# Patient Record
Sex: FEMALE | Race: WHITE | NOT HISPANIC OR LATINO | Employment: UNEMPLOYED | URBAN - METROPOLITAN AREA
[De-identification: names, ages, dates, MRNs, and addresses within clinical notes are randomized per-mention and may not be internally consistent; named-entity substitution may affect disease eponyms.]

---

## 2017-01-10 ENCOUNTER — TRANSCRIBE ORDERS (OUTPATIENT)
Dept: ADMINISTRATIVE | Age: 67
End: 2017-01-10

## 2017-01-10 ENCOUNTER — APPOINTMENT (OUTPATIENT)
Dept: LAB | Age: 67
End: 2017-01-10
Payer: MEDICARE

## 2017-01-10 ENCOUNTER — APPOINTMENT (OUTPATIENT)
Dept: PHYSICAL THERAPY | Age: 67
End: 2017-01-10
Payer: MEDICARE

## 2017-01-10 DIAGNOSIS — E78.2 MIXED HYPERLIPIDEMIA: ICD-10-CM

## 2017-01-10 DIAGNOSIS — Z12.11 ENCOUNTER FOR SCREENING FOR MALIGNANT NEOPLASM OF COLON: ICD-10-CM

## 2017-01-10 DIAGNOSIS — Z12.31 ENCOUNTER FOR SCREENING MAMMOGRAM FOR MALIGNANT NEOPLASM OF BREAST: ICD-10-CM

## 2017-01-10 DIAGNOSIS — K21.9 GASTRO-ESOPHAGEAL REFLUX DISEASE WITHOUT ESOPHAGITIS: ICD-10-CM

## 2017-01-10 LAB
ALBUMIN SERPL BCP-MCNC: 3.7 G/DL (ref 3.5–5)
ALP SERPL-CCNC: 63 U/L (ref 46–116)
ALT SERPL W P-5'-P-CCNC: 27 U/L (ref 12–78)
ANION GAP SERPL CALCULATED.3IONS-SCNC: 6 MMOL/L (ref 4–13)
AST SERPL W P-5'-P-CCNC: 12 U/L (ref 5–45)
BILIRUB SERPL-MCNC: 0.49 MG/DL (ref 0.2–1)
BUN SERPL-MCNC: 13 MG/DL (ref 5–25)
CALCIUM SERPL-MCNC: 8.9 MG/DL (ref 8.3–10.1)
CHLORIDE SERPL-SCNC: 106 MMOL/L (ref 100–108)
CHOLEST SERPL-MCNC: 225 MG/DL (ref 50–200)
CO2 SERPL-SCNC: 30 MMOL/L (ref 21–32)
CREAT SERPL-MCNC: 0.66 MG/DL (ref 0.6–1.3)
GFR SERPL CREATININE-BSD FRML MDRD: >60 ML/MIN/1.73SQ M
GLUCOSE SERPL-MCNC: 85 MG/DL (ref 65–140)
HDLC SERPL-MCNC: 83 MG/DL (ref 40–60)
LDLC SERPL CALC-MCNC: 126 MG/DL (ref 0–100)
POTASSIUM SERPL-SCNC: 3.9 MMOL/L (ref 3.5–5.3)
PROT SERPL-MCNC: 7.1 G/DL (ref 6.4–8.2)
SODIUM SERPL-SCNC: 142 MMOL/L (ref 136–145)
TRIGL SERPL-MCNC: 80 MG/DL

## 2017-01-10 PROCEDURE — G8992 OTHER PT/OT  D/C STATUS: HCPCS | Performed by: PHYSICAL THERAPIST

## 2017-01-10 PROCEDURE — 97110 THERAPEUTIC EXERCISES: CPT

## 2017-01-10 PROCEDURE — 36415 COLL VENOUS BLD VENIPUNCTURE: CPT

## 2017-01-10 PROCEDURE — G8991 OTHER PT/OT GOAL STATUS: HCPCS | Performed by: PHYSICAL THERAPIST

## 2017-01-10 PROCEDURE — 80061 LIPID PANEL: CPT

## 2017-01-10 PROCEDURE — 80053 COMPREHEN METABOLIC PANEL: CPT

## 2017-01-18 ENCOUNTER — ALLSCRIPTS OFFICE VISIT (OUTPATIENT)
Dept: OTHER | Facility: OTHER | Age: 67
End: 2017-01-18

## 2017-02-06 ENCOUNTER — GENERIC CONVERSION - ENCOUNTER (OUTPATIENT)
Dept: OTHER | Facility: OTHER | Age: 67
End: 2017-02-06

## 2017-03-31 DIAGNOSIS — Z12.31 ENCOUNTER FOR SCREENING MAMMOGRAM FOR MALIGNANT NEOPLASM OF BREAST: ICD-10-CM

## 2017-05-11 ENCOUNTER — HOSPITAL ENCOUNTER (OUTPATIENT)
Dept: RADIOLOGY | Age: 67
Discharge: HOME | End: 2017-05-11
Payer: MEDICARE

## 2017-05-11 DIAGNOSIS — Z12.31 ENCOUNTER FOR SCREENING MAMMOGRAM FOR MALIGNANT NEOPLASM OF BREAST: ICD-10-CM

## 2017-06-20 ENCOUNTER — HOSPITAL ENCOUNTER (OUTPATIENT)
Dept: RADIOLOGY | Age: 67
Discharge: HOME | End: 2017-06-20
Payer: MEDICARE

## 2017-06-20 DIAGNOSIS — Z12.31 ENCOUNTER FOR SCREENING MAMMOGRAM FOR MALIGNANT NEOPLASM OF BREAST: ICD-10-CM

## 2017-06-20 PROCEDURE — G0202 SCR MAMMO BI INCL CAD: HCPCS

## 2017-08-14 ENCOUNTER — ALLSCRIPTS OFFICE VISIT (OUTPATIENT)
Dept: OTHER | Facility: OTHER | Age: 67
End: 2017-08-14

## 2017-08-14 DIAGNOSIS — R53.81 OTHER MALAISE: ICD-10-CM

## 2017-08-14 DIAGNOSIS — E55.9 VITAMIN D DEFICIENCY: ICD-10-CM

## 2017-08-14 DIAGNOSIS — R53.83 OTHER FATIGUE: ICD-10-CM

## 2017-08-14 DIAGNOSIS — E53.8 DEFICIENCY OF OTHER SPECIFIED B GROUP VITAMINS: ICD-10-CM

## 2017-08-14 DIAGNOSIS — S99.929A INJURY OF FOOT: ICD-10-CM

## 2017-08-15 ENCOUNTER — TRANSCRIBE ORDERS (OUTPATIENT)
Dept: ADMINISTRATIVE | Age: 67
End: 2017-08-15

## 2017-08-15 ENCOUNTER — APPOINTMENT (OUTPATIENT)
Dept: LAB | Age: 67
End: 2017-08-15
Payer: MEDICARE

## 2017-08-15 DIAGNOSIS — R53.83 OTHER FATIGUE: ICD-10-CM

## 2017-08-15 DIAGNOSIS — R53.81 OTHER MALAISE: ICD-10-CM

## 2017-08-15 DIAGNOSIS — E55.9 VITAMIN D DEFICIENCY: ICD-10-CM

## 2017-08-15 DIAGNOSIS — E53.8 DEFICIENCY OF OTHER SPECIFIED B GROUP VITAMINS: ICD-10-CM

## 2017-08-15 LAB
25(OH)D3 SERPL-MCNC: 42.1 NG/ML (ref 30–100)
TSH SERPL DL<=0.05 MIU/L-ACNC: 2.64 UIU/ML (ref 0.36–3.74)
VIT B12 SERPL-MCNC: 886 PG/ML (ref 100–900)

## 2017-08-15 PROCEDURE — 82607 VITAMIN B-12: CPT

## 2017-08-15 PROCEDURE — 82306 VITAMIN D 25 HYDROXY: CPT

## 2017-08-15 PROCEDURE — 84443 ASSAY THYROID STIM HORMONE: CPT

## 2017-08-15 PROCEDURE — 36415 COLL VENOUS BLD VENIPUNCTURE: CPT

## 2017-08-18 ENCOUNTER — APPOINTMENT (OUTPATIENT)
Dept: RADIOLOGY | Age: 67
End: 2017-08-18
Attending: FAMILY MEDICINE
Payer: MEDICARE

## 2017-08-18 ENCOUNTER — OFFICE VISIT (OUTPATIENT)
Dept: URGENT CARE | Age: 67
End: 2017-08-18
Payer: MEDICARE

## 2017-08-18 DIAGNOSIS — S99.929A INJURY OF FOOT: ICD-10-CM

## 2017-08-18 PROCEDURE — 73630 X-RAY EXAM OF FOOT: CPT

## 2017-08-18 PROCEDURE — 99203 OFFICE O/P NEW LOW 30 MIN: CPT | Performed by: FAMILY MEDICINE

## 2017-08-18 PROCEDURE — G0463 HOSPITAL OUTPT CLINIC VISIT: HCPCS | Performed by: FAMILY MEDICINE

## 2017-09-28 ENCOUNTER — ALLSCRIPTS OFFICE VISIT (OUTPATIENT)
Dept: OTHER | Facility: OTHER | Age: 67
End: 2017-09-28

## 2017-10-17 RX ORDER — LEVALBUTEROL TARTRATE 45 UG/1
1 AEROSOL, METERED ORAL EVERY 4 HOURS PRN
COMMUNITY
End: 2018-08-22 | Stop reason: SDUPTHER

## 2017-10-17 RX ORDER — MONTELUKAST SODIUM 10 MG/1
10 TABLET ORAL DAILY
COMMUNITY
End: 2018-04-15 | Stop reason: SDUPTHER

## 2017-10-17 RX ORDER — FOLIC ACID 1 MG/1
1 TABLET ORAL
Status: ON HOLD | COMMUNITY
End: 2017-10-24 | Stop reason: ALTCHOICE

## 2017-10-17 RX ORDER — FLUTICASONE PROPIONATE 50 MCG
2 SPRAY, SUSPENSION (ML) NASAL AS NEEDED
COMMUNITY
End: 2019-09-05 | Stop reason: SDUPTHER

## 2017-10-24 ENCOUNTER — GENERIC CONVERSION - ENCOUNTER (OUTPATIENT)
Dept: GASTROENTEROLOGY | Facility: MEDICAL CENTER | Age: 67
End: 2017-10-24

## 2017-10-24 ENCOUNTER — HOSPITAL ENCOUNTER (OUTPATIENT)
Facility: MEDICAL CENTER | Age: 67
Setting detail: OUTPATIENT SURGERY
Discharge: HOME | End: 2017-10-24
Attending: INTERNAL MEDICINE | Admitting: INTERNAL MEDICINE
Payer: MEDICARE

## 2017-10-24 ENCOUNTER — ANESTHESIA EVENT (OUTPATIENT)
Dept: GASTROENTEROLOGY | Facility: MEDICAL CENTER | Age: 67
End: 2017-10-24
Payer: MEDICARE

## 2017-10-24 ENCOUNTER — ANESTHESIA (OUTPATIENT)
Dept: GASTROENTEROLOGY | Facility: MEDICAL CENTER | Age: 67
End: 2017-10-24
Payer: MEDICARE

## 2017-10-24 VITALS
OXYGEN SATURATION: 97 % | HEART RATE: 65 BPM | WEIGHT: 154 LBS | RESPIRATION RATE: 18 BRPM | TEMPERATURE: 97.7 F | DIASTOLIC BLOOD PRESSURE: 73 MMHG | HEIGHT: 64 IN | BODY MASS INDEX: 26.29 KG/M2 | SYSTOLIC BLOOD PRESSURE: 113 MMHG

## 2017-10-24 DIAGNOSIS — R13.14 DYSPHAGIA, PHARYNGOESOPHAGEAL PHASE: ICD-10-CM

## 2017-10-24 PROCEDURE — 88313 SPECIAL STAINS GROUP 2: CPT | Performed by: INTERNAL MEDICINE

## 2017-10-24 PROCEDURE — 88342 IMHCHEM/IMCYTCHM 1ST ANTB: CPT | Performed by: INTERNAL MEDICINE

## 2017-10-24 PROCEDURE — 88305 TISSUE EXAM BY PATHOLOGIST: CPT | Performed by: INTERNAL MEDICINE

## 2017-10-24 RX ORDER — SODIUM CHLORIDE 9 MG/ML
125 INJECTION, SOLUTION INTRAVENOUS CONTINUOUS
Status: DISCONTINUED | OUTPATIENT
Start: 2017-10-24 | End: 2017-10-24 | Stop reason: HOSPADM

## 2017-10-24 RX ORDER — ANTIARTHRITIC COMBINATION NO.2 900 MG
TABLET ORAL
COMMUNITY
End: 2021-02-15

## 2017-10-24 RX ORDER — LANOLIN ALCOHOL/MO/W.PET/CERES
CREAM (GRAM) TOPICAL DAILY
COMMUNITY

## 2017-10-24 RX ORDER — NIACIN 500 MG
500 TABLET ORAL
COMMUNITY
End: 2021-02-15

## 2017-10-24 RX ORDER — PROPOFOL 10 MG/ML
INJECTION, EMULSION INTRAVENOUS AS NEEDED
Status: DISCONTINUED | OUTPATIENT
Start: 2017-10-24 | End: 2017-10-24 | Stop reason: SURG

## 2017-10-24 RX ADMIN — PROPOFOL 20 MG: 10 INJECTION, EMULSION INTRAVENOUS at 10:39

## 2017-10-24 RX ADMIN — PROPOFOL 20 MG: 10 INJECTION, EMULSION INTRAVENOUS at 10:35

## 2017-10-24 RX ADMIN — PROPOFOL 80 MG: 10 INJECTION, EMULSION INTRAVENOUS at 10:34

## 2017-10-24 RX ADMIN — SODIUM CHLORIDE 125 ML/HR: 0.9 INJECTION, SOLUTION INTRAVENOUS at 09:55

## 2017-10-24 NOTE — ANESTHESIA PREPROCEDURE EVALUATION
Review of Systems/Medical History  Patient summary reviewed  Chart reviewed  No history of anesthetic complications     Cardiovascular  Negative cardio ROS    Pulmonary  Asthma: , ,        GI/Hepatic    GERD well controlled,   Comment: Dysphagia      Negative  ROS        Endo/Other  Negative endo/other ROS      GYN  Negative gynecology ROS          Hematology  Negative hematology ROS      Musculoskeletal  Negative musculoskeletal ROS        Neurology   Psychology   Negative psychology ROS            Physical Exam    Airway    Mallampati score: II  TM Distance: >3 FB  Neck ROM: full     Dental   No notable dental hx     Cardiovascular  Comment: Negative ROS, Rhythm: regular, Rate: normal, Cardiovascular exam normal    Pulmonary  Pulmonary exam normal Breath sounds clear to auscultation,     Other Findings        Anesthesia Plan  ASA Score- 2       Anesthesia Type- IV sedation with anesthesia with ASA Monitors  Additional Monitors:   Airway Plan:           Induction- intravenous  Informed Consent- Anesthetic plan and risks discussed with patient

## 2017-10-24 NOTE — OP NOTE
**** GI/ENDOSCOPY REPORT ****     PATIENT NAME: Latasha Verdin - VISIT ID:  Patient ID: [id number]   YOB: 1950     INTRODUCTION: Esophagogastroduodenoscopy - A 69 female patient presents   for an outpatient Esophagogastroduodenoscopy at [address] [place] [address]  INDICATIONS: Dysphagia  CONSENT: The benefits, risks, and alternatives to the procedure were   discussed and informed consent was obtained from the patient  PREPARATION:  EKG, pulse, pulse oximetry and blood pressure were monitored   throughout the procedure  MEDICATIONS: MAC anesthesia  PROCEDURE:  The endoscope was passed without difficulty through the mouth   under direct visualization and advanced to the 2nd portion of the   duodenum  The scope was withdrawn and the mucosa was carefully examined  FINDINGS:   Esophagus: The Z line was visualized at 38 cm from the entry   site  The esophagus appeared to be normal  No signs of skinner's   esophagus  A cold forceps biopsy was taken  Stomach: There was a few   areas of erosion in the antrum  A cold forceps biopsy was taken  Duodenum: The duodenum appeared to be normal      COMPLICATIONS: There were no complications  IMPRESSIONS: Z line visualized  Normal esophagus  Biopsy taken  Areas of   erosion found in the antrum  Biopsy taken  Normal duodenum  RECOMMENDATIONS: Follow-up on the results of the biopsy specimens  ESTIMATED BLOOD LOSS: Insignificant  PATHOLOGY SPECIMENS: Cold forceps random biopsy taken  Cold forceps biopsy   taken  Associated finding: Erosion  PROCEDURE CODES:     ICD-9 Codes: 787 2 DYSPHAGIA 535 4 Other specified gastritides     ICD-10 Codes: R13 10 Dysphagia, unspecified R19 8 Other specified symptoms   and signs involving the digestive system and abdomen     PERFORMED BY: JAYLENE Pan  on 10/24/2017  Version 1, electronically signed by JAYLENE Gee  on 10/24/2017 at   10:45

## 2017-10-24 NOTE — DISCHARGE INSTRUCTIONS
Upper Endoscopy   WHAT YOU NEED TO KNOW:   An upper endoscopy is also called an upper gastrointestinal (GI) endoscopy, or an esophagogastroduodenoscopy (EGD)  You may feel bloated, gassy, or have some abdominal discomfort after your procedure  Your throat may be sore for 24 to 36 hours  You may burp or pass gas from air that is still inside your body  DISCHARGE INSTRUCTIONS:   Call 911 for any of the following:   · You have sudden chest pain or trouble breathing  Seek care immediately if:   · You feel dizzy or faint  · You have trouble swallowing  · Your bowel movements are very dark or black  · Your abdomen is hard and firm and you have severe pain  · You vomit blood  Contact your healthcare provider if:   · You feel full or bloated and cannot burp or pass gas  · You have not had a bowel movement for 3 days after your procedure  · You have neck pain  · You have a fever or chills  · You have nausea or are vomiting  · You have a rash or hives  · You have questions or concerns about your endoscopy  Relieve a sore throat:  Suck on throat lozenges or crushed ice  Gargle with a small amount of warm salt water  Mix 1 teaspoon of salt and 1 cup of warm water to make salt water  Relieve gas and discomfort from bloating:  Lie on your right side with a heating pad on your abdomen  Take short walks to help pass gas  Eat small meals until bloating is relieved  Rest after your procedure: You have been given medicine to relax you  Do not  drive or make important decisions until the day after your procedure  Return to your normal activity as directed  You can usually return to work the day after your procedure  Follow up with your healthcare provider as directed:  Write down your questions so you remember to ask them during your visits     © 2017 [address] is for End User's use only and may not be sold, redistributed or otherwise used for commercial purposes  All illustrations and images included in CareNotes® are the copyrighted property of [employer]  or Massimo Krause  The above information is an  only  It is not intended as medical advice for individual conditions or treatments  Talk to your doctor, nurse or pharmacist before following any medical regimen to see if it is safe and effective for you  Gastritis   WHAT YOU NEED TO KNOW:   Gastritis is inflammation or irritation of the lining of your stomach  DISCHARGE INSTRUCTIONS:   Call 911 for any of the following:   · You develop chest pain or shortness of breath  Seek care immediately if:   · You vomit blood  · You have black or bloody bowel movements  · You have severe stomach or back pain  Contact your healthcare provider if:   · You have a fever  · You have new or worsening symptoms, even after treatment  · You have questions or concerns about your condition or care  Medicines:   · Medicines  may be given to help treat a bacterial infection or decrease stomach acid  · Take your medicine as directed  Contact your healthcare provider if you think your medicine is not helping or if you have side effects  Tell him or her if you are allergic to any medicine  Keep a list of the medicines, vitamins, and herbs you take  Include the amounts, and when and why you take them  Bring the list or the pill bottles to follow-up visits  Carry your medicine list with you in case of an emergency  Manage or prevent gastritis:   · Do not smoke  Nicotine and other chemicals in cigarettes and cigars can make your symptoms worse and cause lung damage  Ask your healthcare provider for information if you currently smoke and need help to quit  E-cigarettes or smokeless tobacco still contain nicotine  Talk to your healthcare provider before you use these products  · Do not drink alcohol  Alcohol can prevent healing and make your gastritis worse   Talk to your healthcare provider if you need help to stop drinking  · Do not take NSAIDs or aspirin unless directed  These and similar medicines can cause irritation  If your healthcare provider says it is okay to take NSAIDs, take them with food  · Do not eat foods that cause irritation  Foods such as oranges and salsa can cause burning or pain  Eat a variety of healthy foods  Examples include fruits (not citrus), vegetables, low-fat dairy products, beans, whole-grain breads, and lean meats and fish  Try to eat small meals, and drink water with your meals  Do not eat for at least 3 hours before you go to bed  · Find ways to relax and decrease stress  Stress can increase stomach acid and make gastritis worse  Activities such as yoga, meditation, or listening to music can help you relax  Spend time with friends, or do things you enjoy  Follow up with your healthcare provider as directed: You may need ongoing tests or treatment, or referral to a gastroenterologist  Write down your questions so you remember to ask them during your visits  © 2017 [address] [facility] Information is for End User's use only and may not be sold, redistributed or otherwise used for commercial purposes  All illustrations and images included in CareNotes® are the copyrighted property of [employer]  or Massimo Krause  The above information is an  only  It is not intended as medical advice for individual conditions or treatments  Talk to your doctor, nurse or pharmacist before following any medical regimen to see if it is safe and effective for you

## 2017-10-25 ENCOUNTER — TRANSCRIBE ORDERS (OUTPATIENT)
Dept: ADMINISTRATIVE | Facility: HOSPITAL | Age: 67
End: 2017-10-25

## 2017-10-25 DIAGNOSIS — R13.19 ESOPHAGEAL DYSPHAGIA: Primary | ICD-10-CM

## 2017-11-01 ENCOUNTER — GENERIC CONVERSION - ENCOUNTER (OUTPATIENT)
Dept: OTHER | Facility: OTHER | Age: 67
End: 2017-11-01

## 2017-12-19 ENCOUNTER — HOSPITAL ENCOUNTER (OUTPATIENT)
Dept: GASTROENTEROLOGY | Facility: HOSPITAL | Age: 67
Discharge: HOME | End: 2017-12-22
Attending: INTERNAL MEDICINE
Payer: MEDICARE

## 2017-12-19 VITALS
DIASTOLIC BLOOD PRESSURE: 77 MMHG | RESPIRATION RATE: 20 BRPM | HEART RATE: 76 BPM | SYSTOLIC BLOOD PRESSURE: 149 MMHG | WEIGHT: 156 LBS | TEMPERATURE: 98.1 F | HEIGHT: 64 IN | BODY MASS INDEX: 26.63 KG/M2 | OXYGEN SATURATION: 97 %

## 2017-12-19 PROCEDURE — 91020 GASTRIC MOTILITY STUDIES: CPT

## 2017-12-19 NOTE — PERIOPERATIVE NURSING NOTE
Manometry catheter passed through right side  Pt tolerated 20 liquid swallows  Catheter removed withour difficulty  Pt left in NAD

## 2018-01-11 NOTE — PROGRESS NOTES
Assessment    1  Benign essential hypertension (401 1) (I10)   · wean off BP med   2  Allergic rhinitis (477 9) (J30 9)   3  Gastroesophageal reflux disease without esophagitis (530 81) (K21 9)    Plan  Allergic rhinitis, Asthma    · Montelukast Sodium 10 MG Oral Tablet; TAKE 1 TABLET BY MOUTH DAILY  Allergic rhinitis, Benign essential hypertension, Gastroesophageal reflux disease  without esophagitis    · Continue with our present treatment plan ; Status:Complete;   Done: 77INM2461  Atypical chest pain, Gastroesophageal reflux disease without esophagitis    · Ranitidine HCl - 150 MG Oral Tablet; TAKE 1 TABLET TWICE DAILY    Chief Complaint  Pt is here for a recheck of her BP  No problems or complaints  She retired from her job 4 days ago  History of Present Illness  Just retired! The patient presents for follow-up of essential hypertension  The patient states she has been doing well with her blood pressure control since the last visit  Symptoms:   Home monitoring: The patient checks her blood pressure sporadically  Medications: The patient is not currently on any medications for her hypertension--lifestyle modification only  The patient is being seen for a routine clinic follow-up of rhinitis  Symptoms include:  nasal itching    The patient presents with complaints of nasal drainage  Symptoms are improving  Current treatment includes leukotriene modifiers  The patient is being seen for a routine clinic follow-up of gastroesophageal reflux disease  The patient presents with complaints of gradual onset of heartburn  She is currently experiencing heartburn  The patient is currently experiencing symptoms  Review of Systems    Constitutional: No fever, no chills, feels well, no tiredness, no recent weight gain or weight loss  Eyes: No complaints of eye pain, no red eyes, no eyesight problems, no discharge, no dry eyes, no itching of eyes     ENT: no complaints of earache, no loss of hearing, no nose bleeds, no nasal discharge, no sore throat, no hoarseness  Cardiovascular: No complaints of slow heart rate, no fast heart rate, no chest pain, no palpitations, no leg claudication, no lower extremity edema  Respiratory: No complaints of shortness of breath, no wheezing, no cough, no SOB on exertion, no orthopnea, no PND  Gastrointestinal: No complaints of abdominal pain, no constipation, no nausea or vomiting, no diarrhea, no bloody stools  Genitourinary: No complaints of dysuria, no incontinence, no pelvic pain, no dysmenorrhea, no vaginal discharge or bleeding  Musculoskeletal: No complaints of arthralgias, no myalgias, no joint swelling or stiffness, no limb pain or swelling  Integumentary: No complaints of skin rash or lesions, no itching, no skin wounds, no breast pain or lump  Neurological: No complaints of headache, no confusion, no convulsions, no numbness, no dizziness or fainting, no tingling, no limb weakness, no difficulty walking  Psychiatric: Not suicidal, no sleep disturbance, no anxiety or depression, no change in personality, no emotional problems  Endocrine: No complaints of proptosis, no hot flashes, no muscle weakness, no deepening of the voice, no feelings of weakness  Hematologic/Lymphatic: No complaints of swollen glands, no swollen glands in the neck, does not bleed easily, does not bruise easily  ROS reviewed  Active Problems    1  Abnormal mammogram (793 80) (R92 8)   2  Allergic rhinitis (477 9) (J30 9)   3  Arthritis (716 90) (M19 90)   4  Asthma (493 90) (J45 909)   5  Atypical chest pain (786 59) (R07 89)   6  Benign essential hypertension (401 1) (I10)   7  Encounter for screening colonoscopy (V76 51) (Z12 11)   8  Gastroesophageal reflux disease without esophagitis (530 81) (K21 9)   9  Low back pain (724 2) (M54 5)   10  Menopause (627 2)   11  Mixed hyperlipidemia (272 2) (E78 2)   12  Osteoarthritis (715 90) (M19 90)   13   Osteopenia (733 90) (M85 80)   14  Perimenopausal vasomotor symptoms (627 2) (N95 1)   15  Vitamin D deficiency (268 9) (E55 9)    Past Medical History    1  History of Abnormal alkaline phosphatase test (790 5) (R74 8)   2  History of Abnormal breast finding (796 4) (R68 89)   3  History of Abnormal electrocardiogram (794 31) (R94 31)   4  History of DEXA Body Composition Study   5  History of Encounter for routine gynecological examination (V72 31) (Z01 419)   6  History of Encounter for screening mammogram for malignant neoplasm of breast   (V76 12) (Z12 31)   7  History of allergic reaction (V15 09) (Z88 9)   8  History of shortness of breath (V13 89) (Z87 898)   9  History of Palpitations (785 1) (R00 2)   10  History of Pelvic pressure in female (625 8) (N94 89)   11  History of Screening for thyroid disorder (V77 0) (Z13 29)   12  History of Screening, lipid (V77 91) (Z13 220)    The active problems and past medical history were reviewed and updated today  Surgical History    1  History of Cataract Surgery   2  History of Colonoscopy (Fiberoptic) Screening   3  History of Endometrial Biopsy By Suction   4  History of Hysterectomy   5  History of Shoulder Surgery   6  History of Tonsillectomy   7  History of Total Abdominal Hysterectomy    The surgical history was reviewed and updated today  Family History    1  Family history of diabetes mellitus (V18 0) (Z83 3)   2  Family history of hypertension (V17 49) (Z82 49)   3  Family history of skin cancer (V16 8) (Z80 8)    4  Family history of cardiac disorder (V17 49) (Z82 49)   5  Family history of cerebrovascular accident (V17 1) (Z82 3)   6  Family history of diabetes mellitus (V18 0) (Z83 3)   7  Family history of hypertension (V17 49) (Z82 49)   8  Family history of malignant neoplasm (V16 9) (Z80 9)    The family history was reviewed and updated today         Social History    · Being A Social Drinker   · Engaged to be    · Never A Smoker   · Never Drank Alcohol   · Never Used Drugs  The social history was reviewed and updated today  Current Meds   1  Fluticasone Propionate 50 MCG/ACT Nasal Suspension; USE 1 TO 2 SPRAYS IN EACH   NOSTRIL ONCE DAILY; Therapy: 74MEN9438 to (Last [facility])  Requested for: 54Yhd7127 Ordered   2  Folic Acid 1 MG Oral Tablet; Therapy: 77CUZ3291 to Recorded   3  Montelukast Sodium 10 MG Oral Tablet; TAKE 1 TABLET BY MOUTH DAILY; Therapy: 87NSO5910 to (Tyrel Tenorio)  Requested for: 78UQI4549; Last   Rx:34Ygj8427 Ordered   4  Niaspan TBCR; Therapy: (Recorded:68Ryz9845) to Recorded   5  Premarin 0 45 MG Oral Tablet; take one tablet by mouth every day; Therapy: 33ZKQ1841 to (Last Rx:53Yef6941)  Requested for: 79Ggv3636 Ordered   6  Ranitidine HCl - 150 MG Oral Tablet; TAKE 1 TABLET TWICE DAILY; Therapy: 06ZZE5260 to (Evaluate:67Tsw0956)  Requested for: 42VBK8483; Last   Rx:73Uaz2547 Ordered   7  Super B Complex Maxi Oral Tablet; Therapy: 51OLS1561 to Recorded   8  Vitamin C Oral Tablet Chewable; Therapy: (Recorded:66Iow7678) to Recorded   9  Vitamin D-3 5000 UNIT TABS; Therapy: 21KGE0817 to Recorded   10  Xopenex HFA 45 MCG/ACT Inhalation Aerosol; INHALE 1 PUFF EVERY 4 HOURS AS    NEEDED; Therapy: 05TIN5230 to (Evaluate:64Onw7837)  Requested for: 28QFF8675; Last    Rx:44Not4418 Ordered    Allergies    1  Elavil TABS    Vitals  Vital Signs [Data Includes: Current Encounter]    Recorded: [id number] 08:42AM Recorded: 03MDL8950 89:93LQ   Systolic 026    Diastolic 80    BP Comments not audiblle on left    Height  5 ft 3 in   Weight  162 lb    BMI Calculated  28 7   BSA Calculated  1 77     Physical Exam    Constitutional   General appearance: No acute distress, well appearing and well nourished  Ears, Nose, Mouth, and Throat   Oropharynx: Normal with no erythema, edema, exudate or lesions  Pulmonary   Auscultation of lungs: Clear to auscultation      Cardiovascular   Auscultation of heart: Normal rate and rhythm, normal S1 and S2, without murmurs  Musculoskeletal   Gait and station: Normal     Psychiatric   Orientation to person, place, and time: Normal     Mood and affect: Normal          Health Management  Health Maintenance   BREAST EXAM; every 1 year; Last 49LHZ0827; Next Due: 35LGA0043; Overdue  COLONOSCOPY; every 10 years; Last 15ZHZ2702; Next Due: 47EJH4084; Active  Dexa Scan Peripheral (extremity); every 2 years; Next Due: 05Emw0287; Overdue  Medicare Annual Wellness Visit; every 1 year; Next Due: 62Mxg4013; Overdue  PELVIC EXAM; every 1 year; Next Due: 53Tbd2505; Overdue    Future Appointments    Date/Time Provider Specialty Site   03/09/2016 08:00 AM JAYLENE Paredes   [facility]     Signatures   Electronically signed by : Kathie Vasques DO; Feb 2 2747  9:06AM EST                       (Author)

## 2018-01-14 VITALS
DIASTOLIC BLOOD PRESSURE: 76 MMHG | HEART RATE: 79 BPM | TEMPERATURE: 98.1 F | BODY MASS INDEX: 28.04 KG/M2 | SYSTOLIC BLOOD PRESSURE: 118 MMHG | HEIGHT: 63 IN | OXYGEN SATURATION: 97 % | WEIGHT: 158.25 LBS

## 2018-01-14 VITALS
HEIGHT: 63 IN | WEIGHT: 156.38 LBS | DIASTOLIC BLOOD PRESSURE: 78 MMHG | BODY MASS INDEX: 27.71 KG/M2 | SYSTOLIC BLOOD PRESSURE: 134 MMHG

## 2018-01-15 VITALS
HEIGHT: 63 IN | SYSTOLIC BLOOD PRESSURE: 128 MMHG | DIASTOLIC BLOOD PRESSURE: 78 MMHG | BODY MASS INDEX: 28.02 KG/M2 | WEIGHT: 158.13 LBS

## 2018-02-13 NOTE — RESULT NOTES
Discussion/Summary   stomach biopsy showed gastritis  Verified Results  (1) TISSUE EXAM [id number] 10:36AM Mirela Garcia     Test Name Result Flag Reference   LAB AP CASE REPORT (Report)     Surgical Pathology Report             Case: [id number]                   Authorizing Provider: Harpreet Gifford MD       Collected:      10/24/2017 1036        Ordering Location:   [address] [place] [address] End    Received:      10/25/2017 [facility]                            Pathologist:      Fritz Garcia MD                                 Specimens:  A) - Stomach, bx of gastric erosions                                  B) - Esophagus, nl appearing mucosa  r/o eoe   LAB AP FINAL DIAGNOSIS (Report)     A  Stomach (biopsy):  - Chronic inactive oxyntic gastritis  - No H pylori identified on immunohistochemistry stain  - No intestinal metaplasia (Alcian blue/PAS)    B  Esophagus (biopsy):  - Extremely scant benign superficial squamous mucosa  - No intestinal metaplasia       Electronically signed by Fritz Garcia MD on 10/28/2017 at 1:05 PM   LAB AP SURGICAL ADDITIONAL INFORMATION (Report)     All controls performed with the immunohistochemical stains reported above   reacted appropriately  These tests were developed and their performance   characteristics determined by [facility] or   [facility]  They may not be cleared or approved by the [place]  Food and Drug Administration  The FDA has determined that such clearance   or approval is not necessary  These tests are used for clinical purposes  They should not be regarded as investigational or for research  This   laboratory has been approved by CLIA 88, designated as a high-complexity   laboratory and is qualified to perform these tests  LAB AP GROSS DESCRIPTION (Report)     A  The specimen is received in formalin, labeled with the patient's name   and medical record number, and is designated Stomach biopsy   The   specimen consists of 2 tan-pink soft tissue fragments each measuring 0 3   cm in greatest dimension  Entirely submitted  One cassette  B  The specimen is received in formalin, labeled with the patient's name   and medical record number, and is designated Esophagus biopsy  The   specimen consists of 2 white tan-pink soft tissue fragments each measuring   0 3 cm in greatest dimension  Entirely submitted  One cassette  Note: The estimated total formalin fixation time based upon information   provided by the submitting clinician and the standard processing schedule   is under 72 hours  MAS   LAB AP CLINICAL INFORMATION (Report)     EGD  FINDINGS:  Esophagus: The Z line was visualized at 38 cm from the entry   site  The esophagus appeared to be normal  No signs of skinner's   esophagus  A cold forceps biopsy was taken  Stomach: There was a few   areas of erosion in the antrum  A cold forceps biopsy was taken     Duodenum: The duodenum appeared to be normal

## 2018-02-16 RX ORDER — FOLIC ACID 1 MG/1
TABLET ORAL
COMMUNITY
Start: 2014-09-22 | End: 2021-02-15

## 2018-02-21 ENCOUNTER — OFFICE VISIT (OUTPATIENT)
Dept: FAMILY MEDICINE CLINIC | Facility: CLINIC | Age: 68
End: 2018-02-21
Payer: MEDICARE

## 2018-02-21 VITALS
SYSTOLIC BLOOD PRESSURE: 112 MMHG | HEIGHT: 63 IN | BODY MASS INDEX: 28.67 KG/M2 | WEIGHT: 161.8 LBS | DIASTOLIC BLOOD PRESSURE: 64 MMHG

## 2018-02-21 DIAGNOSIS — E78.2 MIXED HYPERLIPIDEMIA: Primary | ICD-10-CM

## 2018-02-21 DIAGNOSIS — Z23 NEED FOR PNEUMOCOCCAL VACCINATION: ICD-10-CM

## 2018-02-21 PROBLEM — E53.8 VITAMIN B12 DEFICIENCY: Status: ACTIVE | Noted: 2017-08-14

## 2018-02-21 PROCEDURE — 90670 PCV13 VACCINE IM: CPT

## 2018-02-21 PROCEDURE — 99214 OFFICE O/P EST MOD 30 MIN: CPT | Performed by: FAMILY MEDICINE

## 2018-02-21 PROCEDURE — G0009 ADMIN PNEUMOCOCCAL VACCINE: HCPCS

## 2018-02-21 NOTE — PROGRESS NOTES
Assessment/Plan:  Patient Instructions   Hyperlipidemia   AMBULATORY CARE:   Hyperlipidemia  is a high level of lipids (fats) in your blood  These lipids include cholesterol or triglycerides  Lipids are made by your body  They also come from the foods you eat  Your body needs lipids to work properly, but high levels increase your risk for heart disease, heart attack, and stroke  Call 911 for any of the following:   · You have any of the following signs of a heart attack:      ¨ Squeezing, pressure, or pain in your chest that lasts longer than 5 minutes or returns    ¨ Discomfort or pain in your back, neck, jaw, stomach, or arm     ¨ Trouble breathing    ¨ Nausea or vomiting    ¨ Lightheadedness or a sudden cold sweat, especially with chest pain or trouble breathing    · You have any of the following signs of a stroke:      ¨ Numbness or drooping on one side of your face     ¨ Weakness in an arm or leg    ¨ Confusion or difficulty speaking    ¨ Dizziness, a severe headache, or vision loss  Contact your healthcare provider if:   · You have questions or concerns about your condition or care  Treatment of hyperlipidemia  may first include lifestyle changes to help decrease your lipid levels  You may also need to take medicine to lower your lipid levels  Some of the lifestyle changes you may need to make include the following:  · Maintain a healthy weight  Ask your healthcare provider how much you should weigh  Ask him or her to help you create a weight loss plan if you are overweight  Weight loss can decrease your cholesterol and triglyceride levels  · Exercise as directed  Exercise lowers your cholesterol levels and helps you maintain a healthy weight  Get 40 minutes or more of moderate exercise 3 to 4 days each week  You can split your exercise into four 10-minute workouts instead of 40 minutes at one time  Examples of moderate exercises include walking briskly, swimming, or riding a bike   Work with your healthcare provider to plan the best exercise program for you  · Do not smoke  Nicotine and other chemicals in cigarettes and cigars can increase your risk for a heart attack and stroke  Ask your healthcare provider for information if you currently smoke and need help to quit  E-cigarettes or smokeless tobacco still contain nicotine  Talk to your healthcare provider before you use these products  · Eat heart-healthy foods  Talk to your dietitian about a heart-healthy diet  The following will help you manage hyperlipidemia:     ¨ Decrease the total amount of fat you eat  Choose lean meats, fat-free or 1% fat milk, and low-fat dairy products, such as yogurt and cheese  Limit or do not eat red meat  Red meats are high in fat and cholesterol  ¨ Replace unhealthy fats with healthy fats  Unhealthy fats include saturated fat, trans fat, and cholesterol  Choose soft margarines that are low in saturated fat and have little or no trans fat  Monounsaturated fats are healthy fats  These are found in olive oil, canola oil, avocado, and nuts  Polyunsaturated fats are also healthy  These are found in fish, flaxseed, walnuts, and soybeans  ¨ Eat fruits and vegetables every day  They are low in calories and fat and a good source of essential vitamins  Include dark green, red, and orange vegetables  Examples include spinach, kale, broccoli, and carrots  ¨ Eat foods high in fiber  Choose whole grain, high-fiber foods  Good choices include whole-wheat breads or cereals, beans, peas, fruits, and vegetables  · Ask your healthcare provider if it is safe for you to drink alcohol  Alcohol can increase your cholesterol and triglyceride levels  A drink of alcohol is 12 ounces of beer, 5 ounces of wine, or 1½ ounces of liquor  Follow up with your healthcare provider as directed: You may need to return for more tests  Your healthcare provider may refer you to a dietitian   Write down your questions so you remember to ask them during your visits  © 2017 [employer] Information is for End User's use only and may not be sold, redistributed or otherwise used for commercial purposes  All illustrations and images included in CareNotes® are the copyrighted property of [employer]  or Massimo Krause  The above information is an  only  It is not intended as medical advice for individual conditions or treatments  Talk to your doctor, nurse or pharmacist before following any medical regimen to see if it is safe and effective for you  Discussed at length enhancing TLC and exercise  Will check on lipid profile on decide course of action pending numbers  The chest pain she is describing is definitely atypical   I do not feel that it is cardiac in nature  Did discuss that a stress echocardiogram certainly could be done to assess heart health  She declines at this point and feels that it is not cardiac in nature  Prevnar given today      Mixed hyperlipidemia  Update labs due       Diagnoses and all orders for this visit:    Mixed hyperlipidemia  -     Lipid Panel with Direct LDL reflex; Future  -     Comprehensive metabolic panel; Future    Need for pneumococcal vaccination  -     PNEUMOCOCCAL CONJUGATE VACCINE 13-VALENT GREATER THAN 6 MONTHS    Other orders  -     folic acid (FOLVITE) 1 mg tablet; Take by mouth  -     B Complex-Folic Acid (SUPER B COMPLEX MAXI) TABS; Take by mouth          Subjective:   Chief Complaint   Patient presents with    Follow-up    Hyperlipidemia    Pain     in the center of the chest and R shoulder  Feels it is from physical labor of shoveling the snow  Patient ID: Jay Moss is a 69 y o  female  Subjective:    Jessica Ricci is here for follow up of dyslipidemia  Compliance with treatment has been fair  The patient exercises intermittently  Patient denies muscle pain associated with her medications      The following portions of the patient's history were reviewed and updated as appropriate: allergies, current medications, past family history, past medical history, past social history, past surgical history and problem list     Review of Systems  Constitutional: negative  Ears, nose, mouth, throat, and face: negative  Respiratory negative  Cardiovascular: positive for Atypical non exertional chest discomfort  Genitourinary:negative  Musculoskeletal:negative     Objective:    /64   Ht 5' 2 5" (1 588 m)   Wt 73 4 kg (161 lb 12 8 oz)   BMI 29 12 kg/m²     General Appearance:    Alert, cooperative, no distress, appears stated age  Head:    Normocephalic, without obvious abnormality, atraumatic  Eyes:    PERRL, conjunctiva/corneas clear, EOM's intact, fundi     benign, both eyes  Ears:    Normal TM's and external ear canals, both ears  Nose:   Nares normal, septum midline, mucosa normal, no drainage    or sinus tenderness  Throat:   Lips, mucosa, and tongue normal; teeth and gums normal  Neck:   Supple, symmetrical, trachea midline, no adenopathy;     thyroid:  no enlargement/tenderness/nodules; no carotid    bruit or JVD  Back:     Symmetric, no curvature, ROM normal, no CVA tenderness  Lungs:     Clear to auscultation bilaterally, respirations unlabored  Chest Wall:    No tenderness or deformity   Heart:    Regular rate and rhythm, S1 and S2 normal, no murmur, rub   or gallop    Abdomen:     Soft, non-tender, bowel sounds active all four quadrants,     no masses, no organomegaly    Extremities:   Extremities normal, atraumatic, no cyanosis or edema  Pulses:   2+ and symmetric all extremities  Skin:   Skin color, texture, turgor normal, no rashes or lesions  Lymph nodes:   Cervical, supraclavicular, and axillary nodes normal  Neurologic:   CNII-XII intact, normal strength, sensation and reflexes     throughout    Lab Review  Lab Results       Component                Value               Date                       CHOL 225 (H)             01/10/2017                 CHOL                     222 (H)             01/19/2016                 CHOL                     221                 08/11/2015                 CHOL                     221                 01/29/2015                 CHOL                     218                 08/12/2014                 CHOL                     216                 08/12/2014                 TRIG                     80                  01/10/2017                 TRIG                     101                 01/19/2016                 TRIG                     151                 08/11/2015                 TRIG                     79                  01/29/2015                 TRIG                     90                  08/12/2014                 TRIG                     90                  08/12/2014                 HDL                      83 (H)              01/10/2017                 HDL                      70 (H)              01/19/2016                 HDL                      76                  08/11/2015                 HDL                      88                  01/29/2015                 HDL                      80                  08/12/2014                 HDL                      78                  08/12/2014              Patient is due for labs  Assessment:    Dyslipidemia under fair control  Plan:    1  Continue dietary measures  2  Continue regular exercise  3  Lipid-lowering medications: None at present  Will consider if LDL >130 on recheck           Chest Pain    This is a recurrent problem  The problem occurs intermittently  The problem has been waxing and waning  The pain is present in the lateral region  The pain is at a severity of 5/10  The pain is mild  The quality of the pain is described as dull  The pain does not radiate   Pertinent negatives include no abdominal pain, back pain, claudication, cough, diaphoresis, dizziness, exertional chest pressure, shortness of breath or syncope  Associated symptoms comments:   Absolutely does not happen with exercise like going upstairs walking    The pain is aggravated by nothing (  She thinks it may be her shoulder she has a history of rotator cuff )  Review of Systems   Constitutional: Negative for diaphoresis  Respiratory: Negative for cough and shortness of breath  Cardiovascular: Positive for chest pain  Negative for claudication and syncope  Gastrointestinal: Negative for abdominal pain  Musculoskeletal: Negative for back pain  Neurological: Negative for dizziness  Objective:  /64   Ht 5' 2 5" (1 588 m)   Wt 73 4 kg (161 lb 12 8 oz)   BMI 29 12 kg/m²        Physical Exam    See above

## 2018-02-22 NOTE — PATIENT INSTRUCTIONS

## 2018-03-01 ENCOUNTER — APPOINTMENT (OUTPATIENT)
Dept: LAB | Age: 68
End: 2018-03-01
Payer: MEDICARE

## 2018-03-01 DIAGNOSIS — E78.2 MIXED HYPERLIPIDEMIA: ICD-10-CM

## 2018-03-01 LAB
ALBUMIN SERPL BCP-MCNC: 3.7 G/DL (ref 3.5–5)
ALP SERPL-CCNC: 71 U/L (ref 46–116)
ALT SERPL W P-5'-P-CCNC: 23 U/L (ref 12–78)
ANION GAP SERPL CALCULATED.3IONS-SCNC: 5 MMOL/L (ref 4–13)
AST SERPL W P-5'-P-CCNC: 15 U/L (ref 5–45)
BILIRUB SERPL-MCNC: 0.68 MG/DL (ref 0.2–1)
BUN SERPL-MCNC: 14 MG/DL (ref 5–25)
CALCIUM SERPL-MCNC: 9 MG/DL (ref 8.3–10.1)
CHLORIDE SERPL-SCNC: 104 MMOL/L (ref 100–108)
CHOLEST SERPL-MCNC: 242 MG/DL (ref 50–200)
CO2 SERPL-SCNC: 31 MMOL/L (ref 21–32)
CREAT SERPL-MCNC: 0.7 MG/DL (ref 0.6–1.3)
GFR SERPL CREATININE-BSD FRML MDRD: 89 ML/MIN/1.73SQ M
GLUCOSE P FAST SERPL-MCNC: 91 MG/DL (ref 65–99)
HDLC SERPL-MCNC: 76 MG/DL (ref 40–60)
LDLC SERPL CALC-MCNC: 141 MG/DL (ref 0–100)
POTASSIUM SERPL-SCNC: 4 MMOL/L (ref 3.5–5.3)
PROT SERPL-MCNC: 7.3 G/DL (ref 6.4–8.2)
SODIUM SERPL-SCNC: 140 MMOL/L (ref 136–145)
TRIGL SERPL-MCNC: 124 MG/DL

## 2018-03-01 PROCEDURE — 80053 COMPREHEN METABOLIC PANEL: CPT

## 2018-03-01 PROCEDURE — 36415 COLL VENOUS BLD VENIPUNCTURE: CPT

## 2018-03-01 PROCEDURE — 80061 LIPID PANEL: CPT

## 2018-03-12 ENCOUNTER — OFFICE VISIT (OUTPATIENT)
Dept: GASTROENTEROLOGY | Facility: MEDICAL CENTER | Age: 68
End: 2018-03-12
Payer: MEDICARE

## 2018-03-12 VITALS
HEART RATE: 76 BPM | SYSTOLIC BLOOD PRESSURE: 122 MMHG | DIASTOLIC BLOOD PRESSURE: 82 MMHG | BODY MASS INDEX: 28.88 KG/M2 | WEIGHT: 163 LBS | HEIGHT: 63 IN | TEMPERATURE: 97.4 F

## 2018-03-12 DIAGNOSIS — K21.9 GASTROESOPHAGEAL REFLUX DISEASE WITHOUT ESOPHAGITIS: Primary | ICD-10-CM

## 2018-03-12 DIAGNOSIS — R13.10 DYSPHAGIA, UNSPECIFIED TYPE: ICD-10-CM

## 2018-03-12 PROCEDURE — 99214 OFFICE O/P EST MOD 30 MIN: CPT | Performed by: PHYSICIAN ASSISTANT

## 2018-03-12 NOTE — PATIENT INSTRUCTIONS
Gastroesophageal Reflux Disease   AMBULATORY CARE:   Gastroesophageal reflux  reflux occurs when acid and food in the stomach back up into the esophagus  Gastroesophageal reflux disease (GERD) is reflux that occurs more than twice a week for a few weeks  It usually causes heartburn and other symptoms  GERD can cause other health problems over time if it is not treated  Common symptoms include:  Heartburn is the most common symptom of GERD  You may feel burning pain in your chest or below the breast bone  This usually occurs after meals and spreads to your neck, jaw, or shoulder  The pain gets better when you change positions  You may also have any of the following:  · Bitter or acid taste in your mouth    · Dry cough    · Trouble swallowing or pain with swallowing    · Hoarseness or sore throat    · Frequent burping or hiccups    · Feeling of fullness soon after you start eating  Seek care immediately if:  · You feel full and cannot burp or vomit  · You have severe chest pain and sudden trouble breathing  · Your bowel movements are black, bloody, or tarry-looking  · Your vomit looks like coffee grounds or has blood in it  Contact your healthcare provider if:   · You vomit large amounts, or you vomit often  · You have trouble breathing after you vomit  · You have trouble swallowing, or pain with swallowing  · You are losing weight without trying  · Your symptoms get worse or do not improve with treatment  · You have questions or concerns about your condition or care  Treatment for GERD:  Your healthcare provider may prescribe medicine to decrease stomach acid  He may also prescribe medicine that help your esophagus and stomach move food and liquid to your intestines  Surgery may be done if other treatments do not work  You may need surgery to wrap the upper part of the stomach around the esophageal sphincter  This will strengthen the sphincter and prevent reflux     Manage GERD: · Do not have foods or drinks that may increase heartburn  These include chocolate, peppermint, fried or fatty foods, drinks that contain caffeine, or carbonated drinks (soda)  Other foods include spicy foods, onions, tomatoes, and tomato-based foods  Do not have foods or drinks that can irritate your esophagus, such as citrus fruits, juices, and alcohol  · Do not eat large meals  When you eat a lot of food at one time, your stomach needs more acid to digest it  Eat 6 small meals each day instead of 3 large ones, and eat slowly  Do not eat meals 2 to 3 hours before bedtime  · Elevate the head of your bed  Place 6-inch blocks under the head of your bed frame  You may also use more than one pillow under your head and shoulders while you sleep  · Maintain a healthy weight  If you are overweight, weight loss may help relieve symptoms of GERD  · Do not smoke  Smoking weakens the lower esophageal sphincter and increases the risk of GERD  Ask your healthcare provider for information if you currently smoke and need help to quit  E-cigarettes or smokeless tobacco still contain nicotine  Talk to your healthcare provider before you use these products  · Do not wear clothing that is tight around your waist   Tight clothing can put pressure on your stomach and cause or worsen GERD symptoms  Follow up with your healthcare provider as directed:  Write down your questions so you remember to ask them during your visits  © 2017 [employer] Information is for End User's use only and may not be sold, redistributed or otherwise used for commercial purposes  All illustrations and images included in CareNotes® are the copyrighted property of [employer]  or [address]  The above information is an  only  It is not intended as medical advice for individual conditions or treatments   Talk to your doctor, nurse or pharmacist before following any medical regimen to see if it is safe and effective for you

## 2018-03-12 NOTE — LETTER
March 12, 5817     Ashley Lara   [address] [facility]  [address]     Patient: Juma Kiser   YOB: 1950   Date of Visit: 3/12/2018       Dear Dr Heriberto Joyce: Thank you for referring Juma Kiser to me for evaluation  Below are my notes for this consultation  If you have questions, please do not hesitate to call me  I look forward to following your patient along with you  Sincerely,        Brennan Moise PA-C        CC: No Recipients  Robinson Arango  3/12/2018 10:07 AM  Sign at close encounter  Assessment/Plan:     Diagnoses and all orders for this visit:    Gastroesophageal reflux disease without esophagitis  She continues with intermittent reflux symptoms  We did discuss the GERD handout and dietary modifications-she does not wish to take a PPI at this time  He also recommended continuing with Zantac on an as-needed basis  Hopefully with dietary modification her symptoms will continue to improve  Dysphagia, unspecified type  I feel her dysphagia is probably likely related to her reflux as her EGD and manometry were within normal limits minus some gastric erosions  Again with dietary modifications hopefully her symptoms will improve  We will see her back on an as needed basis and she will call if she has any further problems  Subjective:      Patient ID: Juma Kiser is a 69 y o  female  HPI     This is a follow-up for GERD, dysphagia and to discuss her upper endoscopy and manometry studies  She states she continues with intermittent symptoms  She describes a sensation of food coming up her esophagus and occasionally feels like it gets stuck there  She states he may have a few times a week depending on what she is eating  She is not taking a PPI as she does not wish to take medicines on a regular basis  She does take Zantac as needed which does help her symptoms  She denies any abdominal pain or bowel problems   Her EGD was performed in October 2017 which showed a normal esophagus, erosions in the antrum, normal duodenum  Biopsies showed gastritis but was negative for H pylori  She then had a manometry study in Dec which was also within normal limits  Her last colonoscopy was with Dr Misa Hoffman approximately 2 years ago  Patient Active Problem List   Diagnosis    Allergic rhinitis    Arthritis    Asthma    Gastroesophageal reflux disease without esophagitis    Mixed hyperlipidemia    Osteoarthritis    Osteopenia    Rotator cuff syndrome of right shoulder    Vitamin B12 deficiency    Vitamin D deficiency     Allergies   Allergen Reactions    Amitriptyline      Current Outpatient Prescriptions on File Prior to Visit   Medication Sig    B Complex-Folic Acid (SUPER B COMPLEX MAXI) TABS Take by mouth    Biotin 5000 MCG TABS Take by mouth    Cholecalciferol (VITAMIN D3) 5000 units TABS Take by mouth    cyanocobalamin (VITAMIN B-12) 1,000 mcg tablet Take by mouth daily    fluticasone (FLONASE) 50 mcg/act nasal spray 2 sprays into each nostril as needed      folic acid (FOLVITE) 1 mg tablet Take by mouth    levalbuterol (XOPENEX HFA) 45 mcg/act inhaler Inhale 1 puff every 4 (four) hours as needed for wheezing    montelukast (SINGULAIR) 10 mg tablet Take 10 mg by mouth daily    niacin 500 mg tablet Take 500 mg by mouth daily with breakfast    Probiotic Product (PROBIOTIC-10 PO) Take by mouth     No current facility-administered medications on file prior to visit        Family History   Problem Relation Age of Onset    Diabetes Mother     Hypertension Mother     Skin cancer Mother     Heart disease Family     Other Family      CVA    Cancer Family      Past Medical History:   Diagnosis Date    Abnormal electrocardiogram     Prolonged QT    Asthma     GERD (gastroesophageal reflux disease)     Shortness of breath     Last assessed 03/12/14    Swallowing difficulty      Social History     Social History    Marital status:      Spouse name: N/A    Number of children: N/A    Years of education: N/A     Social History Main Topics    Smoking status: Never Smoker    Smokeless tobacco: Never Used    Alcohol use Yes      Comment: occasionally    Drug use: No    Sexual activity: Not Asked     Other Topics Concern    None     Social History Narrative    None     Past Surgical History:   Procedure Laterality Date    CATARACT EXTRACTION Bilateral     COLONOSCOPY      CYST REMOVAL      ovarian    ENDOMETRIAL BIOPSY      HEMORROIDECTOMY      TX ESOPHAGOGASTRODUODENOSCOPY TRANSORAL DIAGNOSTIC N/A 10/24/2017    Procedure: ESOPHAGOGASTRODUODENOSCOPY (EGD); Surgeon: Martina Dinero MD;  Location: [facility]; Service: Gastroenterology    SHOULDER SURGERY Bilateral     rotator cuff    TONSILLECTOMY      TOTAL ABDOMINAL HYSTERECTOMY W/ BILATERAL SALPINGOOPHORECTOMY           Review of Systems   All other systems reviewed and are negative  Objective:      /82 (BP Location: Left arm, Patient Position: Sitting, Cuff Size: Adult)   Pulse 76   Temp (!) 97 4 °F (36 3 °C) (Tympanic)   Ht 5' 2 5" (1 588 m)   Wt 73 9 kg (163 lb)   BMI 29 34 kg/m²           Physical Exam   Constitutional: She is oriented to person, place, and time  She appears well-developed and well-nourished  HENT:   Head: Normocephalic and atraumatic  Eyes: Conjunctivae and EOM are normal    Neck: Normal range of motion  Cardiovascular: Normal rate and regular rhythm  Pulmonary/Chest: Effort normal and breath sounds normal    Abdominal: Soft  Bowel sounds are normal  She exhibits no distension  There is no tenderness  Musculoskeletal: Normal range of motion  Neurological: She is alert and oriented to person, place, and time  Skin: Skin is warm and dry  Psychiatric: She has a normal mood and affect   Her behavior is normal

## 2018-03-13 ENCOUNTER — TELEPHONE (OUTPATIENT)
Dept: GASTROENTEROLOGY | Facility: CLINIC | Age: 68
End: 2018-03-13

## 2018-03-13 DIAGNOSIS — K21.9 GASTROESOPHAGEAL REFLUX DISEASE WITHOUT ESOPHAGITIS: Primary | ICD-10-CM

## 2018-03-13 NOTE — TELEPHONE ENCOUNTER
Dr Marychuy Chung pt    Pt was in the office yesterday for an appt with Mary Vee, when she left she was under the impression that she was cleared - change her diet, continue medication, call us if she has any symptoms in the future  She stated she received a call today advising her that per Mary Vee and Dr Marychuy Chung she should be having an abdominal US - pt doesn't feel this is necessary  She would like a call back from either Rebeka or Dr Marychuy Chung with an explanation on why they feel it is necessary to have this ultrasound done  Please call her back either today or Thursday, tomorrow she is working so she will not be available   [phone]

## 2018-03-13 NOTE — TELEPHONE ENCOUNTER
Spoke with pt  She would like to try diet & zantac as needed & if things don't improve then consider US    Charmayne Duster

## 2018-04-13 ENCOUNTER — TELEPHONE (OUTPATIENT)
Dept: FAMILY MEDICINE CLINIC | Facility: CLINIC | Age: 68
End: 2018-04-13

## 2018-04-13 NOTE — TELEPHONE ENCOUNTER
The comprehensive metabolic profile is normal but  unfortunately the lipid profile numbers have all worsened

## 2018-04-15 DIAGNOSIS — J30.89 ALLERGIC RHINITIS DUE TO OTHER ALLERGIC TRIGGER, UNSPECIFIED SEASONALITY: Primary | ICD-10-CM

## 2018-04-16 RX ORDER — MONTELUKAST SODIUM 10 MG/1
TABLET ORAL
Qty: 90 TABLET | Refills: 2 | Status: SHIPPED | OUTPATIENT
Start: 2018-04-16 | End: 2019-01-17 | Stop reason: SDUPTHER

## 2018-08-22 ENCOUNTER — OFFICE VISIT (OUTPATIENT)
Dept: FAMILY MEDICINE CLINIC | Facility: CLINIC | Age: 68
End: 2018-08-22
Payer: MEDICARE

## 2018-08-22 VITALS
BODY MASS INDEX: 28.95 KG/M2 | DIASTOLIC BLOOD PRESSURE: 68 MMHG | SYSTOLIC BLOOD PRESSURE: 122 MMHG | WEIGHT: 163.4 LBS | HEIGHT: 63 IN

## 2018-08-22 DIAGNOSIS — J45.20 MILD INTERMITTENT ASTHMA, UNSPECIFIED WHETHER COMPLICATED: ICD-10-CM

## 2018-08-22 DIAGNOSIS — M70.61 TROCHANTERIC BURSITIS OF RIGHT HIP: Primary | ICD-10-CM

## 2018-08-22 PROCEDURE — 20605 DRAIN/INJ JOINT/BURSA W/O US: CPT | Performed by: FAMILY MEDICINE

## 2018-08-22 PROCEDURE — 99214 OFFICE O/P EST MOD 30 MIN: CPT | Performed by: FAMILY MEDICINE

## 2018-08-22 RX ORDER — LEVALBUTEROL TARTRATE 45 UG/1
1 AEROSOL, METERED ORAL EVERY 4 HOURS PRN
Qty: 3 INHALER | Refills: 0 | Status: SHIPPED | OUTPATIENT
Start: 2018-08-22 | End: 2019-12-02 | Stop reason: SDUPTHER

## 2018-08-22 RX ORDER — MONTELUKAST SODIUM 10 MG/1
TABLET ORAL
COMMUNITY
End: 2019-01-07

## 2018-08-22 NOTE — PROGRESS NOTES
Assessment/Plan:     Diagnoses and all orders for this visit:    Trochanteric bursitis of right hip    Mild intermittent asthma, unspecified whether complicated  -     levalbuterol (XOPENEX HFA) 45 mcg/act inhaler; Inhale 1 puff every 4 (four) hours as needed for wheezing BRAND XOPONEX    Other orders  -     montelukast (SINGULAIR) 10 mg tablet; montelukast 10 mg tablet  -     Medium joint arthrocentesis        Patient is a pleasant 38-year-old female with hyperlipidemia history  She is continue to try to make efforts to increase fitness as well as dietary compliance  Diagnosis of trochanteric bursitis today was is a dressed with bursal steroid injection  Patient is given post injection instructions and will follow up with regards to response  Patient also given literature on trochanteric bursitis stretches  Asthma is controlled and controller is refilled  Patient will have a formal follow-up in 6 months with repeat of yearly labs at that time  Time spent greater than 25 minutes with greater than 50% counseling performed  Subjective:   Chief Complaint   Patient presents with    Follow-up     Here for follow up and review of meds    Hip Pain     Complaining of right hip pain which began after shoveling snow in February, has been doing home exercise program     Thumb Pain     Complaining of right thumb pain  Patient ID: Mirta Sagastume is a 69 y o  female  This is a pleasant 38-year-old female who is here for general follow-up  She has a history of hyperlipidemia and is on niacin but not a statin  She is not treated for hypertension  She has no known history of diabetes  Labs are updated March of this year  Patient is compliant with her mammogram   Bone density scan in 2015 was excellent  She has tried to remain compliant with exercise and dietary issues but it has been tough this winter  Also her  had some medical issues    Back in February during the winter and shoveling she had some issues with her back and sciatic type symptoms  That has gradually improved and resolved except there is residual right-sided hip discomfort that still persists  The following portions of the patient's history were reviewed and updated as appropriate: allergies, current medications, past family history, past medical history, past social history, past surgical history and problem list       Review of Systems   Constitutional: Negative for fatigue  HENT: Negative  Respiratory: Negative  Cardiovascular: Negative  Genitourinary: Negative  Musculoskeletal:        Discomfort with ambulation over the right trochanteric bursa present since winter   Psychiatric/Behavioral: Negative  Objective:  /68   Ht 5' 2 5" (1 588 m)   Wt 74 1 kg (163 lb 6 4 oz)   BMI 29 41 kg/m²        Physical Exam   Constitutional: She appears well-developed and well-nourished  Pleasant 56-year-old female who appears younger than her stated age with a BMI of 29% in no acute distress   HENT:   Head: Normocephalic and atraumatic  Eyes: EOM are normal  Pupils are equal, round, and reactive to light  Neck: Normal range of motion  No thyromegaly present  Cardiovascular: Normal rate, regular rhythm and intact distal pulses  No carotid abdominal femoral bruit   Pulmonary/Chest: Breath sounds normal    Abdominal: Soft  Bowel sounds are normal    Musculoskeletal:   Patient is full range of motion of lumbar spine there is no straight leg raising  Reflexes are intact  There is however point tenderness over the right great trochanteric bursa  Patient has fairly good internal external rotation of the hip  Neurological: She is alert  She has normal reflexes  Psychiatric: She has a normal mood and affect   Her behavior is normal  Judgment and thought content normal        Medium joint arthrocentesis  Date/Time: 8/22/2018 10:37 AM  Consent given by: patient  Supporting Documentation  Indications: pain Procedure Details  Location: trochanter greater   Preparation: Patient was prepped and draped in the usual sterile fashion  Ultrasound guidance: no  Approach: anterolateral  Medication group details: depomedrol 80mg  Patient tolerance: patient tolerated the procedure well with no immediate complications         Patient is instructed to ice the bursa area down 10 min on and 10 min off for the rest of the day

## 2018-08-23 NOTE — PATIENT INSTRUCTIONS
Hip Bursitis   WHAT YOU NEED TO KNOW:   Hip bursitis is inflammation of the bursa in your hip  The bursa is a fluid-filled sac that acts as a cushion between a bone and a tendon  A tendon is a cord of strong tissue that connects muscles to bones  DISCHARGE INSTRUCTIONS:   Medicines:   · NSAIDs:  These medicines decrease swelling, pain, and fever  NSAIDs are available without a doctor's order  Ask your healthcare provider which medicine is right for you  Ask how much to take and when to take it  Take as directed  NSAIDs can cause stomach bleeding and kidney problems if not taken correctly  · Antibiotics: These help fight an infection caused by bacteria  You may need antibiotics if your bursitis is caused by infection  · Take your medicine as directed  Contact your healthcare provider if you think your medicine is not helping or if you have side effects  Tell him of her if you are allergic to any medicine  Keep a list of the medicines, vitamins, and herbs you take  Include the amounts, and when and why you take them  Bring the list or the pill bottles to follow-up visits  Carry your medicine list with you in case of an emergency  Manage your symptoms:   · Rest:  Rest your hip as much as possible to decrease pain and swelling  Slowly start to do more each day  Return to your daily activities as directed  · Ice:  Ice helps decrease swelling and pain  Ice may also help prevent tissue damage  Use an ice pack, or put crushed ice in a plastic bag  Cover it with a towel and place it on your hip for 15 to 20 minutes, 3 to 4 times each day, as directed  · Sleep position:  Do not lie on your injured hip  You may be more comfortable if you sleep on your stomach or back  · Physical therapy:  A physical therapist teaches you exercises to help improve movement and strength, and to decrease pain    Prevent another hip injury:   · Stretch, warm up, and cool down:  Always stretch and do warmup and cool-down exercises before and after you exercise  This will help loosen your muscles and decrease stress on your hip  Rest between workouts  · Wear proper shoes:  Wear shoes that fit properly and support your feet  You may need to wear shoe inserts called orthotics  Orthotics help position your foot correctly as you walk or exercise  · Maintain a healthy weight:  Ask your healthcare provider how much you should weigh  Ask him to help you create a weight loss plan if you are overweight  · Keep pressure off your hips:  Do not stand or sit for long periods of time  Sit on padded surfaces, such as a cushion or pad, whenever possible  Bend your knees when you  objects from the ground  Follow up with your healthcare provider as directed:  Write down your questions so you remember to ask them during your visits  Contact your healthcare provider if:   · Your pain and swelling increase  · Your symptoms do not improve with treatment  · You have a fever  · You have questions or concerns about your condition or care  © 2017 [facility] Information is for End User's use only and may not be sold, redistributed or otherwise used for commercial purposes  All illustrations and images included in CareNotes® are the copyrighted property of [employer] , [employer]  or Massimo Krause  The above information is an  only  It is not intended as medical advice for individual conditions or treatments  Talk to your doctor, nurse or pharmacist before following any medical regimen to see if it is safe and effective for you

## 2018-09-06 ENCOUNTER — ANNUAL EXAM (OUTPATIENT)
Dept: OBGYN CLINIC | Facility: MEDICAL CENTER | Age: 68
End: 2018-09-06
Payer: MEDICARE

## 2018-09-06 VITALS — DIASTOLIC BLOOD PRESSURE: 82 MMHG | BODY MASS INDEX: 28.98 KG/M2 | WEIGHT: 161 LBS | SYSTOLIC BLOOD PRESSURE: 138 MMHG

## 2018-09-06 DIAGNOSIS — Z01.419 ENCOUNTER FOR WELL WOMAN EXAM WITH ROUTINE GYNECOLOGICAL EXAM: Primary | ICD-10-CM

## 2018-09-06 DIAGNOSIS — M85.80 OSTEOPENIA, UNSPECIFIED LOCATION: ICD-10-CM

## 2018-09-06 DIAGNOSIS — Z12.39 SCREENING FOR MALIGNANT NEOPLASM OF BREAST: ICD-10-CM

## 2018-09-06 PROCEDURE — G0101 CA SCREEN;PELVIC/BREAST EXAM: HCPCS | Performed by: OBSTETRICS & GYNECOLOGY

## 2018-09-06 NOTE — PROGRESS NOTES
ASSESSMENT & PLAN: Efraín Hernadez is a 69 y o  No obstetric history on file  with normal gynecologic exam     1   Routine well woman exam done today  2  Pap and HPV:  The patient's last pap and hpv was uncertain dating, prior hysterectomy/bso  It was normal     Pap and cotesting was not done today  Current ASCCP Guidelines reviewed  3   Mammogram ordered  4  Colonoscopy done 2015  5  DEXA 2015  6  The following were reviewed in today's visit: breast self exam, mammography screening ordered and DEXA ordered  CC:  Annual Gynecologic Examination    HPI: Efraín Hernadez is a 69 y o  No obstetric history on file  who presents for annual gynecologic examination  She has the following concerns:  Reports occasional hot flashes, tolerating well; no complaints today    Health Maintenance:    She wears her seatbelt routinely  She does perform regular monthly self breast exams  She feels safe at home  Last PAP & HPV: unsure  Last mammogram: 2017   Last colonoscopy: 2015    Past Medical History:   Diagnosis Date    Abnormal electrocardiogram     Prolonged QT    Asthma     GERD (gastroesophageal reflux disease)     Shortness of breath     Last assessed 03/12/14    Swallowing difficulty        Past Surgical History:   Procedure Laterality Date    CATARACT EXTRACTION Bilateral     COLONOSCOPY      CYST REMOVAL      ovarian    ENDOMETRIAL BIOPSY      HEMORROIDECTOMY      NH ESOPHAGOGASTRODUODENOSCOPY TRANSORAL DIAGNOSTIC N/A 10/24/2017    Procedure: ESOPHAGOGASTRODUODENOSCOPY (EGD); Surgeon: Betty Harrison MD;  Location: [facility]; Service: Gastroenterology    SHOULDER SURGERY Bilateral     rotator cuff    TONSILLECTOMY      TOTAL ABDOMINAL HYSTERECTOMY W/ BILATERAL SALPINGOOPHORECTOMY         Past OB/Gyn History:  OB History     No data available        History of abnormal pap smears: No      Patient is currently sexually active    heterosexual     Family History   Problem Relation Age of Onset    Diabetes Mother     Hypertension Mother     Skin cancer Mother     Heart disease Family     Other Family         CVA    Cancer Family        Social History:  Social History     Social History    Marital status:      Spouse name: N/A    Number of children: N/A    Years of education: N/A     Occupational History    Not on file  Social History Main Topics    Smoking status: Never Smoker    Smokeless tobacco: Never Used    Alcohol use Yes      Comment: occasionally    Drug use: No    Sexual activity: Not on file     Other Topics Concern    Not on file     Social History Narrative    No narrative on file     Presently lives with Meenakshi Muñoz    Patient is currently semi- retired     Allergies   Allergen Reactions    Amitriptyline     Seasonal Ic [Cholestatin]          Current Outpatient Prescriptions:     B Complex-Folic Acid (SUPER B COMPLEX MAXI) TABS, Take by mouth, Disp: , Rfl:     Biotin 5000 MCG TABS, Take by mouth, Disp: , Rfl:     Cholecalciferol (VITAMIN D3) 5000 units TABS, Take by mouth, Disp: , Rfl:     cyanocobalamin (VITAMIN B-12) 1,000 mcg tablet, Take by mouth daily, Disp: , Rfl:     fluticasone (FLONASE) 50 mcg/act nasal spray, 2 sprays into each nostril as needed  , Disp: , Rfl:     folic acid (FOLVITE) 1 mg tablet, Take by mouth, Disp: , Rfl:     levalbuterol (XOPENEX HFA) 45 mcg/act inhaler, Inhale 1 puff every 4 (four) hours as needed for wheezing BRAND XOPONEX, Disp: 3 Inhaler, Rfl: 0    montelukast (SINGULAIR) 10 mg tablet, TAKE 1 TABLET EVERY DAY, Disp: 90 tablet, Rfl: 2    montelukast (SINGULAIR) 10 mg tablet, montelukast 10 mg tablet, Disp: , Rfl:     niacin 500 mg tablet, Take 500 mg by mouth daily with breakfast, Disp: , Rfl:     Probiotic Product (PROBIOTIC-10 PO), Take by mouth, Disp: , Rfl:     Review of Systems  Constitutional :no fever, feels well, no tiredness, no recent weight gain or loss  ENT: no ear ache, no loss of hearing, no nosebleeds or nasal discharge, no sore throat or hoarseness  Cardiovascular: no complaints of slow or fast heart beat, no chest pain, no palpitations, no leg claudication or lower extremity edema  Respiratory: no complaints of shortness of shortness of breath, no GAGNON  Breasts:no complaints of breast pain, breast lump, or nipple discharge  Gastrointestinal: no complaints of abdominal pain, constipation, nausea, vomiting, or diarrhea or bloody stools  Genitourinary : no complaints of dysuria, incontinence, pelvic pain, no dysmenorrhea, vaginal discharge or abnormal vaginal bleeding and as noted in HPI  Musculoskeletal: no complaints of arthralgia, no myalgia, no joint swelling or stiffness, no limb pain or swelling  Integumentary: no complaints of skin rash or lesion, itching or dry skin  Neurological: no complaints of headache, no confusion, no numbness or tingling, no dizziness or fainting    Objective      /82   Wt 73 kg (161 lb)   BMI 28 98 kg/m²   General:   appears stated age, cooperative, alert normal mood and affect   Neck: normal, supple,trachea midline, no masses   Heart: regular rate and rhythm, S1, S2 normal, no murmur, click, rub or gallop   Lungs: clear to auscultation bilaterally   Breasts: normal appearance, no masses or tenderness, Inspection negative, No nipple retraction or dimpling, No nipple discharge or bleeding, No axillary or supraclavicular adenopathy, Normal to palpation without dominant masses   Abdomen: soft, non-tender, without masses or organomegaly   Vulva: normal female genitalia, Bartholin's, Urethra, Brodhead normal   Vagina: normal vagina, no discharge, exudate, lesion, or erythema   Urethra: normal   Cervix: Absent  Uterus: uterus absent   Adnexa: surgically absent   Lymphatic palpation of lymph nodes in neck, axilla, groin and/or other locations: no lymphadenopathy or masses noted   Skin normal skin turgor and no rashes     Psychiatric orientation to person, place, and time: normal  mood and affect: normal

## 2018-10-24 ENCOUNTER — IMMUNIZATION (OUTPATIENT)
Dept: FAMILY MEDICINE CLINIC | Facility: CLINIC | Age: 68
End: 2018-10-24
Payer: MEDICARE

## 2018-10-24 DIAGNOSIS — Z23 ENCOUNTER FOR IMMUNIZATION: ICD-10-CM

## 2018-10-24 PROCEDURE — 90662 IIV NO PRSV INCREASED AG IM: CPT | Performed by: FAMILY MEDICINE

## 2018-10-24 PROCEDURE — G0008 ADMIN INFLUENZA VIRUS VAC: HCPCS | Performed by: FAMILY MEDICINE

## 2018-11-02 ENCOUNTER — HOSPITAL ENCOUNTER (OUTPATIENT)
Dept: RADIOLOGY | Age: 68
Discharge: HOME | End: 2018-11-02
Payer: MEDICARE

## 2018-11-02 DIAGNOSIS — M85.80 OSTEOPENIA, UNSPECIFIED LOCATION: ICD-10-CM

## 2018-11-02 PROCEDURE — 77080 DXA BONE DENSITY AXIAL: CPT

## 2018-11-06 ENCOUNTER — HOSPITAL ENCOUNTER (OUTPATIENT)
Dept: RADIOLOGY | Age: 68
Discharge: HOME | End: 2018-11-06
Payer: MEDICARE

## 2018-11-06 DIAGNOSIS — Z12.39 SCREENING FOR MALIGNANT NEOPLASM OF BREAST: ICD-10-CM

## 2018-11-06 PROCEDURE — 77063 BREAST TOMOSYNTHESIS BI: CPT

## 2018-11-06 PROCEDURE — 77067 SCR MAMMO BI INCL CAD: CPT

## 2018-12-17 ENCOUNTER — TELEPHONE (OUTPATIENT)
Dept: FAMILY MEDICINE CLINIC | Facility: CLINIC | Age: 68
End: 2018-12-17

## 2018-12-18 NOTE — TELEPHONE ENCOUNTER
<link [email or web address]" rel="stylesheet">   [address]  [place], [address] [facility]    [phone]

## 2019-01-07 ENCOUNTER — OFFICE VISIT (OUTPATIENT)
Dept: FAMILY MEDICINE CLINIC | Facility: CLINIC | Age: 69
End: 2019-01-07
Payer: MEDICARE

## 2019-01-07 VITALS — WEIGHT: 166.8 LBS | HEIGHT: 63 IN | TEMPERATURE: 98.5 F | BODY MASS INDEX: 29.55 KG/M2

## 2019-01-07 DIAGNOSIS — B02.9 HERPES ZOSTER WITHOUT COMPLICATION: Primary | ICD-10-CM

## 2019-01-07 DIAGNOSIS — E78.2 MIXED HYPERLIPIDEMIA: ICD-10-CM

## 2019-01-07 PROCEDURE — 99213 OFFICE O/P EST LOW 20 MIN: CPT | Performed by: FAMILY MEDICINE

## 2019-01-07 RX ORDER — VALACYCLOVIR HYDROCHLORIDE 1 G/1
1000 TABLET, FILM COATED ORAL 3 TIMES DAILY
Qty: 21 TABLET | Refills: 0 | Status: SHIPPED | OUTPATIENT
Start: 2019-01-07 | End: 2019-02-28

## 2019-01-07 RX ORDER — PREDNISONE 10 MG/1
TABLET ORAL
Qty: 21 TABLET | Refills: 0 | Status: SHIPPED | OUTPATIENT
Start: 2019-01-07 | End: 2019-02-28 | Stop reason: ALTCHOICE

## 2019-01-07 NOTE — PROGRESS NOTES
Assessment/Plan:     Diagnoses and all orders for this visit:    Herpes zoster without complication  -     valACYclovir (VALTREX) 1,000 mg tablet; Take 1 tablet (1,000 mg total) by mouth 3 (three) times a day for 7 days  -     predniSONE 10 mg tablet; 6-5-4-3-2-1    Mixed hyperlipidemia  -     Lipid Panel with Direct LDL reflex; Future  -     Comprehensive metabolic panel; Future  -     TSH, 3rd generation; Future    Other orders  -     Flaxseed, Linseed, (FLAX SEEDS PO); Take by mouth  -     calcium carbonate 1250 MG capsule; Take 1,250 mg by mouth 2 (two) times a day with meals        Patient with typical rash of herpes zoster  Rx well cycle of air as well as prednisone  Patient is aware of  isolation from individuals who are immunocompromised  Also discussed upcoming appointment and lab  Time spent greater than 20 min with greater than 50% counseling performed  Subjective:   Chief Complaint   Patient presents with    Rash     on left breast, pain radiating into her back  Had shingles vaccine years ago  Denies fever  Patient ID: Mile Daniels is a 77 y o  female  Patient is a 68-year-old female who is here for evaluation of likely shingles diagnosis  She started on Saturday with some pain into the left breast and back  The rash occurred within 24 hr   Patient did have the original shingles shot several years ago  The pain is actually improved today  I also reviewed upcoming appointment end of February  Patient will have repeat labs done prior  She has been following exercise and dietary management for lipidemia        The following portions of the patient's history were reviewed and updated as appropriate: allergies, current medications, past family history, past medical history, past social history, past surgical history and problem list     Review of Systems   Constitutional: Negative for appetite change, fatigue and fever  HENT: Negative  Respiratory: Negative      Cardiovascular: Negative  Genitourinary: Negative  Musculoskeletal: Positive for arthralgias and back pain  Along the dermatome of T5-T6   Skin:        Macular papular rash consistent with zoster along T5-T6 anterior chest wall         Objective:      Temp 98 5 °F (36 9 °C) (Tympanic)   Ht 5' 2 5" (1 588 m)   Wt 75 7 kg (166 lb 12 8 oz)   BMI 30 02 kg/m²          Physical Exam   Constitutional: She is oriented to person, place, and time  Musculoskeletal: Normal range of motion  Neurological: She is alert and oriented to person, place, and time  Skin:   Patch of macular papular rash with small blisters noted left anterior chest wall breast T5-T6  There may be a very slight patch noted posteriorly along the scapula   Psychiatric: She has a normal mood and affect   Her behavior is normal  Judgment and thought content normal

## 2019-01-08 NOTE — PATIENT INSTRUCTIONS
Herpes Zoster   WHAT YOU SHOULD KNOW:   Herpes zoster (HZ) is also called shingles  It is an infection caused by the same virus that causes chickenpox (varicella-zoster virus)  After you get chickenpox, the virus stays in your body for several years without causing any symptoms  HZ occurs when the virus becomes active again  Once active, the virus will travel along a nerve to your skin and cause a rash  CARE AGREEMENT:   You have the right to help plan your care  Learn about your health condition and how it may be treated  Discuss treatment options with your caregivers to decide what care you want to receive  You always have the right to refuse treatment  RISKS:   If left untreated, HZ may cause eye problems, such as a drooping eyelid or blindness  It may lead to a brain infection or stroke  HZ can also cause nerve damage and lead to twitching, dizziness, or loss of taste and hearing  The blisters may leave scars or changes in skin color  HZ may cause pain even after the rash is gone  It may also lead to trouble moving parts of your body  WHILE YOU ARE HERE:   Informed consent  is a legal document that explains the tests, treatments, or procedures that you may need  Informed consent means you understand what will be done and can make decisions about what you want  You give your permission when you sign the consent form  You can have someone sign this form for you if you are not able to sign it  You have the right to understand your medical care in words you know  Before you sign the consent form, understand the risks and benefits of what will be done  Make sure all your questions are answered  Medicines:   · Antiviral medicine: These help decrease symptoms and healing time  They may also decrease your risk of developing nerve pain  You will need to start taking them within 3 days of the start of symptoms to prevent nerve pain  · Pain medicine:   You may need NSAIDs, acetaminophen, or opioid medicine depending on how much pain you are in  Do not wait until the pain is severe before you ask for more pain medicine  · Topical anesthetics: These are used to numb the skin and decrease pain  They can be a cream, gel, spray, or patch  · Anticonvulsants: These decrease nerve pain and may help you sleep at night  · Antidepressants: These may also be used to decrease nerve pain  · Epidural medicine: This is put into your spine to block pain  This medicine treats severe pain that does not get better with other pain medicines  Epidural medicine includes numbing medicine and steroids  Tests:  Your caregiver may send skin scrapings or fluid from your blisters for tests to see if you have HZ  © 2014 [address] [employer] is for End User's use only and may not be sold, redistributed or otherwise used for commercial purposes  All illustrations and images included in CareNotes® are the copyrighted property of A [employer] A Eve , [employer]  or Massimo Krause  The above information is an  only  It is not intended as medical advice for individual conditions or treatments  Talk to your doctor, nurse or pharmacist before following any medical regimen to see if it is safe and effective for you

## 2019-01-17 DIAGNOSIS — J30.89 ALLERGIC RHINITIS DUE TO OTHER ALLERGIC TRIGGER, UNSPECIFIED SEASONALITY: ICD-10-CM

## 2019-01-18 RX ORDER — MONTELUKAST SODIUM 10 MG/1
10 TABLET ORAL DAILY
Qty: 90 TABLET | Refills: 0 | Status: SHIPPED | OUTPATIENT
Start: 2019-01-18 | End: 2019-07-15

## 2019-02-22 ENCOUNTER — TRANSCRIBE ORDERS (OUTPATIENT)
Dept: ADMINISTRATIVE | Age: 69
End: 2019-02-22

## 2019-02-22 ENCOUNTER — APPOINTMENT (OUTPATIENT)
Dept: LAB | Age: 69
End: 2019-02-22
Payer: MEDICARE

## 2019-02-22 DIAGNOSIS — E78.2 MIXED HYPERLIPIDEMIA: ICD-10-CM

## 2019-02-22 LAB
ALBUMIN SERPL BCP-MCNC: 3.6 G/DL (ref 3.5–5)
ALP SERPL-CCNC: 69 U/L (ref 46–116)
ALT SERPL W P-5'-P-CCNC: 20 U/L (ref 12–78)
ANION GAP SERPL CALCULATED.3IONS-SCNC: 7 MMOL/L (ref 4–13)
AST SERPL W P-5'-P-CCNC: 14 U/L (ref 5–45)
BILIRUB SERPL-MCNC: 0.62 MG/DL (ref 0.2–1)
BUN SERPL-MCNC: 15 MG/DL (ref 5–25)
CALCIUM SERPL-MCNC: 8.9 MG/DL (ref 8.3–10.1)
CHLORIDE SERPL-SCNC: 103 MMOL/L (ref 100–108)
CHOLEST SERPL-MCNC: 233 MG/DL (ref 50–200)
CO2 SERPL-SCNC: 29 MMOL/L (ref 21–32)
CREAT SERPL-MCNC: 0.7 MG/DL (ref 0.6–1.3)
GFR SERPL CREATININE-BSD FRML MDRD: 88 ML/MIN/1.73SQ M
GLUCOSE P FAST SERPL-MCNC: 62 MG/DL (ref 65–99)
HDLC SERPL-MCNC: 63 MG/DL (ref 40–60)
LDLC SERPL CALC-MCNC: 149 MG/DL (ref 0–100)
POTASSIUM SERPL-SCNC: 3.8 MMOL/L (ref 3.5–5.3)
PROT SERPL-MCNC: 7.1 G/DL (ref 6.4–8.2)
SODIUM SERPL-SCNC: 139 MMOL/L (ref 136–145)
TRIGL SERPL-MCNC: 107 MG/DL
TSH SERPL DL<=0.05 MIU/L-ACNC: 2.33 UIU/ML (ref 0.36–3.74)

## 2019-02-22 PROCEDURE — 36415 COLL VENOUS BLD VENIPUNCTURE: CPT

## 2019-02-22 PROCEDURE — 84443 ASSAY THYROID STIM HORMONE: CPT

## 2019-02-22 PROCEDURE — 80061 LIPID PANEL: CPT

## 2019-02-22 PROCEDURE — 80053 COMPREHEN METABOLIC PANEL: CPT

## 2019-02-28 ENCOUNTER — OFFICE VISIT (OUTPATIENT)
Dept: FAMILY MEDICINE CLINIC | Facility: CLINIC | Age: 69
End: 2019-02-28
Payer: MEDICARE

## 2019-02-28 VITALS
SYSTOLIC BLOOD PRESSURE: 138 MMHG | WEIGHT: 165 LBS | HEIGHT: 63 IN | DIASTOLIC BLOOD PRESSURE: 80 MMHG | BODY MASS INDEX: 29.23 KG/M2

## 2019-02-28 DIAGNOSIS — M47.812 SPONDYLOSIS OF CERVICAL REGION WITHOUT MYELOPATHY OR RADICULOPATHY: Primary | ICD-10-CM

## 2019-02-28 DIAGNOSIS — Z23 ENCOUNTER FOR IMMUNIZATION: ICD-10-CM

## 2019-02-28 DIAGNOSIS — E78.2 MIXED HYPERLIPIDEMIA: ICD-10-CM

## 2019-02-28 DIAGNOSIS — Z00.00 MEDICARE ANNUAL WELLNESS VISIT, SUBSEQUENT: ICD-10-CM

## 2019-02-28 PROCEDURE — 99213 OFFICE O/P EST LOW 20 MIN: CPT | Performed by: FAMILY MEDICINE

## 2019-02-28 PROCEDURE — G0439 PPPS, SUBSEQ VISIT: HCPCS | Performed by: FAMILY MEDICINE

## 2019-02-28 PROCEDURE — 90715 TDAP VACCINE 7 YRS/> IM: CPT | Performed by: FAMILY MEDICINE

## 2019-02-28 PROCEDURE — 90471 IMMUNIZATION ADMIN: CPT | Performed by: FAMILY MEDICINE

## 2019-02-28 NOTE — PROGRESS NOTES
Assessment/Plan:     Diagnoses and all orders for this visit:    Spondylosis of cervical region without myelopathy or radiculopathy    Encounter for immunization  -     TDAP VACCINE GREATER THAN OR EQUAL TO 6YO IM Medicare annual wellness visit, subsequent    Mixed hyperlipidemia        Plan is to continue massage heat and topical pain patch for neck symptoms  Patient will do this for several weeks and let me know if there has been any improvement and if not we may refer to formal physical therapy  Subjective:   Chief Complaint   Patient presents with    Medicare Wellness Visit    Neck Pain     pt states its constent, bilateral neck pain, R side is worse, pt states that it started the end of last year   Immunizations     would like T-dap today      Patient ID: Vijaya Lewis is a 77 y o  female  HPI    The following portions of the patient's history were reviewed and updated as appropriate: allergies, current medications, past family history, past medical history, past social history, past surgical history and problem list     Review of Systems   Constitutional: Negative for fatigue and unexpected weight change  HENT: Negative  Respiratory: Negative  Cardiovascular: Negative  Gastrointestinal: Negative  Musculoskeletal: Positive for arthralgias (neck)  Neurological: Negative  Psychiatric/Behavioral: Negative  Objective:      /80   Ht 5' 2 5" (1 588 m)   Wt 74 8 kg (165 lb)   BMI 29 70 kg/m²          Physical Exam   Constitutional: She is oriented to person, place, and time  She appears well-developed and well-nourished  HENT:   Head: Normocephalic and atraumatic  Eyes: Pupils are equal, round, and reactive to light  Cardiovascular: Normal rate and regular rhythm  Pulmonary/Chest: Effort normal and breath sounds normal    Abdominal: Soft   Bowel sounds are normal    Musculoskeletal:   Decreased range of motion especially in right rotation of the cervical spine   Neurological: She is alert and oriented to person, place, and time  Psychiatric: She has a normal mood and affect  Her behavior is normal  Judgment and thought content normal    Vitals reviewed

## 2019-02-28 NOTE — PROGRESS NOTES
Assessment and Plan:    Problem List Items Addressed This Visit     None      Visit Diagnoses     Medicare annual wellness visit, subsequent    -  Primary        Health Maintenance Due   Topic Date Due    Medicare Annual Wellness Visit (AWV)  1950    SLP PLAN OF CARE  1950    BMI: Followup Plan  09/18/1966    DTaP,Tdap,and Td Vaccines (1 - Tdap) 09/18/1969         HPI:  Alejandra Hsu is a 77 y o  female here for her Subsequent Wellness Visit  Patient Active Problem List   Diagnosis    Allergic rhinitis    Arthritis    Asthma    Gastroesophageal reflux disease without esophagitis    Mixed hyperlipidemia    Osteoarthritis    Osteopenia    Rotator cuff syndrome of right shoulder    Vitamin B12 deficiency    Vitamin D deficiency     Past Medical History:   Diagnosis Date    Abnormal electrocardiogram     Prolonged QT    Asthma     GERD (gastroesophageal reflux disease)     Shortness of breath     Last assessed 03/12/14    Swallowing difficulty      Past Surgical History:   Procedure Laterality Date    CATARACT EXTRACTION Bilateral     COLONOSCOPY      CYST REMOVAL      ovarian    ENDOMETRIAL BIOPSY      HEMORROIDECTOMY      [place] ESOPHAGOGASTRODUODENOSCOPY TRANSORAL DIAGNOSTIC N/A 10/24/2017    Procedure: ESOPHAGOGASTRODUODENOSCOPY (EGD); Surgeon: Na Herron MD;  Location: [facility];   Service: Gastroenterology    SHOULDER SURGERY Bilateral     rotator cuff    TONSILLECTOMY      TOTAL ABDOMINAL HYSTERECTOMY W/ BILATERAL SALPINGOOPHORECTOMY       Family History   Problem Relation Age of Onset    Diabetes Mother     Hypertension Mother     Skin cancer Mother     Heart disease Family     Other Family         CVA    Cancer Family      Social History     Tobacco Use   Smoking Status Never Smoker   Smokeless Tobacco Never Used     Social History     Substance and Sexual Activity   Alcohol Use Yes    Comment: occasionally      Social History     Substance and Sexual Activity Drug Use No       Current Outpatient Medications   Medication Sig Dispense Refill    B Complex-Folic Acid (SUPER B COMPLEX MAXI) TABS Take by mouth      Biotin 5000 MCG TABS Take by mouth      calcium carbonate 1250 MG capsule Take 1,250 mg by mouth 2 (two) times a day with meals      Cholecalciferol (VITAMIN D3) 5000 units TABS Take by mouth      cyanocobalamin (VITAMIN B-12) 1,000 mcg tablet Take by mouth daily      Flaxseed, Linseed, (FLAX SEEDS PO) Take by mouth      fluticasone (FLONASE) 50 mcg/act nasal spray 2 sprays into each nostril as needed        levalbuterol (XOPENEX HFA) 45 mcg/act inhaler Inhale 1 puff every 4 (four) hours as needed for wheezing BRAND XOPONEX 3 Inhaler 0    montelukast (SINGULAIR) 10 mg tablet Take 1 tablet (10 mg total) by mouth daily 90 tablet 0    niacin 500 mg tablet Take 500 mg by mouth daily with breakfast      Probiotic Product (PROBIOTIC-10 PO) Take by mouth      folic acid (FOLVITE) 1 mg tablet Take by mouth       No current facility-administered medications for this visit  Allergies   Allergen Reactions    Amitriptyline     Seasonal Ic [Cholestatin]      Immunization History   Administered Date(s) Administered    H1N1, All Formulations 11/04/2009    Influenza Split High Dose Preservative Free IM 10/31/2016, 09/29/2017    Influenza TIV (IM) 10/01/2015    Influenza, high dose seasonal 0 5 mL 10/24/2018    Pneumococcal Conjugate 13-Valent 02/21/2018    Pneumococcal Polysaccharide PPV23 01/18/2017    Zoster 08/30/2016       Patient Care Team:  Sue Rosen DO as PCP - General  Kamilah Spicer MD    Medicare Screening Tests and Risk Assessments:  Ginger is here for her Subsequent Wellness visit  Health Risk Assessment:  Patient rates overall health as excellent  Patient feels that their physical health rating is Same  Eyesight was rated as Same  Hearing was rated as Same  Patient feels that their emotional and mental health rating is Same   Pain experienced by patient in the last 7 days has been A lot  Patient's pain rating has been 4/10  Patient states that she has experienced no weight loss or gain in last 6 months  Emotional/Mental Health:  Patient has been feeling nervous/anxious  PHQ-9 Depression Screening:    Frequency of the following problems over the past two weeks:      1  Little interest or pleasure in doing things: 0 - not at all      2  Feeling down, depressed, or hopeless: 0 - not at all  PHQ-2 Score: 0          Broken Bones/Falls: Fall Risk Assessment:    In the past year, patient has experienced: No history of falling in past year          Bladder/Bowel:  Patient has not leaked urine accidently in the last six months  Patient reports no loss of bowel control  Immunizations:  Patient has had a flu vaccination within the last year  Patient has received a pneumonia shot  Patient has received a shingles shot  Patient has not received tetanus/diphtheria shot  Home Safety:  Patient does not have trouble with stairs inside or outside of their home  Patient currently reports that there are no safety hazards present in home, working smoke alarms, working carbon monoxide detectors  Preventative Screenings:   Breast cancer screening performed, colon cancer screen completed, cholesterol screen completed, glaucoma eye exam completed,     Nutrition:  Current diet: Regular with servings of the following:    Medications:  Patient is currently taking over-the-counter supplements  List of OTC medications includes: vitamin D3, b-12, calcium carbonate,   Patient is able to manage medications  Lifestyle Choices:  Patient reports no tobacco use  Patient has not smoked or used tobacco in the past   Patient reports alcohol use  Alcohol use per week: 1 glass   Patient drives a vehicle  Patient wears seat belt  Current level of exercise of physical activity described by patient as: 15 mintutes daily           Activities of Daily Living:  Can get out of bed by his or her self, able to dress self, able to make own meals, able to do own shopping, able to bathe self, can do own laundry/housekeeping, can manage own money, pay bills and track expenses    Previous Hospitalizations:  No hospitalization or ED visit in past 12 months        Advanced Directives:  Patient has decided on a power of   Patient has spoken to designated power of   Patient has not completed advanced directive  Preventative Screening/Counseling:      Cardiovascular:      General: Screening Current          Diabetes:      General: Screening Current          Colorectal Cancer:      General: Screening Current          Breast Cancer:      General: Screening Current          Cervical Cancer:      General: Screening Not Indicated          Osteoporosis:      General: Screening Current          Glaucoma:      General: Screening Not Indicated          HIV:      General: Screening Not Indicated          Hepatitis C:      General: Screening Not Indicated        Advanced Directives:   Patient has living will for healthcare, has durable POA for healthcare, patient has an advanced directive  End of life assessment reviewed with patient  Provider agrees with end of life decisions

## 2019-07-15 ENCOUNTER — OFFICE VISIT (OUTPATIENT)
Dept: FAMILY MEDICINE CLINIC | Facility: CLINIC | Age: 69
End: 2019-07-15
Payer: MEDICARE

## 2019-07-15 VITALS
BODY MASS INDEX: 28 KG/M2 | HEART RATE: 68 BPM | WEIGHT: 164 LBS | TEMPERATURE: 98.1 F | HEIGHT: 64 IN | DIASTOLIC BLOOD PRESSURE: 80 MMHG | SYSTOLIC BLOOD PRESSURE: 130 MMHG | OXYGEN SATURATION: 98 %

## 2019-07-15 DIAGNOSIS — J30.89 ALLERGIC RHINITIS DUE TO OTHER ALLERGIC TRIGGER, UNSPECIFIED SEASONALITY: Primary | ICD-10-CM

## 2019-07-15 DIAGNOSIS — M47.812 SPONDYLOSIS OF CERVICAL REGION WITHOUT MYELOPATHY OR RADICULOPATHY: ICD-10-CM

## 2019-07-15 DIAGNOSIS — M54.2 NECK PAIN: ICD-10-CM

## 2019-07-15 DIAGNOSIS — M50.30 DDD (DEGENERATIVE DISC DISEASE), CERVICAL: ICD-10-CM

## 2019-07-15 PROCEDURE — 99213 OFFICE O/P EST LOW 20 MIN: CPT | Performed by: FAMILY MEDICINE

## 2019-07-15 RX ORDER — PREDNISONE 10 MG/1
TABLET ORAL
Qty: 21 TABLET | Refills: 0 | Status: SHIPPED | OUTPATIENT
Start: 2019-07-15 | End: 2019-09-05 | Stop reason: ALTCHOICE

## 2019-07-15 NOTE — PROGRESS NOTES
Assessment/Plan:    Treat both allergy and neck related issues with the 6 day course of prednisone  Continue Zyrtec  Singular did not help and in fact made worse  Continue Flonase  Referred to [facility]'s physical therapy for cervical degenerative disc disease  Diagnoses and all orders for this visit:    Allergic rhinitis due to other allergic trigger, unspecified seasonality  -     predniSONE 10 mg tablet; 6 tabs Day 1, 5 tabs Day 2, 4 tabs Day 3, 3 tabs Day 4, 2 tabs Day 5, 1 tab Day 6  DDD (degenerative disc disease), cervical    Spondylosis of cervical region without myelopathy or radiculopathy  -     predniSONE 10 mg tablet; 6 tabs Day 1, 5 tabs Day 2, 4 tabs Day 3, 3 tabs Day 4, 2 tabs Day 5, 1 tab Day 6   -     Ambulatory referral to Physical Therapy; Future    Neck pain  -     Ambulatory referral to Physical Therapy; Future        1  Allergic rhinitis due to other allergic trigger, unspecified seasonality  predniSONE 10 mg tablet   2  DDD (degenerative disc disease), cervical     3  Spondylosis of cervical region without myelopathy or radiculopathy  predniSONE 10 mg tablet    Ambulatory referral to Physical Therapy   4  Neck pain  Ambulatory referral to Physical Therapy       Subjective:        Patient ID: Omid Sy is a 77 y o  female  Chief Complaint   Patient presents with    Headache     headaches started last Sat  come and off  pt states that this morning she did not wake up with a headache but she can feel one coming  Pt thinks this may be a combinanation of neck pain and allergies        Patient here with mostly neck complaints as well as possible flare-up of allergies  States singular made her more congested therefore she stopped it  Is taking Zyrtec along with her Flonase    States she has difficulties looking down especially when she is crocheting      The following portions of the patient's history were reviewed and updated as appropriate: past medical history, past surgical history and problem list       Review of Systems   Constitutional: Negative for fatigue and fever  HENT: Positive for postnasal drip, rhinorrhea and sneezing  Negative for congestion, sinus pressure, sinus pain and sore throat  Respiratory: Negative for cough and shortness of breath  Cardiovascular: Negative for chest pain  Musculoskeletal: Positive for neck pain and neck stiffness  Objective:  /80 (BP Location: Left arm, Patient Position: Sitting, Cuff Size: Standard)   Pulse 68   Temp 98 1 °F (36 7 °C)   Ht 5' 3 5" (1 613 m)   Wt 74 4 kg (164 lb)   SpO2 98%   BMI 28 60 kg/m²        Physical Exam   Constitutional: She is oriented to person, place, and time  She appears well-nourished  HENT:   Head: Normocephalic and atraumatic  Right Ear: Tympanic membrane and external ear normal    Left Ear: Tympanic membrane and external ear normal    Nose: Nose normal    Mouth/Throat: Oropharynx is clear and moist    Cardiovascular: Normal heart sounds  Pulmonary/Chest: Breath sounds normal    Musculoskeletal:   Decreased cervical range of motion worse to the right than the left  Restricted with flexion and extension   Lymphadenopathy:     She has no cervical adenopathy  Neurological: She is alert and oriented to person, place, and time  She displays normal reflexes  Psychiatric: She has a normal mood and affect  Nursing note and vitals reviewed

## 2019-07-23 ENCOUNTER — EVALUATION (OUTPATIENT)
Dept: PHYSICAL THERAPY | Age: 69
End: 2019-07-23
Payer: MEDICARE

## 2019-07-23 DIAGNOSIS — M47.812 SPONDYLOSIS OF CERVICAL REGION WITHOUT MYELOPATHY OR RADICULOPATHY: Primary | ICD-10-CM

## 2019-07-23 DIAGNOSIS — M54.2 NECK PAIN: ICD-10-CM

## 2019-07-23 PROCEDURE — 97162 PT EVAL MOD COMPLEX 30 MIN: CPT | Performed by: PHYSICAL THERAPIST

## 2019-07-23 NOTE — PROGRESS NOTES
PT Evaluation     Today's date: 2019  Patient name: Francisco Garcia  : 1950  MRN: [id number]  Referring provider: Anibal Flowers DO  Dx:   Encounter Diagnosis     ICD-10-CM    1  Spondylosis of cervical region without myelopathy or radiculopathy M47 812 Ambulatory referral to Physical Therapy   2  Neck pain M54 2 Ambulatory referral to Physical Therapy                  Assessment  Assessment details: Patient seen for PT evaluation for cervical spondylosis  Patient presents with increased neck pain with poor posture and/or head down posture  Patient has already started to modify her recreational activities, having a hard time modifying her daily routine (ie walking her dog, using the computer at work) to manage her symptoms  Administered exercises for strengthening postural musculature  Impairments: abnormal gait, abnormal or restricted ROM, activity intolerance, impaired physical strength, lacks appropriate home exercise program, pain with function, poor posture  and poor body mechanics  Functional limitations: Patient reports moderate pain/limitations with household chores, with IADLs, with recreational activities and with work duties  Understanding of Dx/Px/POC: good   Prognosis: good    Goals  Impairment Goals to be met within 4 weeks  - Decrease pain to 0/10 at rest, 3/10 with activity  - Improve ROM to 5-10 degrees   - Increase strength to 5/5 throughout  - Patient to be able to maintain head neutral posture throughout the day  Functional Goals to be met within 4-6 weeks  - Return to Prior Level of Function  - Increase Functional Status Measure to: expected  - Patient will be independent with HEP  - Patient to be able to tolerate walking her dog without neck pain         Plan  Patient would benefit from: skilled physical therapy  Planned modality interventions: cryotherapy and thermotherapy: hydrocollator packs  Planned therapy interventions: abdominal trunk stabilization, joint mobilization, manual therapy, neuromuscular re-education, patient education, postural training, strengthening, stretching, therapeutic activities, therapeutic exercise, home exercise program, gait training and balance  Frequency: 2x week  Duration in weeks: 6  Treatment plan discussed with: patient        Subjective Evaluation    History of Present Illness  Mechanism of injury: Patient reports long history of neck pain; off/on for years  Patient has been making adjustments over the years to manage her neck pain  Patient reports that she finally found a pillow, uses a neck rest/pillow when lying/sitting in her recliner  Patient reports that she's better since she finished a course of prednisone  Patient is working more hours, seated desk work  Patient reports neck pain with with household chores, with computer use and with crocheting  Patient feels that her neck pain has progressively worsened over the years; is exercising daily- her neck to keep her motion and strength  Pain  Current pain ratin  At best pain ratin  At worst pain ratin  Location: neck   Quality: dull ache, sharp, tight and discomfort  Aggravating factors: standing, overhead activity and lifting  Progression: improved    Patient Goals  Patient goals for therapy: decreased pain, increased motion, increased strength and return to sport/leisure activities          Objective     Concurrent Complaints  Positive for headaches  Negative for night pain, disturbed sleep, shortness of breath, respiratory pain and visual change    Postural Observations  Seated posture: fair  Standing posture: fair  Correction of posture: makes symptoms better    Additional Postural Observation Details  Patient is aware of upright posture; however, lacks muscle strength to be able to sustain the upright posture       Neurological Testing     Sensation   Cervical/Thoracic   Left   Intact: light touch    Right   Intact: light touch    Active Range of Motion Cervical/Thoracic Spine       Cervical    Flexion: 45 degrees   Extension: 34 degrees      Left lateral flexion: 20 degrees      Right lateral flexion: 20 degrees      Left rotation: 63 degrees with pain  Right rotation: 68 degrees    with pain  Left Shoulder   Normal active range of motion    Right Shoulder   Normal active range of motion    Left Elbow   Normal active range of motion    Right Elbow   Normal active range of motion    Strength/Myotome Testing     Left Shoulder     Planes of Motion   Flexion: 3+   Abduction: 3+   External rotation at 0°: 4   Internal rotation at 0°: 4     Right Shoulder     Planes of Motion   Flexion: 3+   Abduction: 3+   External rotation at 0°: 4   Internal rotation at 0°: 4     Left Elbow   Flexion: 4-  Extension: 4-    Right Elbow   Flexion: 4-  Extension: 4-  Neuro Exam:     Headaches   Patient reports headaches: Yes                Precautions none    Specialty Daily Treatment Diary       Manual        Cervical distraction                        Exercise Diary                 UBE BW                Corner pec stretch        TB rows and extension        TB B ER                Wall push ups                Standing posture at the wall                Prone B Is and Ts                                                                        Modalities                                Progress as able

## 2019-07-29 ENCOUNTER — OFFICE VISIT (OUTPATIENT)
Dept: PHYSICAL THERAPY | Age: 69
End: 2019-07-29
Payer: MEDICARE

## 2019-07-29 DIAGNOSIS — M47.812 SPONDYLOSIS OF CERVICAL REGION WITHOUT MYELOPATHY OR RADICULOPATHY: Primary | ICD-10-CM

## 2019-07-29 DIAGNOSIS — M54.2 NECK PAIN: ICD-10-CM

## 2019-07-29 PROCEDURE — 97110 THERAPEUTIC EXERCISES: CPT | Performed by: PHYSICAL THERAPIST

## 2019-07-29 PROCEDURE — 97140 MANUAL THERAPY 1/> REGIONS: CPT | Performed by: PHYSICAL THERAPIST

## 2019-07-29 NOTE — PROGRESS NOTES
Daily Note     Today's date: 2019  Patient name: Jasiel Aguila  : 1950  MRN: [id number]  Referring provider: Raven Michelle DO  Dx:   Encounter Diagnosis     ICD-10-CM    1  Spondylosis of cervical region without myelopathy or radiculopathy M47 812    2  Neck pain M54 2                   Subjective: Main c/o is pain more at R side of neck vs L  Feels pain is aggravated by cervical flexion and poor posture      Objective: See treatment diary below  Manual             Cervical distraction  5min            man ut str  [id number]                         Exercise Diary                            UBE BW  6min            cervical retraction in supine &sit  20           Corner pec stretch  [id number]           TB rows and extension  red 2x10           TB B ER  red 2x10                         Wall push ups                           Standing posture at the wall                           Prone B Is and Ts  2x10                                                                                                                           Modalities                                                            Assessment: Tolerated treatment well  Liked manual tx  Focused on postural ex to emphasize better neck and shld posture   Noted weakness and tightness with tband with ER on R as well today      Plan: Cont PT per LPT plan     Precautions none    Specialty Daily Treatment Diary       Manual        Cervical distraction                        Exercise Diary                 UBE BW                Corner pec stretch        TB rows and extension        TB B ER                Wall push ups                Standing posture at the wall                Prone B Is and Ts                                                                        Modalities                                Progress as able

## 2019-08-01 ENCOUNTER — OFFICE VISIT (OUTPATIENT)
Dept: PHYSICAL THERAPY | Age: 69
End: 2019-08-01
Payer: MEDICARE

## 2019-08-01 DIAGNOSIS — M54.2 NECK PAIN: ICD-10-CM

## 2019-08-01 DIAGNOSIS — M47.812 SPONDYLOSIS OF CERVICAL REGION WITHOUT MYELOPATHY OR RADICULOPATHY: Primary | ICD-10-CM

## 2019-08-01 PROCEDURE — 97140 MANUAL THERAPY 1/> REGIONS: CPT | Performed by: PHYSICAL THERAPIST

## 2019-08-01 PROCEDURE — 97110 THERAPEUTIC EXERCISES: CPT | Performed by: PHYSICAL THERAPIST

## 2019-08-01 NOTE — PROGRESS NOTES
Daily Note     Today's date: 2019  Patient name: Lindsey Choi  : 1950  MRN: [id number]  Referring provider: Byron Rosen DO  Dx:   Encounter Diagnosis     ICD-10-CM    1  Spondylosis of cervical region without myelopathy or radiculopathy M47 812    2  Neck pain M54 2                   Subjective: Patient reports that she's noticing how often (in a day) she puts her head down- ie to wash the dishes, to clean the kitchen, walking her dog, etc  She's finding that she has to lift her head and is more aware of her posture  Recommended patient to change activities (ie wash less dishes at a time, or to stand with upright posture for a few minutes, to perform cervical retractions to manage/reverse head down posture  Assessment: Continued with manual  Felt manual stretching really makes a difference, reporting improved cervical rotation since IE  Good postural awareness        Plan: Cont PT per LPT plan     Precautions none    Specialty Daily Treatment Diary       Manual       Cervical distraction 5min 5'       Man UT str 5x:20 5x:20              Exercise Diary                 UBE BW 6' 6'              Corner pec stretch 5x:20 5x:20      TB rows and extension RTB 2x10 RTB 2x10      TB B ER R TB 2x10 RTB 2x10              Wall push ups                Standing posture at the wall                Prone B Is and Ts 2x10 2x10                                                                      Modalities                                Progress as able

## 2019-08-05 ENCOUNTER — OFFICE VISIT (OUTPATIENT)
Dept: PHYSICAL THERAPY | Age: 69
End: 2019-08-05
Payer: MEDICARE

## 2019-08-05 DIAGNOSIS — M47.812 SPONDYLOSIS OF CERVICAL REGION WITHOUT MYELOPATHY OR RADICULOPATHY: Primary | ICD-10-CM

## 2019-08-05 PROCEDURE — 97110 THERAPEUTIC EXERCISES: CPT

## 2019-08-05 PROCEDURE — 97140 MANUAL THERAPY 1/> REGIONS: CPT

## 2019-08-05 NOTE — PROGRESS NOTES
Daily Note     Today's date: 2019  Patient name: Kobi Tai  : 1950  MRN: [id number]  Referring provider: Lindsay Friend DO  Dx:   Encounter Diagnosis     ICD-10-CM    1  Spondylosis of cervical region without myelopathy or radiculopathy M47 812                   Subjective: Reports pain remains when she moves her neck in any direction but does feel pain is less since starting therapy    Assessment: Continued with manual tx and pt continues to feel increased mobility and decreased pain as a result  Good postural awareness        Plan: Cont PT per LPT plan     Precautions none    Specialty Daily Treatment Diary       Manual      Cervical distraction 5min 5'  5 min     Man UT str 5x:20 5x:20 5x 20s             Exercise Diary                 UBE BW 6' 6' 8m             Corner pec stretch 5x:20 5x:20 5 x 20s     TB rows and extension RTB 2x10 RTB 2x10 RTB 2x10     TB B ER R TB 2x10 RTB 2x10 RTB 2x10             Wall push ups                Standing posture at the wall                Prone B Is and Ts 2x10 2x10 2x10                                                                     Modalities                                Progress as able

## 2019-08-16 ENCOUNTER — OFFICE VISIT (OUTPATIENT)
Dept: PHYSICAL THERAPY | Age: 69
End: 2019-08-16
Payer: MEDICARE

## 2019-08-16 DIAGNOSIS — M54.2 NECK PAIN: ICD-10-CM

## 2019-08-16 DIAGNOSIS — M47.812 SPONDYLOSIS OF CERVICAL REGION WITHOUT MYELOPATHY OR RADICULOPATHY: Primary | ICD-10-CM

## 2019-08-16 PROCEDURE — 97140 MANUAL THERAPY 1/> REGIONS: CPT | Performed by: PHYSICAL THERAPIST

## 2019-08-16 PROCEDURE — 97110 THERAPEUTIC EXERCISES: CPT | Performed by: PHYSICAL THERAPIST

## 2019-08-16 NOTE — PROGRESS NOTES
Daily Note     Today's date: 2019  Patient name: Roberto Smith  : 1950  MRN: [id number]  Referring provider: Riki Julian DO  Dx:   Encounter Diagnosis     ICD-10-CM    1  Spondylosis of cervical region without myelopathy or radiculopathy M47 812    2  Neck pain M54 2                   Subjective: Pt states that her neck is feeling good, is able to move her neck in a greater range of motion without pain  Assessment: Pt demonstrated increased ROM post tx  Able to progress in repetitions of most exercises and demonstrated moderate fatigue post tx  Exercise added to encourage proper posture and pt educated on proper sitting posture this session  Pt would benefit from continued PT services to reduce pain and increase level of function       Plan: Cont PT per LPT plan     Precautions none    Specialty Daily Treatment Diary       Manual     Cervical distraction 5min 5'  5 min 5'    Man UT str 5x:20 5x:20 5x 25s 5x20"            Exercise Diary                 UBE BW 6' 6' 8m 8'            Corner pec stretch 5x:20 5x:20 5 x 20s HEP    TB rows and extension RTB 2x10 RTB 2x10 RTB 2x10 RTB, 3x15    TB B ER R TB 2x10 RTB 2x10 RTB 2x10 RTB 3x10x3"    No money    RTB 3x15x3"    Wall push ups                Standing posture at the wall                Prone B Is and Ts 2x10 2x10 2x10 2x15 each    90/90 horiz abd at wall    3x8x3"                                                            Modalities                                Progress as able

## 2019-08-19 ENCOUNTER — EVALUATION (OUTPATIENT)
Dept: PHYSICAL THERAPY | Age: 69
End: 2019-08-19
Payer: MEDICARE

## 2019-08-19 DIAGNOSIS — M54.2 NECK PAIN: ICD-10-CM

## 2019-08-19 DIAGNOSIS — M47.812 SPONDYLOSIS OF CERVICAL REGION WITHOUT MYELOPATHY OR RADICULOPATHY: Primary | ICD-10-CM

## 2019-08-19 PROCEDURE — 97110 THERAPEUTIC EXERCISES: CPT | Performed by: PHYSICAL THERAPIST

## 2019-08-19 PROCEDURE — 97140 MANUAL THERAPY 1/> REGIONS: CPT | Performed by: PHYSICAL THERAPIST

## 2019-08-19 NOTE — PROGRESS NOTES
PT Discharge    Today's date: 2019  Patient name: Omid Sy  : 1950  MRN: [id number]  Referring provider: David Perdomo DO  Dx:   Encounter Diagnosis     ICD-10-CM    1  Spondylosis of cervical region without myelopathy or radiculopathy M47 812    2  Neck pain M54 2                   Assessment  Assessment details: Patient seen for PT re-assessment  Discussed patient's decrease in pain and improved UE strength, presents with decreased cervical ROM (prior to stretching) since IE  Patient feels that today is a bad day, she's more stiff/tight  Discussed patient using home traction device or having her  do manual distraction to help manage her pain, stiffness and tightness  Patient is compliant with HEP and reports less headaches and less neck pain since IE  PT recommending discharge to HEP  Impairments: abnormal gait, abnormal or restricted ROM, activity intolerance, impaired physical strength, lacks appropriate home exercise program, pain with function, poor posture  and poor body mechanics  Functional limitations: Patient reports minimal pain/limitations with household chores, with IADLs, with recreational activities and with work duties  Notices that she needs to modify her activities and understands it may take some time before becoming natural Understanding of Dx/Px/POC: good   Prognosis: good    Goals  Impairment Goals to be met within 4 weeks  - Decrease pain to 0/10 at rest, 3/10 with activity  - Improve ROM to 5-10 degrees   - Increase strength to 5/5 throughout  - Patient to be able to maintain head neutral posture throughout the day  Functional Goals to be met within 4-6 weeks  - Return to Prior Level of Function  - Increase Functional Status Measure to: expected  - Patient will be independent with HEP  - Patient to be able to tolerate walking her dog without neck pain         Plan  Patient would benefit from: skilled physical therapy  Planned modality interventions: cryotherapy and thermotherapy: hydrocollator packs  Planned therapy interventions: abdominal trunk stabilization, joint mobilization, manual therapy, neuromuscular re-education, patient education, postural training, strengthening, stretching, therapeutic activities, therapeutic exercise, home exercise program, gait training and balance  Treatment plan discussed with: patient        Subjective Evaluation    History of Present Illness  Mechanism of injury: Patient feels therapy helps with her pain, continues to feel aches and pains at times  Discussed cervical traction device for home to use as needed to manage neck pain and muscle tightness  Pain  Current pain ratin  At best pain ratin  At worst pain ratin  Location: neck   Quality: dull ache, sharp, tight and discomfort  Aggravating factors: standing, overhead activity and lifting  Progression: improved    Patient Goals  Patient goals for therapy: decreased pain, increased motion, increased strength and return to sport/leisure activities          Objective     Concurrent Complaints  Positive for headaches  Negative for night pain, disturbed sleep, shortness of breath, respiratory pain and visual change    Postural Observations  Seated posture: good  Standing posture: good  Correction of posture: makes symptoms better    Additional Postural Observation Details  Patient is aware of upright posture; however, lacks muscle strength to be able to sustain the upright posture       Neurological Testing     Sensation   Cervical/Thoracic   Left   Intact: light touch    Right   Intact: light touch    Active Range of Motion   Cervical/Thoracic Spine       Cervical    Flexion: 40 degrees   Extension: 25 degrees      Left lateral flexion: 25 degrees      Right lateral flexion: 25 degrees      Left rotation: 50 degrees with pain  Right rotation: 63 degrees    with pain  Left Shoulder   Normal active range of motion    Right Shoulder   Normal active range of motion    Left Elbow   Normal active range of motion    Right Elbow   Normal active range of motion    Strength/Myotome Testing     Left Shoulder     Planes of Motion   Flexion: 4+   Abduction: 4+   External rotation at 0°: 4+   Internal rotation at 0°: 5     Right Shoulder     Planes of Motion   Flexion: 4+   Abduction: 4+   External rotation at 0°: 4   Internal rotation at 0°: 5     Left Elbow   Flexion: 4  Extension: 4    Right Elbow   Flexion: 4  Extension: 4  Neuro Exam:     Headaches   Patient reports headaches: Yes                Precautions none    Specialty Daily Treatment Diary       Manual 7/29 8/1 8/5 8/16 8/19   Cervical distraction 5min 5'  5 min 5' 5'   Man UT str 5x:20 5x:20 5x 20s 5x20" 5x:20           Exercise Diary                 UBE BW 6' 6' 8m 8' -           Corner pec stretch 5x:20 5x:20 5 x 20s HEP -   TB rows and extension RTB 2x10 RTB 2x10 RTB 2x10 RTB, 3x15 RTB 3x15   TB B ER R TB 2x10 RTB 2x10 RTB 2x10 RTB 3x10x3" RTB single ER 2x10   No money    RTB 3x15x3" YTB 2x10   Wall push ups                Standing posture at the wall                Prone B Is and Ts 2x10 2x10 2x10 2x15 each 2x15   90/90 horiz abd at wall    3x8x3" -                                                           Modalities                                Progress as able

## 2019-08-30 ENCOUNTER — OFFICE VISIT (OUTPATIENT)
Dept: URGENT CARE | Age: 69
End: 2019-08-30
Payer: MEDICARE

## 2019-08-30 VITALS
RESPIRATION RATE: 18 BRPM | SYSTOLIC BLOOD PRESSURE: 147 MMHG | OXYGEN SATURATION: 97 % | HEART RATE: 77 BPM | DIASTOLIC BLOOD PRESSURE: 75 MMHG | TEMPERATURE: 97 F

## 2019-08-30 DIAGNOSIS — H11.31 SUBCONJUNCTIVAL HEMORRHAGE OF RIGHT EYE: ICD-10-CM

## 2019-08-30 DIAGNOSIS — H02.89 IRRITATION OF EYELID: Primary | ICD-10-CM

## 2019-08-30 PROCEDURE — G0463 HOSPITAL OUTPT CLINIC VISIT: HCPCS | Performed by: FAMILY MEDICINE

## 2019-08-30 PROCEDURE — 99212 OFFICE O/P EST SF 10 MIN: CPT | Performed by: FAMILY MEDICINE

## 2019-08-30 NOTE — PATIENT INSTRUCTIONS
Please do not rub your eyes  Rest, limit heavy activity  Continue warm soaks to area 1 to 2 times a day for 15-20 minutes  Cortisporin eye ointment to right lower eyelid once or twice a day  Recheck/follow-up with ophthalmologist as discussed  Please go to the hospital emergency department if needed

## 2019-08-30 NOTE — PROGRESS NOTES
[id number]        NAME: Lindsey Choi is a 77 y o  female  : 1950    MRN: [id number]  DATE: 2019  TIME: 8:03 AM    Assessment and Plan   Irritation of eyelid [H02 89]  1  Irritation of eyelid  crmb-zhpksfi-jmq-hydrocort (CORTISPORIN) 1 % ointment   2  Subconjunctival hemorrhage of right eye           Patient Instructions     Patient Instructions   Please do not rub your eyes  Rest, limit heavy activity  Continue warm soaks to area 1 to 2 times a day for 15-20 minutes  Cortisporin eye ointment to right lower eyelid once or twice a day  Recheck/follow-up with ophthalmologist as discussed  Please go to the hospital emergency department if needed  Follow up with Ophthalmologist/PCP in 3-5 days  Proceed to  ER if symptoms worsen  Chief Complaint     Chief Complaint   Patient presents with    Eye Problem     x 2 weeks, insect bite to right lower eye lid         History of Present Illness       Redness, swelling, and itching of the right lower eyelid for the past 2 weeks (patient states she got stung by an insect); patient states she rubbed her right eye and now there is redness of the right eye; no swelling of lips, tongue, or throat; no tightness in chest or trouble breathing      Review of Systems   Review of Systems   Eyes:        Redness, swelling, and itching of the right lower eyelid; redness of the right eye   All other systems reviewed and are negative          Current Medications       Current Outpatient Medications:     B Complex-Folic Acid (SUPER B COMPLEX MAXI) TABS, Take by mouth, Disp: , Rfl:     calcium carbonate 1250 MG capsule, Take 1,250 mg by mouth 2 (two) times a day with meals, Disp: , Rfl:     Cholecalciferol (VITAMIN D3) 5000 units TABS, Take by mouth, Disp: , Rfl:     fluticasone (FLONASE) 50 mcg/act nasal spray, 2 sprays into each nostril as needed  , Disp: , Rfl:     levalbuterol (XOPENEX HFA) 45 mcg/act inhaler, Inhale 1 puff every 4 (four) hours as needed for wheezing BRAND XOPONEX, Disp: 3 Inhaler, Rfl: 0    yrup-lfqgago-jhd-hydrocort (CORTISPORIN) 1 % ointment, Administer to the right eye 2 (two) times a day for 10 days, Disp: 3 5 g, Rfl: 1    Biotin 5000 MCG TABS, Take by mouth, Disp: , Rfl:     cyanocobalamin (VITAMIN B-12) 1,000 mcg tablet, Take by mouth daily, Disp: , Rfl:     Flaxseed, Linseed, (FLAX SEEDS PO), Take by mouth, Disp: , Rfl:     folic acid (FOLVITE) 1 mg tablet, Take by mouth, Disp: , Rfl:     niacin 500 mg tablet, Take 500 mg by mouth daily with breakfast, Disp: , Rfl:     predniSONE 10 mg tablet, 6 tabs Day 1, 5 tabs Day 2, 4 tabs Day 3, 3 tabs Day 4, 2 tabs Day 5, 1 tab Day 6  (Patient not taking: Reported on 8/30/2019), Disp: 21 tablet, Rfl: 0    Probiotic Product (PROBIOTIC-10 PO), Take by mouth, Disp: , Rfl:     Current Allergies     Allergies as of 08/30/2019 - Reviewed 08/30/2019   Allergen Reaction Noted    Amitriptyline  10/09/2013    Seasonal ic [cholestatin]  08/22/2018    Montelukast  07/15/2019            The following portions of the patient's history were reviewed and updated as appropriate: allergies, current medications, past family history, past medical history, past social history, past surgical history and problem list      Past Medical History:   Diagnosis Date    Abnormal electrocardiogram     Prolonged QT    Asthma     GERD (gastroesophageal reflux disease)     Shortness of breath     Last assessed 03/12/14    Swallowing difficulty        Past Surgical History:   Procedure Laterality Date    CATARACT EXTRACTION Bilateral     COLONOSCOPY      CYST REMOVAL      ovarian    ENDOMETRIAL BIOPSY      HEMORROIDECTOMY      [place] ESOPHAGOGASTRODUODENOSCOPY TRANSORAL DIAGNOSTIC N/A 10/24/2017    Procedure: ESOPHAGOGASTRODUODENOSCOPY (EGD); Surgeon: Norma Ortega MD;  [facility];   Service: Gastroenterology    SHOULDER SURGERY Bilateral     rotator cuff    TONSILLECTOMY      TOTAL ABDOMINAL HYSTERECTOMY W/ BILATERAL SALPINGOOPHORECTOMY         Family History   Problem Relation Age of Onset    Diabetes Mother     Hypertension Mother     Skin cancer Mother     Heart disease Family     Other Family         CVA    Cancer Family          Medications have been verified  Objective   /75   Pulse 77   Temp (!) 97 °F (36 1 °C)   Resp 18   SpO2 97%        Physical Exam     Physical Exam   Constitutional: She is oriented to person, place, and time  She appears well-developed and well-nourished  Eyes: Pupils are equal, round, and reactive to light  EOM are normal  Right eye exhibits no discharge  Left eye exhibits no discharge  Erythema and swelling of the right lower eyelid; superficial subconjunctival hemorrhage of the lateral aspect of the right eye   Neurological: She is alert and oriented to person, place, and time  Psychiatric: She has a normal mood and affect  Her behavior is normal    Nursing note and vitals reviewed

## 2019-09-05 ENCOUNTER — OFFICE VISIT (OUTPATIENT)
Dept: FAMILY MEDICINE CLINIC | Facility: CLINIC | Age: 69
End: 2019-09-05
Payer: MEDICARE

## 2019-09-05 VITALS
BODY MASS INDEX: 27.66 KG/M2 | TEMPERATURE: 98.3 F | DIASTOLIC BLOOD PRESSURE: 84 MMHG | OXYGEN SATURATION: 99 % | HEART RATE: 74 BPM | HEIGHT: 64 IN | SYSTOLIC BLOOD PRESSURE: 134 MMHG | WEIGHT: 162 LBS

## 2019-09-05 DIAGNOSIS — J30.89 ALLERGIC RHINITIS DUE TO OTHER ALLERGIC TRIGGER, UNSPECIFIED SEASONALITY: ICD-10-CM

## 2019-09-05 DIAGNOSIS — Z12.39 SCREENING FOR BREAST CANCER: ICD-10-CM

## 2019-09-05 DIAGNOSIS — J45.20 MILD INTERMITTENT ASTHMA, UNSPECIFIED WHETHER COMPLICATED: ICD-10-CM

## 2019-09-05 DIAGNOSIS — H00.012 HORDEOLUM EXTERNUM OF RIGHT LOWER EYELID: ICD-10-CM

## 2019-09-05 DIAGNOSIS — E78.2 MIXED HYPERLIPIDEMIA: Primary | ICD-10-CM

## 2019-09-05 DIAGNOSIS — D17.21 LIPOMA OF RIGHT UPPER EXTREMITY: ICD-10-CM

## 2019-09-05 PROCEDURE — 99214 OFFICE O/P EST MOD 30 MIN: CPT | Performed by: FAMILY MEDICINE

## 2019-09-05 RX ORDER — FLUTICASONE PROPIONATE 50 MCG
2 SPRAY, SUSPENSION (ML) NASAL AS NEEDED
Qty: 3 BOTTLE | Refills: 0 | Status: SHIPPED | OUTPATIENT
Start: 2019-09-05 | End: 2022-05-02 | Stop reason: SDUPTHER

## 2019-09-05 NOTE — PROGRESS NOTES
BMI Counseling: Body mass index is 28 25 kg/m²  Discussed the patient's BMI with her  The BMI is above average  BMI counseling and education was provided to the patient  Nutrition recommendations include reducing portion sizes, decreasing overall calorie intake, 3-5 servings of fruits/vegetables daily, reducing fast food intake, moderation in carbohydrate intake, increasing intake of lean protein and reducing intake of cholesterol  Exercise recommendations include exercising 3-5 times per week

## 2019-09-05 NOTE — PROGRESS NOTES
Assessment/Plan:     Diagnoses and all orders for this visit:    Mixed hyperlipidemia  -     Lipid Panel with Direct LDL reflex; Future  -     TSH, 3rd generation; Future  -     Comprehensive metabolic panel; Future    Lipoma of right upper extremity    Hordeolum externum of right lower eyelid    Screening for breast cancer  -     Mammo screening bilateral w 3d & cad; Future    Mild intermittent asthma, unspecified whether complicated    Allergic rhinitis due to other allergic trigger, unspecified seasonality  -     fluticasone (FLONASE) 50 mcg/act nasal spray; 2 sprays into each nostril as needed for rhinitis or allergies        Patient will continue efforts at fitness and dietary modification to improve BMI  Flu shot in the fall  Mammogram ordered for fall  Sees gyn every 2 years  She is status post hysterectomy BSO  BMI screening discussed colonoscopy up-to-date  Return 6 months  Subjective:   Chief Complaint   Patient presents with    Follow-up     regarding HL; review labs; also has c/o palpabke lump on R antecubital area      Patient ID: Francisco Garcia is a 77 y o  female  Pleasant 68-year-old female here for recheck and review of labs from February  Lipids are not ideal but patient has been intolerant to statins  She is trying lifestyle modification  She recently submitted her make goods and marv to the [facility] and won several prizes  She is very thrilled with that  She had some irritation in her eye and saw the eye doctor  She is on prednisone type topical   She also had some reactions to the Singulair and stop that  Had body aches and increase shortness of breath  She read the [facility]  Intermittently using her rescue inhaler  She needs refill on her Flonase  Also complains of feeling a little lump in the right antecubital area  It is not tender  It is mobile        The following portions of the patient's history were reviewed and updated as appropriate: allergies, current medications, past family history, past medical history, past social history, past surgical history and problem list     Review of Systems   Constitutional: Negative for fatigue and unexpected weight change  HENT: Dental problem: Up-to-date with dental     Eyes: Positive for redness  Respiratory: Negative for shortness of breath  Cardiovascular: Negative for chest pain and leg swelling  Gastrointestinal: Negative  Genitourinary: Negative  Musculoskeletal: Myalgias:  with Singulair  Neurological: Negative  Psychiatric/Behavioral: Negative  Objective:  Component      Latest Ref Rng & Units 2/22/2019   Sodium      136 - 145 mmol/L 139   Potassium      3 5 - 5 3 mmol/L 3 8   Chloride      100 - 108 mmol/L 103   CO2      21 - 32 mmol/L 29   Anion Gap      4 - 13 mmol/L 7   BUN      5 - 25 mg/dL 15   Creatinine      0 60 - 1 30 mg/dL 0 70   GLUCOSE FASTING      65 - 99 mg/dL 62 (L)   Calcium      8 3 - 10 1 mg/dL 8 9   AST      5 - 45 U/L 14   ALT      12 - 78 U/L 20   Alkaline Phosphatase      46 - 116 U/L 69   Total Protein      6 4 - 8 2 g/dL 7 1   Albumin      3 5 - 5 0 g/dL 3 6   TOTAL BILIRUBIN      0 20 - 1 00 mg/dL 0 62   eGFR      ml/min/1 73sq m 88   Cholesterol      50 - 200 mg/dL 233 (H)   Triglycerides      <=150 mg/dL 107   HDL      40 - 60 mg/dL 63 (H)   LDL Direct      0 - 100 mg/dL 149 (H)   TSH 3RD GENERATON      0 358 - 3 740 uIU/mL 2 330       /84   Pulse 74   Temp 98 3 °F (36 8 °C) (Temporal)   Ht 5' 3 5" (1 613 m)   Wt 73 5 kg (162 lb)   SpO2 99%   BMI 28 25 kg/m²          Physical Exam   Constitutional: She is oriented to person, place, and time  She appears well-developed and well-nourished  HENT:   Mouth/Throat: Oropharynx is clear and moist    Eyes: Pupils are equal, round, and reactive to light  Right eye exhibits hordeolum  Cardiovascular: Normal rate, regular rhythm, normal heart sounds and intact distal pulses     No carotid, abdominal and femoral Pulmonary/Chest: Effort normal and breath sounds normal    Abdominal: Soft  Bowel sounds are normal    Neurological: She is alert and oriented to person, place, and time  Skin:   Dime sized mobile lipoma right antecubital, non tender   Psychiatric: She has a normal mood and affect  Her behavior is normal  Judgment and thought content normal    Vitals reviewed

## 2019-09-10 ENCOUNTER — TELEPHONE (OUTPATIENT)
Dept: FAMILY MEDICINE CLINIC | Facility: CLINIC | Age: 69
End: 2019-09-10

## 2019-09-10 NOTE — TELEPHONE ENCOUNTER
----- Message from Sirisha King DO sent at 1/6/7720  9:58 AM EDT -----  Regarding: Labs before next visit  I forgot hand the patient a lab slip that she is to have done before her March visit  Could you please put them in the mail for her

## 2019-12-02 DIAGNOSIS — J45.20 MILD INTERMITTENT ASTHMA, UNSPECIFIED WHETHER COMPLICATED: ICD-10-CM

## 2019-12-02 NOTE — TELEPHONE ENCOUNTER
Pt states she needs this med sent to [facility] because it is cheaper  And needs 2 at a time with 1 R/F  Please authorize

## 2019-12-03 RX ORDER — LEVALBUTEROL TARTRATE 45 UG/1
1 AEROSOL, METERED ORAL EVERY 4 HOURS PRN
Qty: 2 INHALER | Refills: 1 | Status: SHIPPED | OUTPATIENT
Start: 2019-12-03 | End: 2020-03-30 | Stop reason: SDUPTHER

## 2019-12-09 ENCOUNTER — HOSPITAL ENCOUNTER (OUTPATIENT)
Dept: RADIOLOGY | Age: 69
Discharge: HOME | End: 2019-12-09
Payer: MEDICARE

## 2019-12-09 VITALS — HEIGHT: 63 IN | WEIGHT: 160 LBS | BODY MASS INDEX: 28.35 KG/M2

## 2019-12-09 DIAGNOSIS — Z12.39 SCREENING FOR BREAST CANCER: ICD-10-CM

## 2019-12-09 PROCEDURE — 77063 BREAST TOMOSYNTHESIS BI: CPT

## 2019-12-09 PROCEDURE — 77067 SCR MAMMO BI INCL CAD: CPT

## 2020-02-10 ENCOUNTER — OFFICE VISIT (OUTPATIENT)
Dept: FAMILY MEDICINE CLINIC | Facility: CLINIC | Age: 70
End: 2020-02-10
Payer: MEDICARE

## 2020-02-10 VITALS
BODY MASS INDEX: 29.52 KG/M2 | DIASTOLIC BLOOD PRESSURE: 78 MMHG | WEIGHT: 166.6 LBS | SYSTOLIC BLOOD PRESSURE: 144 MMHG | HEIGHT: 63 IN | RESPIRATION RATE: 16 BRPM | TEMPERATURE: 98.3 F | OXYGEN SATURATION: 99 % | HEART RATE: 82 BPM

## 2020-02-10 DIAGNOSIS — M25.562 ACUTE PAIN OF LEFT KNEE: Primary | ICD-10-CM

## 2020-02-10 PROCEDURE — 3077F SYST BP >= 140 MM HG: CPT | Performed by: FAMILY MEDICINE

## 2020-02-10 PROCEDURE — 3078F DIAST BP <80 MM HG: CPT | Performed by: FAMILY MEDICINE

## 2020-02-10 PROCEDURE — 1036F TOBACCO NON-USER: CPT | Performed by: FAMILY MEDICINE

## 2020-02-10 PROCEDURE — 99213 OFFICE O/P EST LOW 20 MIN: CPT | Performed by: FAMILY MEDICINE

## 2020-02-10 PROCEDURE — 1160F RVW MEDS BY RX/DR IN RCRD: CPT | Performed by: FAMILY MEDICINE

## 2020-02-10 PROCEDURE — 4040F PNEUMOC VAC/ADMIN/RCVD: CPT | Performed by: FAMILY MEDICINE

## 2020-02-10 NOTE — PROGRESS NOTES
Assessment/Plan:     Diagnoses and all orders for this visit:    Acute pain of left knee  -     XR knee 3 vw left non injury; Future        Patient works at [employer] will go for x-rays then  Or suggest per results possible PT on knee  For now continue topical treatment and anti inflammatory  Subjective:   Chief Complaint   Patient presents with    Knee Pain     Pt is here for increasing left knee pain       Patient ID: Hanna Abdullahi is a 68 y o  female  Patient is a pleasant 10-year-old female who is here to discuss increasing discomfort on left knee  She states that symptoms are for about a month  She is doing regular walking outdoors  She also has been on a treadmill and was increasing the inclined just a bit  This seemed to have flared  Then over this week she was on long ride to visit her daughter  When she got out of the car the knee pain was much worse  She has used ice and Advil and that has helped over the weekend  It has the feeling of giving way  Knee Pain    The incident occurred more than 1 week ago  The incident occurred at home  Injury mechanism: Overuse  The pain is present in the left knee  The quality of the pain is described as aching, burning and shooting  The pain is at a severity of 7/10  The pain is moderate  The pain has been fluctuating since onset  Associated symptoms include an inability to bear weight  Associated symptoms comments: Sensation of giving way  She has tried elevation, ice and NSAIDs for the symptoms  The treatment provided mild relief  Has had issue with the right knee at times  Currently is having PT on her neck and that has been helpful  Patient also has a history of bilateral rotator cuff and surgery      The following portions of the patient's history were reviewed and updated as appropriate: allergies, current medications, past family history, past medical history, past social history, past surgical history and problem list       Review of Systems      Objective:      /78   Pulse 82   Temp 98 3 °F (36 8 °C) (Temporal)   Resp 16   Ht 5' 3" (1 6 m)   Wt 75 6 kg (166 lb 9 6 oz)   SpO2 99%   BMI 29 51 kg/m²          Physical Exam

## 2020-02-11 ENCOUNTER — APPOINTMENT (OUTPATIENT)
Dept: RADIOLOGY | Age: 70
End: 2020-02-11
Payer: MEDICARE

## 2020-02-11 DIAGNOSIS — M25.562 ACUTE PAIN OF LEFT KNEE: ICD-10-CM

## 2020-02-11 PROCEDURE — 73562 X-RAY EXAM OF KNEE 3: CPT

## 2020-02-17 ENCOUNTER — TELEPHONE (OUTPATIENT)
Dept: FAMILY MEDICINE CLINIC | Facility: CLINIC | Age: 70
End: 2020-02-17

## 2020-02-17 NOTE — TELEPHONE ENCOUNTER
Spoke w/ pt and gave results  Pt would tosin to hold off on physical therapy and see how she feels   She will call if sx's persist

## 2020-02-17 NOTE — TELEPHONE ENCOUNTER
VIEWS:  XR KNEE 3 VW LEFT NON INJURY         FINDINGS:     There is no acute fracture or dislocation      There is no joint effusion      There is mild narrowing of the medial and anterior compartments with mild degenerative spurring      No lytic or blastic lesions are seen      Soft tissues are unremarkable      IMPRESSION:     No acute osseous abnormality  There is mild narrowing of the medial and anterior compartments with mild degenerative spurring      If patient's symptoms are persistent can see physical therapy I can write an order for her to have it done at [employer] where she works

## 2020-02-28 ENCOUNTER — APPOINTMENT (OUTPATIENT)
Dept: LAB | Age: 70
End: 2020-02-28
Payer: MEDICARE

## 2020-02-28 DIAGNOSIS — E78.2 MIXED HYPERLIPIDEMIA: ICD-10-CM

## 2020-02-28 LAB
ALBUMIN SERPL BCP-MCNC: 3.6 G/DL (ref 3.5–5)
ALP SERPL-CCNC: 58 U/L (ref 46–116)
ALT SERPL W P-5'-P-CCNC: 22 U/L (ref 12–78)
ANION GAP SERPL CALCULATED.3IONS-SCNC: 5 MMOL/L (ref 4–13)
AST SERPL W P-5'-P-CCNC: 14 U/L (ref 5–45)
BILIRUB SERPL-MCNC: 0.61 MG/DL (ref 0.2–1)
BUN SERPL-MCNC: 13 MG/DL (ref 5–25)
CALCIUM SERPL-MCNC: 9.2 MG/DL (ref 8.3–10.1)
CHLORIDE SERPL-SCNC: 109 MMOL/L (ref 100–108)
CHOLEST SERPL-MCNC: 242 MG/DL (ref 50–200)
CO2 SERPL-SCNC: 29 MMOL/L (ref 21–32)
CREAT SERPL-MCNC: 0.71 MG/DL (ref 0.6–1.3)
GFR SERPL CREATININE-BSD FRML MDRD: 86 ML/MIN/1.73SQ M
GLUCOSE P FAST SERPL-MCNC: 94 MG/DL (ref 65–99)
HDLC SERPL-MCNC: 70 MG/DL
LDLC SERPL CALC-MCNC: 151 MG/DL (ref 0–100)
POTASSIUM SERPL-SCNC: 4.2 MMOL/L (ref 3.5–5.3)
PROT SERPL-MCNC: 7.3 G/DL (ref 6.4–8.2)
SODIUM SERPL-SCNC: 143 MMOL/L (ref 136–145)
TRIGL SERPL-MCNC: 106 MG/DL
TSH SERPL DL<=0.05 MIU/L-ACNC: 2.04 UIU/ML (ref 0.36–3.74)

## 2020-02-28 PROCEDURE — 80061 LIPID PANEL: CPT

## 2020-02-28 PROCEDURE — 80053 COMPREHEN METABOLIC PANEL: CPT

## 2020-02-28 PROCEDURE — 36415 COLL VENOUS BLD VENIPUNCTURE: CPT

## 2020-02-28 PROCEDURE — 84443 ASSAY THYROID STIM HORMONE: CPT

## 2020-03-05 ENCOUNTER — OFFICE VISIT (OUTPATIENT)
Dept: FAMILY MEDICINE CLINIC | Facility: CLINIC | Age: 70
End: 2020-03-05
Payer: MEDICARE

## 2020-03-05 VITALS
HEART RATE: 72 BPM | TEMPERATURE: 97.8 F | BODY MASS INDEX: 28.92 KG/M2 | WEIGHT: 163.2 LBS | SYSTOLIC BLOOD PRESSURE: 124 MMHG | OXYGEN SATURATION: 98 % | HEIGHT: 63 IN | DIASTOLIC BLOOD PRESSURE: 66 MMHG

## 2020-03-05 DIAGNOSIS — M25.562 ACUTE PAIN OF LEFT KNEE: ICD-10-CM

## 2020-03-05 DIAGNOSIS — Z00.00 MEDICARE ANNUAL WELLNESS VISIT, SUBSEQUENT: Primary | ICD-10-CM

## 2020-03-05 PROCEDURE — 1125F AMNT PAIN NOTED PAIN PRSNT: CPT | Performed by: FAMILY MEDICINE

## 2020-03-05 PROCEDURE — 1036F TOBACCO NON-USER: CPT | Performed by: FAMILY MEDICINE

## 2020-03-05 PROCEDURE — G0438 PPPS, INITIAL VISIT: HCPCS | Performed by: FAMILY MEDICINE

## 2020-03-05 PROCEDURE — 3074F SYST BP LT 130 MM HG: CPT | Performed by: FAMILY MEDICINE

## 2020-03-05 PROCEDURE — 3008F BODY MASS INDEX DOCD: CPT | Performed by: FAMILY MEDICINE

## 2020-03-05 PROCEDURE — 1160F RVW MEDS BY RX/DR IN RCRD: CPT | Performed by: FAMILY MEDICINE

## 2020-03-05 PROCEDURE — 1170F FXNL STATUS ASSESSED: CPT | Performed by: FAMILY MEDICINE

## 2020-03-05 PROCEDURE — 1123F ACP DISCUSS/DSCN MKR DOCD: CPT | Performed by: FAMILY MEDICINE

## 2020-03-05 PROCEDURE — 3078F DIAST BP <80 MM HG: CPT | Performed by: FAMILY MEDICINE

## 2020-03-05 PROCEDURE — 4040F PNEUMOC VAC/ADMIN/RCVD: CPT | Performed by: FAMILY MEDICINE

## 2020-03-05 NOTE — PATIENT INSTRUCTIONS
Medicare Preventive Visit Patient Instructions  Thank you for completing your Welcome to Medicare Visit or Medicare Annual Wellness Visit today  Your next wellness visit will be due in one year (3/5/2021)  The screening/preventive services that you may require over the next 5-10 years are detailed below  Some tests may not apply to you based off risk factors and/or age  Screening tests ordered at today's visit but not completed yet may show as past due  Also, please note that scanned in results may not display below  Preventive Screenings:  Service Recommendations Previous Testing/Comments   Colorectal Cancer Screening  * Colonoscopy    * Fecal Occult Blood Test (FOBT)/Fecal Immunochemical Test (FIT)  * Fecal DNA/Cologuard Test  * Flexible Sigmoidoscopy Age: 54-65 years old   Colonoscopy: every 10 years (may be performed more frequently if at higher risk)  OR  FOBT/FIT: every 1 year  OR  Cologuard: every 3 years  OR  Sigmoidoscopy: every 5 years  Screening may be recommended earlier than age 48 if at higher risk for colorectal cancer  Also, an individualized decision between you and your healthcare provider will decide whether screening between the ages of 74-80 would be appropriate  Colonoscopy: 10/13/2015  FOBT/FIT: Not on file  Cologuard: Not on file  Sigmoidoscopy: Not on file    Screening Current     Breast Cancer Screening Age: 36 years old  Frequency: every 1-2 years  Not required if history of left and right mastectomy Mammogram: 12/09/2019    Screening Current   Cervical Cancer Screening Between the ages of 21-29, pap smear recommended once every 3 years  Between the ages of 33-67, can perform pap smear with HPV co-testing every 5 years     Recommendations may differ for women with a history of total hysterectomy, cervical cancer, or abnormal pap smears in past  Pap Smear: 09/06/2018    Screening Not Indicated   Hepatitis C Screening Once for adults born between 1945 and 1965  More frequently in patients at high risk for Hepatitis C Hep C Antibody: Not on file       Diabetes Screening 1-2 times per year if you're at risk for diabetes or have pre-diabetes Fasting glucose: 94 mg/dL   A1C: 5 4 %    Screening Current   Cholesterol Screening Once every 5 years if you don't have a lipid disorder  May order more often based on risk factors  Lipid panel: 02/28/2020    Screening Not Indicated  History Lipid Disorder     Other Preventive Screenings Covered by Medicare:  1  Abdominal Aortic Aneurysm (AAA) Screening: covered once if your at risk  You're considered to be at risk if you have a family history of AAA  2  Lung Cancer Screening: covers low dose CT scan once per year if you meet all of the following conditions: (1) Age 50-69; (2) No signs or symptoms of lung cancer; (3) Current smoker or have quit smoking within the last 15 years; (4) You have a tobacco smoking history of at least 30 pack years (packs per day multiplied by number of years you smoked); (5) You get a written order from a healthcare provider  3  Glaucoma Screening: covered annually if you're considered high risk: (1) You have diabetes OR (2) Family history of glaucoma OR (3)  aged 48 and older OR (3)  American aged 72 and older  3  Osteoporosis Screening: covered every 2 years if you meet one of the following conditions: (1) You're estrogen deficient and at risk for osteoporosis based off medical history and other findings; (2) Have a vertebral abnormality; (3) On glucocorticoid therapy for more than 3 months; (4) Have primary hyperparathyroidism; (5) On osteoporosis medications and need to assess response to drug therapy  · Last bone density test (DXA Scan): 11/02/2018  5  HIV Screening: covered annually if you're between the age of 12-76  Also covered annually if you are younger than 13 and older than 72 with risk factors for HIV infection   For pregnant patients, it is covered up to 3 times per pregnancy  Immunizations:  Immunization Recommendations   Influenza Vaccine Annual influenza vaccination during flu season is recommended for all persons aged >= 6 months who do not have contraindications   Pneumococcal Vaccine (Prevnar and Pneumovax)  * Prevnar = PCV13  * Pneumovax = PPSV23   Adults 25-60 years old: 1-3 doses may be recommended based on certain risk factors  Adults 72 years old: Prevnar (PCV13) vaccine recommended followed by Pneumovax (PPSV23) vaccine  If already received PPSV23 since turning 65, then PCV13 recommended at least one year after PPSV23 dose  Hepatitis B Vaccine 3 dose series if at intermediate or high risk (ex: diabetes, end stage renal disease, liver disease)   Tetanus (Td) Vaccine - COST NOT COVERED BY MEDICARE PART B Following completion of primary series, a booster dose should be given every 10 years to maintain immunity against tetanus  Td may also be given as tetanus wound prophylaxis  Tdap Vaccine - COST NOT COVERED BY MEDICARE PART B Recommended at least once for all adults  For pregnant patients, recommended with each pregnancy  Shingles Vaccine (Shingrix) - COST NOT COVERED BY MEDICARE PART B  2 shot series recommended in those aged 48 and above     Health Maintenance Due:      Topic Date Due    Hepatitis C Screening  03/05/2021 (Originally 1950)    MAMMOGRAM  12/09/2020    CRC Screening: Colonoscopy  10/13/2025     Immunizations Due:  There are no preventive care reminders to display for this patient  Advance Directives   What are advance directives? Advance directives are legal documents that state your wishes and plans for medical care  These plans are made ahead of time in case you lose your ability to make decisions for yourself  Advance directives can apply to any medical decision, such as the treatments you want, and if you want to donate organs  What are the types of advance directives?   There are many types of advance directives, and each state has rules about how to use them  You may choose a combination of any of the following:  · Living will: This is a written record of the treatment you want  You can also choose which treatments you do not want, which to limit, and which to stop at a certain time  This includes surgery, medicine, IV fluid, and tube feedings  · Durable power of  for healthcare [facility]): This is a written record that states who you want to make healthcare choices for you when you are unable to make them for yourself  This person, called a proxy, is usually a family member or a friend  You may choose more than 1 proxy  · Do not resuscitate (DNR) order:  A DNR order is used in case your heart stops beating or you stop breathing  It is a request not to have certain forms of treatment, such as CPR  A DNR order may be included in other types of advance directives  · Medical directive: This covers the care that you want if you are in a coma, near death, or unable to make decisions for yourself  You can list the treatments you want for each condition  Treatment may include pain medicine, surgery, blood transfusions, dialysis, IV or tube feedings, and a ventilator (breathing machine)  · Values history: This document has questions about your views, beliefs, and how you feel and think about life  This information can help others choose the care that you would choose  Why are advance directives important? An advance directive helps you control your care  Although spoken wishes may be used, it is better to have your wishes written down  Spoken wishes can be misunderstood, or not followed  Treatments may be given even if you do not want them  An advance directive may make it easier for your family to make difficult choices about your care     Weight Management   Why it is important to manage your weight:  Being overweight increases your risk of health conditions such as heart disease, high blood pressure, type 2 diabetes, and certain types of cancer  It can also increase your risk for osteoarthritis, sleep apnea, and other respiratory problems  Aim for a slow, steady weight loss  Even a small amount of weight loss can lower your risk of health problems  How to lose weight safely:  A safe and healthy way to lose weight is to eat fewer calories and get regular exercise  You can lose up about 1 pound a week by decreasing the number of calories you eat by 500 calories each day  Healthy meal plan for weight management:  A healthy meal plan includes a variety of foods, contains fewer calories, and helps you stay healthy  A healthy meal plan includes the following:  · Eat whole-grain foods more often  A healthy meal plan should contain fiber  Fiber is the part of grains, fruits, and vegetables that is not broken down by your body  Whole-grain foods are healthy and provide extra fiber in your diet  Some examples of whole-grain foods are whole-wheat breads and pastas, oatmeal, brown rice, and bulgur  · Eat a variety of vegetables every day  Include dark, leafy greens such as spinach, kale, leena greens, and mustard greens  Eat yellow and orange vegetables such as carrots, sweet potatoes, and winter squash  · Eat a variety of fruits every day  Choose fresh or canned fruit (canned in its own juice or light syrup) instead of juice  Fruit juice has very little or no fiber  · Eat low-fat dairy foods  Drink fat-free (skim) milk or 1% milk  Eat fat-free yogurt and low-fat cottage cheese  Try low-fat cheeses such as mozzarella and other reduced-fat cheeses  · Choose meat and other protein foods that are low in fat  Choose beans or other legumes such as split peas or lentils  Choose fish, skinless poultry (chicken or turkey), or lean cuts of red meat (beef or pork)  Before you cook meat or poultry, cut off any visible fat  · Use less fat and oil  Try baking foods instead of frying them   Add less fat, such as margarine, sour cream, regular salad dressing and mayonnaise to foods  Eat fewer high-fat foods  Some examples of high-fat foods include french fries, doughnuts, ice cream, and cakes  · Eat fewer sweets  Limit foods and drinks that are high in sugar  This includes candy, cookies, regular soda, and sweetened drinks  Exercise:  Exercise at least 30 minutes per day on most days of the week  Some examples of exercise include walking, biking, dancing, and swimming  You can also fit in more physical activity by taking the stairs instead of the elevator or parking farther away from stores  Ask your healthcare provider about the best exercise plan for you  © Copyright [employer] 2018 Information is for End User's use only and may not be sold, redistributed or otherwise used for commercial purposes   All illustrations and images included in CareNotes® are the copyrighted property of A D A M , Inc  or [address] [place] [address]

## 2020-03-05 NOTE — PROGRESS NOTES
Assessment and Plan:   Jessica was seen today for medicare wellness visit  Diagnoses and all orders for this visit:    Medicare annual wellness visit, subsequent    Acute pain of left knee  -     Ambulatory referral to Physical Therapy; Future      Problem List Items Addressed This Visit     None      Visit Diagnoses     Medicare annual wellness visit, subsequent    -  Primary    Acute pain of left knee        Relevant Orders    Ambulatory referral to Physical Therapy      64-year-old female who is here for wellness exam   Her knee is better but we are so going to encourage physical therapy to improve the muscular structures of the joint  Her neck is improved patient's lab work was reviewed and despite the fact that the total cholesterol and the LDL are up a little bit her HDL is excellent and her triglycerides are low  Her comprehensive metabolic problem panel is normal and her thyroid is normal   She is up-to-date on all other preventative measures like mammogram and colonoscopy  Preventive health issues were discussed with patient, and age appropriate screening tests were ordered as noted in patient's After Visit Summary  Personalized health advice and appropriate referrals for health education or preventive services given if needed, as noted in patient's After Visit Summary       History of Present Illness:   Review of Systems - History obtained from the patient  Psychological ROS: negative  Breast ROS: negative for breast lumps  Respiratory ROS: no cough, shortness of breath, or wheezing  Cardiovascular ROS: no chest pain or dyspnea on exertion  Gastrointestinal ROS: no abdominal pain, change in bowel habits, or black or bloody stools  Musculoskeletal ROS: positive for - joint stiffness  Patient presents for Medicare Annual Wellness visit    Patient Care Team:  Esau Cespedes DO as PCP - General  Angelique Herrera MD     Problem List:     Patient Active Problem List   Diagnosis    Allergic rhinitis    Arthritis    Asthma    Gastroesophageal reflux disease without esophagitis    Mixed hyperlipidemia    Osteoarthritis    Osteopenia    Rotator cuff syndrome of right shoulder    Vitamin B12 deficiency    Vitamin D deficiency      Past Medical and Surgical History:     Past Medical History:   Diagnosis Date    Abnormal electrocardiogram     Prolonged QT    Asthma     GERD (gastroesophageal reflux disease)     Shortness of breath     Last assessed 03/12/14    Swallowing difficulty      Past Surgical History:   Procedure Laterality Date    CATARACT EXTRACTION Bilateral     COLONOSCOPY      CYST REMOVAL      ovarian    ENDOMETRIAL BIOPSY      HEMORROIDECTOMY      [place] ESOPHAGOGASTRODUODENOSCOPY TRANSORAL DIAGNOSTIC N/A 10/24/2017    Procedure: ESOPHAGOGASTRODUODENOSCOPY (EGD); Surgeon: Volodymyr Barth MD;  [facility];   Service: Gastroenterology    SHOULDER SURGERY Bilateral     rotator cuff    TONSILLECTOMY      TOTAL ABDOMINAL HYSTERECTOMY W/ BILATERAL SALPINGOOPHORECTOMY        Family History:     Family History   Problem Relation Age of Onset    Diabetes Mother     Hypertension Mother     Skin cancer Mother    [place] Heart disease Family     Other Family         CVA    Cancer Family     No Known Problems Father     Skin cancer Sister     No Known Problems Daughter     No Known Problems Maternal Grandmother     No Known Problems Maternal Grandfather     No Known Problems Paternal Grandmother     No Known Problems Paternal Grandfather     Prostate cancer Brother 77    No Known Problems Sister     No Known Problems Daughter     No Known Problems Maternal Aunt     No Known Problems Maternal Aunt     No Known Problems Maternal Aunt     No Known Problems Maternal Aunt     No Known Problems Maternal Aunt     No Known Problems Paternal Aunt       Social History:        Social History     Socioeconomic History    Marital status:      Spouse name: None    Number of children: None    Years of education: None    Highest education level: None   Occupational History    None   Social Needs    Financial resource strain: None    Food insecurity:     Worry: None     Inability: None    Transportation needs:     Medical: None     Non-medical: None   Tobacco Use    Smoking status: Never Smoker    Smokeless tobacco: Never Used   Substance and Sexual Activity    Alcohol use: Yes     Comment: occasionally    Drug use: No    Sexual activity: None   Lifestyle    Physical activity:     Days per week: None     Minutes per session: None    Stress: None   Relationships    Social connections:     Talks on phone: None     Gets together: None     Attends [employer] service: None     Active member of club or organization: None     Attends meetings of clubs or organizations: None     Relationship status: None    Intimate partner violence:     Fear of current or ex partner: None     Emotionally abused: None     Physically abused: None     Forced sexual activity: None   Other Topics Concern    None   Social History Narrative    None      Medications and Allergies:     Current Outpatient Medications   Medication Sig Dispense Refill    B Complex-Folic Acid (SUPER B COMPLEX MAXI) TABS Take by mouth      bzmk-zxzvwju-nho-hydrocort (CORTISPORIN) 1 % ointment Administer to the right eye 2 (two) times a day for 10 days 3 5 g 1    calcium carbonate 1250 MG capsule Take 1,250 mg by mouth 2 (two) times a day with meals      Cholecalciferol (VITAMIN D3) 5000 units TABS Take by mouth      levalbuterol (XOPENEX HFA) 45 mcg/act inhaler Inhale 1 puff every 4 (four) hours as needed for wheezing BRAND XOPONEX 2 Inhaler 1    Biotin 5000 MCG TABS Take by mouth      cyanocobalamin (VITAMIN B-12) 1,000 mcg tablet Take by mouth daily      Flaxseed, Linseed, (FLAX SEEDS PO) Take by mouth      fluticasone (FLONASE) 50 mcg/act nasal spray 2 sprays into each nostril as needed for rhinitis or allergies (Patient not taking: Reported on 3/5/2020) 3 Bottle 0    folic acid (FOLVITE) 1 mg tablet Take by mouth      niacin 500 mg tablet Take 500 mg by mouth daily with breakfast      Probiotic Product (PROBIOTIC-10 PO) Take by mouth       No current facility-administered medications for this visit  Allergies   Allergen Reactions    Amitriptyline     Seasonal Ic [Cholestatin]     Montelukast       Immunizations:     Immunization History   Administered Date(s) Administered    H1N1, All Formulations 11/04/2009    Influenza Split High Dose Preservative Free IM 10/31/2016, 09/29/2017    Influenza TIV (IM) 10/01/2015    Influenza Whole 10/19/2019    Influenza, high dose seasonal 0 5 mL 10/24/2018    Pneumococcal Conjugate 13-Valent 02/21/2018    Pneumococcal Polysaccharide PPV23 01/18/2017    Tdap 02/28/2019    Zoster 08/30/2016      Health Maintenance:         Topic Date Due    Hepatitis C Screening  03/05/2021 (Originally 1950)    MAMMOGRAM  12/09/2020    CRC Screening: Colonoscopy  10/13/2025     There are no preventive care reminders to display for this patient  Medicare Health Risk Assessment:     /66   Pulse 72   Temp 97 8 °F (36 6 °C)   Ht 5' 3" (1 6 m)   Wt 74 kg (163 lb 3 2 oz)   SpO2 98%   BMI 28 91 kg/m²      Jessica is here for her Subsequent Wellness visit  Last Medicare Wellness visit information reviewed, patient interviewed and updates made to the record today  Health Risk Assessment:   Patient rates overall health as excellent  Patient feels that their physical health rating is same  Eyesight was rated as same  Hearing was rated as same  Patient feels that their emotional and mental health rating is same  Pain experienced in the last 7 days has been some  Patient's pain rating has been 1/10  Patient states that she has experienced no weight loss or gain in last 6 months  Depression Screening:   PHQ-2 Score: 0      Fall Risk Screening:    In the past year, patient has experienced: no history of falling in past year      Urinary Incontinence Screening:   Patient has not leaked urine accidently in the last six months  Home Safety:  Patient does not have trouble with stairs inside or outside of their home  Patient has working smoke alarms and has working carbon monoxide detector  Home safety hazards include: none  Nutrition:   Current diet is Regular and Limited junk food  Medications:   Patient is currently taking over-the-counter supplements  OTC medications include: calcium, vitamin d, b complex  Patient is able to manage medications  Activities of Daily Living (ADLs)/Instrumental Activities of Daily Living (IADLs):   Walk and transfer into and out of bed and chair?: Yes  Dress and groom yourself?: Yes    Bathe or shower yourself?: Yes    Feed yourself? Yes  Do your laundry/housekeeping?: Yes  Manage your money, pay your bills and track your expenses?: Yes  Make your own meals?: Yes    Do your own shopping?: Yes    Previous Hospitalizations:   Any hospitalizations or ED visits within the last 12 months?: No      Advance Care Planning:   Living will: Yes    Advanced directive: Yes    Five wishes given: Yes    End of Life Decisions reviewed with patient: Yes      Cognitive Screening:   Provider or family/friend/caregiver concerned regarding cognition?: No    PREVENTIVE SCREENINGS      Cardiovascular Screening:    General: Screening Not Indicated and History Lipid Disorder      Diabetes Screening:     General: Screening Current      Colorectal Cancer Screening:     General: Screening Current      Breast Cancer Screening:     General: Screening Current      Cervical Cancer Screening:    General: Screening Not Indicated      Abdominal Aortic Aneurysm (AAA) Screening:        General: Screening Not Indicated      Lung Cancer Screening:     General: Screening Not Indicated      Hepatitis C Screening:    General: Screening Current    vbsma  Physical Exam Constitutional: She appears well-developed and well-nourished  68year old appears younger age    HENT:   Mouth/Throat: Oropharynx is clear and moist    Eyes: Pupils are equal, round, and reactive to light  Cardiovascular: Normal rate, regular rhythm, normal heart sounds and intact distal pulses  Pulmonary/Chest: Effort normal and breath sounds normal    Abdominal: Soft  Bowel sounds are normal    Musculoskeletal: She exhibits no edema  Psychiatric: She has a normal mood and affect  Her behavior is normal  Judgment and thought content normal    Vitals reviewed      Phuc Stephenson, DO

## 2020-03-30 DIAGNOSIS — J45.20 MILD INTERMITTENT ASTHMA, UNSPECIFIED WHETHER COMPLICATED: ICD-10-CM

## 2020-03-30 RX ORDER — LEVALBUTEROL TARTRATE 45 UG/1
1 AEROSOL, METERED ORAL EVERY 4 HOURS PRN
Qty: 2 INHALER | Refills: 1 | Status: SHIPPED | OUTPATIENT
Start: 2020-03-30 | End: 2020-09-11 | Stop reason: SDUPTHER

## 2020-09-11 ENCOUNTER — OFFICE VISIT (OUTPATIENT)
Dept: FAMILY MEDICINE CLINIC | Facility: CLINIC | Age: 70
End: 2020-09-11
Payer: MEDICARE

## 2020-09-11 VITALS
BODY MASS INDEX: 28.2 KG/M2 | OXYGEN SATURATION: 98 % | HEART RATE: 68 BPM | WEIGHT: 165.2 LBS | TEMPERATURE: 97.2 F | HEIGHT: 64 IN | DIASTOLIC BLOOD PRESSURE: 84 MMHG | SYSTOLIC BLOOD PRESSURE: 150 MMHG

## 2020-09-11 DIAGNOSIS — M65.321 TRIGGER INDEX FINGER OF RIGHT HAND: Primary | ICD-10-CM

## 2020-09-11 DIAGNOSIS — M17.12 PRIMARY OSTEOARTHRITIS OF LEFT KNEE: ICD-10-CM

## 2020-09-11 DIAGNOSIS — Z12.31 BREAST CANCER SCREENING BY MAMMOGRAM: Primary | ICD-10-CM

## 2020-09-11 DIAGNOSIS — E78.2 MIXED HYPERLIPIDEMIA: ICD-10-CM

## 2020-09-11 DIAGNOSIS — J45.20 MILD INTERMITTENT ASTHMA, UNSPECIFIED WHETHER COMPLICATED: ICD-10-CM

## 2020-09-11 PROCEDURE — 99214 OFFICE O/P EST MOD 30 MIN: CPT | Performed by: FAMILY MEDICINE

## 2020-09-11 RX ORDER — LEVALBUTEROL TARTRATE 45 UG/1
1 AEROSOL, METERED ORAL EVERY 4 HOURS PRN
Qty: 3 INHALER | Refills: 1 | Status: SHIPPED | OUTPATIENT
Start: 2020-09-11 | End: 2021-08-24 | Stop reason: SDUPTHER

## 2020-09-11 RX ORDER — MULTIVIT WITH MINERALS/LUTEIN
1000 TABLET ORAL DAILY
COMMUNITY

## 2020-09-11 NOTE — PROGRESS NOTES
Assessment/Plan     Diagnoses and all orders for this visit:    Trigger index finger of right hand  -     Ambulatory referral to Hand Surgery; Future    Mild intermittent asthma, unspecified whether complicated  -     levalbuterol (Xopenex HFA) 45 mcg/act inhaler; Inhale 1 puff every 4 (four) hours as needed for wheezing    Primary osteoarthritis of left knee  -     Ambulatory referral to Orthopedic Surgery; Future    Mixed hyperlipidemia  -     Lipid Panel with Direct LDL reflex; Future  -     Comprehensive metabolic panel; Future  -     T3, free; Future  -     T4, free; Future  -     TSH, 3rd generation; Future    Other orders  -     Ascorbic Acid (vitamin C) 1000 MG tablet; Take 1,000 mg by mouth daily          Lab work will be scheduled for February 2021  Mammogram is ordered  Flu shot in October  Continue efforts at fitness and dietary compliance  Orthopedic referral if right index finger symptoms worsen  Subjective:   Chief Complaint   Patient presents with    Follow-up     Pt wants refill on Xopenex but generic, pain in   right index finger joints 5/10      Patient ID: Goldie Goldsmith is a 68 y o  female  Pleasant 66-year-old female here for follow-up  She has not been working her part-time job at [address] [place], [place] [address] due to [employer]  Asthma  symptoms are stable  Patient continues with symptomatic issues with her left knee  Has tried to do conservative treatment with home PT etc     The following portions of the patient's history were reviewed and updated as appropriate: allergies, current medications, past family history, past medical history, past social history, past surgical history and problem list       Review of Systems   Constitutional: Negative for fatigue  Respiratory: Negative for cough and shortness of breath  Cardiovascular: Negative for chest pain  Musculoskeletal: Positive for arthralgias ( as noted)  Psychiatric/Behavioral: Negative          Minor things: bump/lump right antecubital, no pain and also right lateral thigh  A lot of pain In the right thumb and index finger with swelling  Patient cannot fully make a   Left knee: did not get to PT (covid)--by night time "stiff"  Objective:      /84   Pulse 68   Temp (!) 97 2 °F (36 2 °C) (Temporal)   Ht 5' 3 5" (1 613 m)   Wt 74 9 kg (165 lb 3 2 oz)   SpO2 98%   BMI 28 80 kg/m²          Physical Exam  Constitutional:       General: She is not in acute distress  Appearance: Normal appearance  She is well-developed  Comments: 75-year-old female appears younger than her stated age with a BMI of 28%  HENT:      Head: Normocephalic  Eyes:      Pupils: Pupils are equal, round, and reactive to light  Neck:      Musculoskeletal: Normal range of motion and neck supple  Vascular: No carotid bruit  Cardiovascular:      Rate and Rhythm: Normal rate and regular rhythm  Heart sounds: No murmur  Pulmonary:      Effort: Pulmonary effort is normal       Breath sounds: Normal breath sounds  Abdominal:      General: Bowel sounds are normal       Palpations: Abdomen is soft  There is no mass  Tenderness: There is no abdominal tenderness  Musculoskeletal:      Comments: Patient with slight tenderness at metacarpal carpal and PIP without clicking or trigger   Skin:     General: Skin is warm and dry  Neurological:      Mental Status: She is alert and oriented to person, place, and time  Deep Tendon Reflexes: Reflexes are normal and symmetric  Psychiatric:         Mood and Affect: Mood normal          Behavior: Behavior normal          Thought Content:  Thought content normal          Judgment: Judgment normal

## 2020-10-05 ENCOUNTER — EVALUATION (OUTPATIENT)
Dept: PHYSICAL THERAPY | Facility: CLINIC | Age: 70
End: 2020-10-05
Payer: MEDICARE

## 2020-10-05 DIAGNOSIS — G89.29 CHRONIC PAIN OF LEFT KNEE: Primary | ICD-10-CM

## 2020-10-05 DIAGNOSIS — M25.562 CHRONIC PAIN OF LEFT KNEE: Primary | ICD-10-CM

## 2020-10-05 PROCEDURE — 97162 PT EVAL MOD COMPLEX 30 MIN: CPT | Performed by: PHYSICAL THERAPIST

## 2020-10-05 PROCEDURE — 97110 THERAPEUTIC EXERCISES: CPT | Performed by: PHYSICAL THERAPIST

## 2020-10-08 ENCOUNTER — OFFICE VISIT (OUTPATIENT)
Dept: PHYSICAL THERAPY | Facility: CLINIC | Age: 70
End: 2020-10-08
Payer: MEDICARE

## 2020-10-08 DIAGNOSIS — G89.29 CHRONIC PAIN OF LEFT KNEE: Primary | ICD-10-CM

## 2020-10-08 DIAGNOSIS — M25.562 CHRONIC PAIN OF LEFT KNEE: Primary | ICD-10-CM

## 2020-10-08 PROCEDURE — 97112 NEUROMUSCULAR REEDUCATION: CPT | Performed by: PHYSICAL THERAPIST

## 2020-10-08 PROCEDURE — 97140 MANUAL THERAPY 1/> REGIONS: CPT | Performed by: PHYSICAL THERAPIST

## 2020-10-08 PROCEDURE — 97110 THERAPEUTIC EXERCISES: CPT | Performed by: PHYSICAL THERAPIST

## 2020-10-13 ENCOUNTER — OFFICE VISIT (OUTPATIENT)
Dept: PHYSICAL THERAPY | Facility: CLINIC | Age: 70
End: 2020-10-13
Payer: MEDICARE

## 2020-10-13 ENCOUNTER — IMMUNIZATIONS (OUTPATIENT)
Dept: FAMILY MEDICINE CLINIC | Facility: CLINIC | Age: 70
End: 2020-10-13
Payer: MEDICARE

## 2020-10-13 DIAGNOSIS — G89.29 CHRONIC PAIN OF LEFT KNEE: Primary | ICD-10-CM

## 2020-10-13 DIAGNOSIS — M25.562 CHRONIC PAIN OF LEFT KNEE: Primary | ICD-10-CM

## 2020-10-13 DIAGNOSIS — Z23 ENCOUNTER FOR IMMUNIZATION: ICD-10-CM

## 2020-10-13 PROCEDURE — 97140 MANUAL THERAPY 1/> REGIONS: CPT | Performed by: PHYSICAL THERAPIST

## 2020-10-13 PROCEDURE — 97110 THERAPEUTIC EXERCISES: CPT | Performed by: PHYSICAL THERAPIST

## 2020-10-13 PROCEDURE — G0008 ADMIN INFLUENZA VIRUS VAC: HCPCS

## 2020-10-13 PROCEDURE — 90662 IIV NO PRSV INCREASED AG IM: CPT

## 2020-12-10 ENCOUNTER — HOSPITAL ENCOUNTER (OUTPATIENT)
Dept: RADIOLOGY | Age: 70
Discharge: HOME | End: 2020-12-10
Payer: MEDICARE

## 2020-12-10 VITALS — HEIGHT: 63 IN | WEIGHT: 160 LBS | BODY MASS INDEX: 28.35 KG/M2

## 2020-12-10 DIAGNOSIS — Z12.31 BREAST CANCER SCREENING BY MAMMOGRAM: ICD-10-CM

## 2020-12-10 PROCEDURE — 77063 BREAST TOMOSYNTHESIS BI: CPT

## 2020-12-10 PROCEDURE — 77067 SCR MAMMO BI INCL CAD: CPT

## 2021-01-27 ENCOUNTER — TRANSCRIBE ORDERS (OUTPATIENT)
Dept: ADMINISTRATIVE | Facility: HOSPITAL | Age: 71
End: 2021-01-27

## 2021-01-27 DIAGNOSIS — M25.562 CHRONIC PAIN OF LEFT KNEE: Primary | ICD-10-CM

## 2021-01-27 DIAGNOSIS — G89.29 CHRONIC PAIN OF LEFT KNEE: Primary | ICD-10-CM

## 2021-01-31 ENCOUNTER — HOSPITAL ENCOUNTER (OUTPATIENT)
Dept: RADIOLOGY | Age: 71
Discharge: HOME | End: 2021-01-31
Payer: MEDICARE

## 2021-01-31 DIAGNOSIS — G89.29 CHRONIC PAIN OF LEFT KNEE: ICD-10-CM

## 2021-01-31 DIAGNOSIS — M25.562 CHRONIC PAIN OF LEFT KNEE: ICD-10-CM

## 2021-01-31 PROCEDURE — 73721 MRI JNT OF LWR EXTRE W/O DYE: CPT

## 2021-01-31 PROCEDURE — G1004 CDSM NDSC: HCPCS

## 2021-02-15 ENCOUNTER — OFFICE VISIT (OUTPATIENT)
Dept: FAMILY MEDICINE CLINIC | Facility: CLINIC | Age: 71
End: 2021-02-15
Payer: MEDICARE

## 2021-02-15 VITALS
SYSTOLIC BLOOD PRESSURE: 134 MMHG | BODY MASS INDEX: 29.27 KG/M2 | OXYGEN SATURATION: 98 % | WEIGHT: 165.2 LBS | HEART RATE: 73 BPM | HEIGHT: 63 IN | DIASTOLIC BLOOD PRESSURE: 82 MMHG | TEMPERATURE: 96.4 F | RESPIRATION RATE: 15 BRPM

## 2021-02-15 DIAGNOSIS — J30.89 ALLERGIC RHINITIS DUE TO OTHER ALLERGIC TRIGGER, UNSPECIFIED SEASONALITY: ICD-10-CM

## 2021-02-15 DIAGNOSIS — J45.20 MILD INTERMITTENT ASTHMA, UNSPECIFIED WHETHER COMPLICATED: ICD-10-CM

## 2021-02-15 DIAGNOSIS — S83.242D ACUTE MEDIAL MENISCUS TEAR OF LEFT KNEE, SUBSEQUENT ENCOUNTER: ICD-10-CM

## 2021-02-15 DIAGNOSIS — M85.80 OSTEOPENIA, UNSPECIFIED LOCATION: ICD-10-CM

## 2021-02-15 DIAGNOSIS — E53.8 VITAMIN B12 DEFICIENCY: ICD-10-CM

## 2021-02-15 DIAGNOSIS — K21.9 GASTROESOPHAGEAL REFLUX DISEASE WITHOUT ESOPHAGITIS: ICD-10-CM

## 2021-02-15 DIAGNOSIS — E78.2 MIXED HYPERLIPIDEMIA: ICD-10-CM

## 2021-02-15 DIAGNOSIS — E55.9 VITAMIN D DEFICIENCY: ICD-10-CM

## 2021-02-15 DIAGNOSIS — Z01.818 PRE-OP EXAMINATION: Primary | ICD-10-CM

## 2021-02-15 PROCEDURE — 99214 OFFICE O/P EST MOD 30 MIN: CPT | Performed by: NURSE PRACTITIONER

## 2021-02-15 PROCEDURE — 93000 ELECTROCARDIOGRAM COMPLETE: CPT | Performed by: NURSE PRACTITIONER

## 2021-02-15 RX ORDER — CETIRIZINE HYDROCHLORIDE 10 MG/1
10 TABLET ORAL DAILY
COMMUNITY
End: 2021-10-05

## 2021-02-15 NOTE — ASSESSMENT & PLAN NOTE
Lab Results   Component Value Date    LDLCALC 151 (H) 02/28/2020     Lab Results   Component Value Date    CHOLESTEROL 242 (H) 02/28/2020    CHOLESTEROL 233 (H) 02/22/2019    CHOLESTEROL 242 (H) 03/01/2018     Lab Results   Component Value Date    HDL 70 02/28/2020    HDL 63 (H) 02/22/2019    HDL 76 (H) 03/01/2018     Lab Results   Component Value Date    TRIG 106 02/28/2020    TRIG 107 02/22/2019    TRIG 124 03/01/2018     No results found for: NONHDLC  The 10-year ASCVD risk score (Chadd Billingsley et al , 2013) is: 15 9%    Values used to calculate the score:      Age: 65 years      Sex: Female      Is Non- : No      Diabetic: No      Tobacco smoker: No      Systolic Blood Pressure: 774 mmHg      Is BP treated: No      HDL Cholesterol: 70 mg/dL      Total Cholesterol: 242 mg/dL

## 2021-02-15 NOTE — PATIENT INSTRUCTIONS
Complete blood work as soon as possible  Please call the office if you are experiencing any worsening of symptoms or no symptom improvement  Once completed we will send note and clearance to surgeon's office pending lab results

## 2021-02-15 NOTE — PROGRESS NOTES
Subjective:      Nisa Escobedo is a 65 y o  female who presents to the office today for a preoperative consultation at the request of surgeon Dr Tj Handley who plans on performing left knee arthoscopy, partial medial meniscectomy on March 9  Planned anesthesia is local and general  The patient has the following known anesthesia issues: No personal adverse events with anethesia and no family hx that she knows of    Patient has a bleeding risk of: no recent abnormal bleeding  Review of Systems  Review of Systems   Constitutional: Negative for chills and fever  Eyes: Negative for discharge  Respiratory: Negative for shortness of breath  Cardiovascular: Negative for chest pain  Gastrointestinal: Negative for constipation and diarrhea  Genitourinary: Negative for difficulty urinating  Musculoskeletal: Negative for joint swelling  Skin: Negative for rash  Neurological: Negative for headaches  Hematological: Negative for adenopathy  Psychiatric/Behavioral: The patient is not nervous/anxious  Objective:      Physical Exam    /82 (BP Location: Left arm, Patient Position: Sitting, Cuff Size: Adult)   Pulse 73   Temp (!) 96 4 °F (35 8 °C) (Temporal)   Resp 15   Ht 5' 3" (1 6 m)   Wt 74 9 kg (165 lb 3 2 oz)   SpO2 98%   BMI 29 26 kg/m²     Physical Exam  Vitals signs and nursing note reviewed  Constitutional:       General: She is not in acute distress  Appearance: She is well-developed and overweight  She is not diaphoretic  HENT:      Head: Normocephalic and atraumatic  Right Ear: External ear normal       Left Ear: External ear normal    Eyes:      General: Lids are normal          Right eye: No discharge  Left eye: No discharge  Conjunctiva/sclera: Conjunctivae normal    Neck:      Musculoskeletal: Neck supple  Cardiovascular:      Rate and Rhythm: Normal rate and regular rhythm  Heart sounds: No murmur     Pulmonary:      Effort: Pulmonary effort is normal  No respiratory distress  Breath sounds: Normal breath sounds  No wheezing  Musculoskeletal:         General: No deformity  Skin:     General: Skin is warm and dry  Neurological:      Mental Status: She is alert and oriented to person, place, and time  Psychiatric:         Speech: Speech normal          Behavior: Behavior normal          Thought Content: Thought content normal          Judgment: Judgment normal        BMI Counseling: Body mass index is 29 26 kg/m²  The BMI is above normal  Nutrition recommendations include reducing intake of cholesterol  Exercise recommendations include exercising 3-5 times per week and strength training exercises  Predictors of intubation difficulty:  Morbid obesity? no  Anatomically abnormal facies? no  Short, thick neck? no  Neck range of motion: abnormal- can be limited at times due to arthritis    Cardiographics  ECG: NSR rate 69 bpm, non specific ST and T wave changes which were present in EKG from 2014  No acute changes noted when compared to EKG from 2014  EKG also reviewed with Dr Stephen Coleman who is in agreement with interpretation  Imaging  None indicated by surgeon  Lab Review   CBC CMP aPTT PT INR stable      Assessment:      65 y o  female with planned surgery as above  Known risk factors for perioperative complications: Chronic pulmonary disease      Can walk 2 blocks without difficulty: yes   Can walk up 2 flights of stairs without difficulty:  yes     1  Pre-op examination  POCT ECG    CBC and differential    APTT    Protime-INR   2  Acute medial meniscus tear of left knee, subsequent encounter  CBC and differential    APTT    Protime-INR   3  Gastroesophageal reflux disease without esophagitis     4  Allergic rhinitis due to other allergic trigger, unspecified seasonality  CBC and differential    APTT    Protime-INR   5   Mild intermittent asthma, unspecified whether complicated  CBC and differential    APTT Protime-INR   6  Osteopenia, unspecified location  CBC and differential    APTT    Protime-INR   7  Mixed hyperlipidemia  CBC and differential    APTT    Protime-INR   8  Vitamin B12 deficiency     9  Vitamin D deficiency     10  BMI 29 0-29 9,adult  CBC and differential    APTT    Protime-INR          Plan:        Mixed hyperlipidemia  Lab Results   Component Value Date    LDLCALC 151 (H) 02/28/2020     Lab Results   Component Value Date    CHOLESTEROL 242 (H) 02/28/2020    CHOLESTEROL 233 (H) 02/22/2019    CHOLESTEROL 242 (H) 03/01/2018     Lab Results   Component Value Date    HDL 70 02/28/2020    HDL 63 (H) 02/22/2019    HDL 76 (H) 03/01/2018     Lab Results   Component Value Date    TRIG 106 02/28/2020    TRIG 107 02/22/2019    TRIG 124 03/01/2018     No results found for: NONHDLC  The 10-year ASCVD risk score (Britt Thakkar et al , 2013) is: 15 9%    Values used to calculate the score:      Age: 65 years      Sex: Female      Is Non- : No      Diabetic: No      Tobacco smoker: No      Systolic Blood Pressure: 502 mmHg      Is BP treated: No      HDL Cholesterol: 70 mg/dL      Total Cholesterol: 242 mg/dL      Gastroesophageal reflux disease without esophagitis  Stable with dietary management  Allergic rhinitis  Stable with use of zyrtec PRN    Asthma  Stable, with rare inhaler use PRN    Osteopenia  On calcium and vitamin d supplementation and weight bearing exercise as tolerated    Vitamin B12 deficiency  On supplementation     Vitamin D deficiency  On supplementation      Patient is medically cleared for surgery         Amitriptyline, Seasonal ic [cholestatin], and Montelukast  Past Medical History:   Diagnosis Date    Abnormal electrocardiogram     Prolonged QT    Asthma     GERD (gastroesophageal reflux disease)     Shortness of breath     Last assessed 03/12/14    Swallowing difficulty      Past Surgical History:   Procedure Laterality Date    CATARACT EXTRACTION Bilateral  COLONOSCOPY      CYST REMOVAL      ovarian    ENDOMETRIAL BIOPSY      HEMORROIDECTOMY      HYSTERECTOMY      IN ESOPHAGOGASTRODUODENOSCOPY TRANSORAL DIAGNOSTIC N/A 10/24/2017    Procedure: ESOPHAGOGASTRODUODENOSCOPY (EGD); Surgeon: Volodymyr Barth MD;  Location: [facility];   Service: Gastroenterology    SHOULDER SURGERY Bilateral     rotator cuff    TONSILLECTOMY      TOTAL ABDOMINAL HYSTERECTOMY W/ BILATERAL SALPINGOOPHORECTOMY       Patient Active Problem List   Diagnosis    Allergic rhinitis    Arthritis    Asthma    Gastroesophageal reflux disease without esophagitis    Mixed hyperlipidemia    Osteoarthritis    Osteopenia    Rotator cuff syndrome of right shoulder    Vitamin B12 deficiency    Vitamin D deficiency     Social History     Socioeconomic History    Marital status:      Spouse name: None    Number of children: None    Years of education: None    Highest education level: None   Occupational History    None   Social Needs    Financial resource strain: None    Food insecurity     Worry: None     Inability: None    Transportation needs     Medical: None     Non-medical: None   Tobacco Use    Smoking status: Never Smoker    Smokeless tobacco: Never Used   Substance and Sexual Activity    Alcohol use: Yes     Comment: occasionally    Drug use: No    Sexual activity: None   Lifestyle    Physical activity     Days per week: None     Minutes per session: None    Stress: None   Relationships    Social connections     Talks on phone: None     Gets together: None     Attends [employer] service: None     Active member of club or organization: None     Attends meetings of clubs or organizations: None     Relationship status: None    Intimate partner violence     Fear of current or ex partner: None     Emotionally abused: None     Physically abused: None     Forced sexual activity: None   Other Topics Concern    None   Social History Narrative    None       Current Outpatient Medications:     Ascorbic Acid (vitamin C) 1000 MG tablet, Take 1,000 mg by mouth daily, Disp: , Rfl:     calcium carbonate 1250 MG capsule, Take 1,250 mg by mouth 2 (two) times a day with meals, Disp: , Rfl:     cetirizine (ZyrTEC) 10 mg tablet, Take 10 mg by mouth daily, Disp: , Rfl:     Cholecalciferol (VITAMIN D3) 5000 units TABS, Take by mouth, Disp: , Rfl:     cyanocobalamin (VITAMIN B-12) 1,000 mcg tablet, Take by mouth daily, Disp: , Rfl:     fluticasone (FLONASE) 50 mcg/act nasal spray, 2 sprays into each nostril as needed for rhinitis or allergies, Disp: 3 Bottle, Rfl: 0    levalbuterol (Xopenex HFA) 45 mcg/act inhaler, Inhale 1 puff every 4 (four) hours as needed for wheezing, Disp: 3 Inhaler, Rfl: 1  Lab Results   Component Value Date    WBC 7 44 02/22/2021    HGB 14 4 02/22/2021    HCT 45 1 02/22/2021    MCV 97 02/22/2021     02/22/2021     Lab Results   Component Value Date     01/29/2015    K 4 4 02/22/2021     02/22/2021    CO2 32 02/22/2021    ANIONGAP 6 01/29/2015    BUN 14 02/22/2021    CREATININE 0 76 02/22/2021    GLUCOSE 99 01/29/2015    GLUF 89 02/22/2021    CALCIUM 9 3 02/22/2021    AST 21 02/22/2021    ALT 28 02/22/2021    ALKPHOS 54 02/22/2021    PROT 6 9 08/12/2014    BILITOT 0 38 08/12/2014    EGFR 79 02/22/2021     No results found for: SHANNON        [unfilled]    Current Outpatient Medications:     Ascorbic Acid (vitamin C) 1000 MG tablet, Take 1,000 mg by mouth daily, Disp: , Rfl:     calcium carbonate 1250 MG capsule, Take 1,250 mg by mouth 2 (two) times a day with meals, Disp: , Rfl:     cetirizine (ZyrTEC) 10 mg tablet, Take 10 mg by mouth daily, Disp: , Rfl:     Cholecalciferol (VITAMIN D3) 5000 units TABS, Take by mouth, Disp: , Rfl:     cyanocobalamin (VITAMIN B-12) 1,000 mcg tablet, Take by mouth daily, Disp: , Rfl:     fluticasone (FLONASE) 50 mcg/act nasal spray, 2 sprays into each nostril as needed for rhinitis or allergies, Disp: 3 Bottle, Rfl: 0    levalbuterol (Xopenex HFA) 45 mcg/act inhaler, Inhale 1 puff every 4 (four) hours as needed for wheezing, Disp: 3 Inhaler, Rfl: 1  Allergies   Allergen Reactions    Amitriptyline     Seasonal Ic [Cholestatin]     Montelukast

## 2021-02-22 ENCOUNTER — APPOINTMENT (OUTPATIENT)
Dept: LAB | Facility: CLINIC | Age: 71
End: 2021-02-22
Payer: MEDICARE

## 2021-02-22 DIAGNOSIS — J30.89 ALLERGIC RHINITIS DUE TO OTHER ALLERGIC TRIGGER, UNSPECIFIED SEASONALITY: ICD-10-CM

## 2021-02-22 DIAGNOSIS — M85.80 OSTEOPENIA, UNSPECIFIED LOCATION: ICD-10-CM

## 2021-02-22 DIAGNOSIS — S83.242D ACUTE MEDIAL MENISCUS TEAR OF LEFT KNEE, SUBSEQUENT ENCOUNTER: ICD-10-CM

## 2021-02-22 DIAGNOSIS — J45.20 MILD INTERMITTENT ASTHMA, UNSPECIFIED WHETHER COMPLICATED: ICD-10-CM

## 2021-02-22 DIAGNOSIS — E78.2 MIXED HYPERLIPIDEMIA: ICD-10-CM

## 2021-02-22 DIAGNOSIS — Z01.818 PRE-OP EXAMINATION: ICD-10-CM

## 2021-02-22 LAB
ALBUMIN SERPL BCP-MCNC: 3.7 G/DL (ref 3.5–5)
ALP SERPL-CCNC: 54 U/L (ref 46–116)
ALT SERPL W P-5'-P-CCNC: 28 U/L (ref 12–78)
ANION GAP SERPL CALCULATED.3IONS-SCNC: 2 MMOL/L (ref 4–13)
APTT PPP: 30 SECONDS (ref 23–37)
AST SERPL W P-5'-P-CCNC: 21 U/L (ref 5–45)
BASOPHILS # BLD AUTO: 0.07 THOUSANDS/ΜL (ref 0–0.1)
BASOPHILS NFR BLD AUTO: 1 % (ref 0–1)
BILIRUB SERPL-MCNC: 0.7 MG/DL (ref 0.2–1)
BUN SERPL-MCNC: 14 MG/DL (ref 5–25)
CALCIUM SERPL-MCNC: 9.3 MG/DL (ref 8.3–10.1)
CHLORIDE SERPL-SCNC: 107 MMOL/L (ref 100–108)
CHOLEST SERPL-MCNC: 225 MG/DL (ref 50–200)
CO2 SERPL-SCNC: 32 MMOL/L (ref 21–32)
CREAT SERPL-MCNC: 0.76 MG/DL (ref 0.6–1.3)
EOSINOPHIL # BLD AUTO: 0.2 THOUSAND/ΜL (ref 0–0.61)
EOSINOPHIL NFR BLD AUTO: 3 % (ref 0–6)
ERYTHROCYTE [DISTWIDTH] IN BLOOD BY AUTOMATED COUNT: 12.8 % (ref 11.6–15.1)
GFR SERPL CREATININE-BSD FRML MDRD: 79 ML/MIN/1.73SQ M
GLUCOSE P FAST SERPL-MCNC: 89 MG/DL (ref 65–99)
HCT VFR BLD AUTO: 45.1 % (ref 34.8–46.1)
HDLC SERPL-MCNC: 71 MG/DL
HGB BLD-MCNC: 14.4 G/DL (ref 11.5–15.4)
IMM GRANULOCYTES # BLD AUTO: 0.02 THOUSAND/UL (ref 0–0.2)
IMM GRANULOCYTES NFR BLD AUTO: 0 % (ref 0–2)
INR PPP: 0.95 (ref 0.84–1.19)
LDLC SERPL CALC-MCNC: 138 MG/DL (ref 0–100)
LYMPHOCYTES # BLD AUTO: 2.51 THOUSANDS/ΜL (ref 0.6–4.47)
LYMPHOCYTES NFR BLD AUTO: 34 % (ref 14–44)
MCH RBC QN AUTO: 31 PG (ref 26.8–34.3)
MCHC RBC AUTO-ENTMCNC: 31.9 G/DL (ref 31.4–37.4)
MCV RBC AUTO: 97 FL (ref 82–98)
MONOCYTES # BLD AUTO: 0.64 THOUSAND/ΜL (ref 0.17–1.22)
MONOCYTES NFR BLD AUTO: 9 % (ref 4–12)
NEUTROPHILS # BLD AUTO: 4 THOUSANDS/ΜL (ref 1.85–7.62)
NEUTS SEG NFR BLD AUTO: 53 % (ref 43–75)
NRBC BLD AUTO-RTO: 0 /100 WBCS
PLATELET # BLD AUTO: 212 THOUSANDS/UL (ref 149–390)
PMV BLD AUTO: 10.1 FL (ref 8.9–12.7)
POTASSIUM SERPL-SCNC: 4.4 MMOL/L (ref 3.5–5.3)
PROT SERPL-MCNC: 7.1 G/DL (ref 6.4–8.2)
PROTHROMBIN TIME: 12.7 SECONDS (ref 11.6–14.5)
RBC # BLD AUTO: 4.65 MILLION/UL (ref 3.81–5.12)
SODIUM SERPL-SCNC: 141 MMOL/L (ref 136–145)
T3FREE SERPL-MCNC: 2.65 PG/ML (ref 2.3–4.2)
T4 FREE SERPL-MCNC: 0.95 NG/DL (ref 0.76–1.46)
TRIGL SERPL-MCNC: 81 MG/DL
TSH SERPL DL<=0.05 MIU/L-ACNC: 1.92 UIU/ML (ref 0.36–3.74)
WBC # BLD AUTO: 7.44 THOUSAND/UL (ref 4.31–10.16)

## 2021-02-22 PROCEDURE — 84439 ASSAY OF FREE THYROXINE: CPT

## 2021-02-22 PROCEDURE — 36415 COLL VENOUS BLD VENIPUNCTURE: CPT

## 2021-02-22 PROCEDURE — 85025 COMPLETE CBC W/AUTO DIFF WBC: CPT

## 2021-02-22 PROCEDURE — 84443 ASSAY THYROID STIM HORMONE: CPT

## 2021-02-22 PROCEDURE — 85610 PROTHROMBIN TIME: CPT

## 2021-02-22 PROCEDURE — 80053 COMPREHEN METABOLIC PANEL: CPT

## 2021-02-22 PROCEDURE — 85730 THROMBOPLASTIN TIME PARTIAL: CPT

## 2021-02-22 PROCEDURE — 80061 LIPID PANEL: CPT

## 2021-02-22 PROCEDURE — 84481 FREE ASSAY (FT-3): CPT

## 2021-03-10 ENCOUNTER — EVALUATION (OUTPATIENT)
Dept: PHYSICAL THERAPY | Facility: MEDICAL CENTER | Age: 71
End: 2021-03-10
Payer: MEDICARE

## 2021-03-10 DIAGNOSIS — Z23 ENCOUNTER FOR IMMUNIZATION: ICD-10-CM

## 2021-03-10 DIAGNOSIS — S83.242D ACUTE MEDIAL MENISCUS TEAR OF LEFT KNEE, SUBSEQUENT ENCOUNTER: Primary | ICD-10-CM

## 2021-03-10 DIAGNOSIS — Z48.89 OTHER SPECIFIED AFTERCARE FOLLOWING SURGERY: ICD-10-CM

## 2021-03-10 PROCEDURE — 97161 PT EVAL LOW COMPLEX 20 MIN: CPT | Performed by: PHYSICAL THERAPIST

## 2021-03-10 PROCEDURE — 97110 THERAPEUTIC EXERCISES: CPT | Performed by: PHYSICAL THERAPIST

## 2021-03-10 NOTE — PROGRESS NOTES
PT Evaluation     Today's date: 3/10/2021  Patient name: Cecil Levin  : 1950  MRN: [id number]  Referring provider: Frances Blair MD  Dx:   Encounter Diagnosis     ICD-10-CM    1  Acute medial meniscus tear of left knee, subsequent encounter  S83 242D    2  Other specified aftercare following surgery  Z48 89                   Assessment  Assessment details: Cecil Levin is a 65 y o  female presents s/p medial menisectomy 3/9/21    Cecil Levin has the below listed impairments and will benefit from skilled PT to improve deficits to return to prior level of function  Impairments: abnormal muscle firing, abnormal or restricted ROM, impaired physical strength and pain with function  Understanding of Dx/Px/POC: good   Prognosis: good    Goals  Impairment Goals  - Decrease pain to 0-2/10  - Improve ROM equal to contralateral side  - Increase strength equal to contralateral side    Functional Goals  - Patient will be independent with comprehensive HEP  - Ambulation is improved to prior level of function  - Stair climbing is improved to prior level of function  - Squatting is improved to prior level of function    Plan  Patient would benefit from: skilled PT  Referral necessary: No  Planned therapy interventions: home exercise program, manual therapy, neuromuscular re-education, patient education, functional ROM exercises, strengthening, stretching and joint mobilization  Frequency: 2x week  Duration in weeks: 12  Treatment plan discussed with: patient        Subjective Evaluation    History of Present Illness  Mechanism of injury: Jessica presents s/p L medial menisectomy on 3/9/20  Jessica reports no complications during surgery and currently ambulating with SPC  She reports she took the dressing off this morning bc it was falling down and she noted the 1 of her steri strips came off and her incision was open and bleeding  She applied gauze until she came to PT     Pain  At worst pain ratin    Patient Goals  Patient goals for therapy: decreased pain, increased motion and increased strength          Objective     Observations   Left Knee   Positive for incision  Additional Observation Details  Medial incision open with visible separation of epidermis with knee flexion       Active Range of Motion   Left Knee   Flexion: 70 degrees   Extension: 0 degrees     Additional Active Range of Motion Details  ROM limited by patient concern for open incision    Strength/Myotome Testing     Left Hip   Planes of Motion   Flexion: 3  Extension: 3  Abduction: 3  Adduction: 3    Left Knee   Flexion: 3  Extension: 3  Quadriceps contraction: fair             Precautions: none      Manuals 3/10            L patellar mobs             L knee PROM                                       Neuro Re-Ed                                                                                                        Ther Ex             Quad sets 10            SLR 10            gastroc stretch 30"x3            Heel slides 10            SLR x 3             HS stretch                                       Ther Activity                                       Gait Training                                       Modalities             ice

## 2021-03-12 ENCOUNTER — OFFICE VISIT (OUTPATIENT)
Dept: PHYSICAL THERAPY | Facility: MEDICAL CENTER | Age: 71
End: 2021-03-12
Payer: MEDICARE

## 2021-03-12 DIAGNOSIS — S83.242D ACUTE MEDIAL MENISCUS TEAR OF LEFT KNEE, SUBSEQUENT ENCOUNTER: Primary | ICD-10-CM

## 2021-03-12 DIAGNOSIS — Z48.89 OTHER SPECIFIED AFTERCARE FOLLOWING SURGERY: ICD-10-CM

## 2021-03-12 PROCEDURE — 97110 THERAPEUTIC EXERCISES: CPT | Performed by: PHYSICAL THERAPIST

## 2021-03-12 NOTE — PROGRESS NOTES
Daily Note     Today's date: 3/12/2021  Patient name: Manuel Young  : 1950  MRN: [id number]  Referring provider: Alicja Finch MD  Dx:   Encounter Diagnosis     ICD-10-CM    1  Acute medial meniscus tear of left knee, subsequent encounter  S83 242D    2  Other specified aftercare following surgery  Z48 89                   Subjective: Jessica reports her knee feels good  She went back to [facility] after IE and had incision closed and has not been a problem since  Objective: See treatment diary below      Assessment: Tolerated treatment well  Patient exhibited good technique with therapeutic exercises      Plan: Progress treatment as tolerated         Precautions: none      Manuals 3/10 3/12           L patellar mobs  JE           L knee PROM  JE                                     Neuro Re-Ed                                                                                                        Ther Ex             Quad sets 10 20           SLR 10 2x10           gastroc stretch 30"x3 30"x3           Heel slides 10 30           S/L hip abd  2x10           HS stretch seated  30"x3           LAQ  20           bike  5'           Stand hip ext  2x10           HS curl stand  2x10                        Ther Activity                                       Gait Training                                       Modalities             ice

## 2021-03-15 ENCOUNTER — OFFICE VISIT (OUTPATIENT)
Dept: PHYSICAL THERAPY | Facility: MEDICAL CENTER | Age: 71
End: 2021-03-15
Payer: MEDICARE

## 2021-03-15 DIAGNOSIS — Z48.89 OTHER SPECIFIED AFTERCARE FOLLOWING SURGERY: ICD-10-CM

## 2021-03-15 DIAGNOSIS — S83.242D ACUTE MEDIAL MENISCUS TEAR OF LEFT KNEE, SUBSEQUENT ENCOUNTER: Primary | ICD-10-CM

## 2021-03-15 PROCEDURE — 97110 THERAPEUTIC EXERCISES: CPT | Performed by: PHYSICAL THERAPIST

## 2021-03-15 NOTE — PROGRESS NOTES
Daily Note     Today's date: 3/15/2021  Patient name: Emmett Mcbride  : 1950  MRN: [id number]  Referring provider: Abdirashid Brown MD  Dx:   Encounter Diagnosis     ICD-10-CM    1  Acute medial meniscus tear of left knee, subsequent encounter  S83 242D    2  Other specified aftercare following surgery  Z48 89                   Subjective: Jessica reports her knee has been feeling better  She went down the stairs normally this morning      Objective: See treatment diary below      Assessment: Tolerated treatment well  Patient exhibited good technique with therapeutic exercises      Plan: Progress treatment as tolerated         Precautions: none      Manuals 3/10 3/12 3/15          L patellar mobs  JE JE          L knee PROM  JE JE                                    Neuro Re-Ed                                                                                                        Ther Ex             Quad sets 10 20 30          SLR 10 2x10 x210          gastroc stretch 30"x3 30"x3 30"x3          Heel slides 10 30 30          S/L hip abd  2x10 2x10          HS stretch seated  30"x3 30"x3          LAQ  20 30          bike  5'           Stand hip ext  2x10 3x10          HS curl stand  2x10 3x10          bridges   3x10          Ther Activity                                       Gait Training                                       Modalities             ice

## 2021-03-17 ENCOUNTER — OFFICE VISIT (OUTPATIENT)
Dept: PHYSICAL THERAPY | Facility: MEDICAL CENTER | Age: 71
End: 2021-03-17
Payer: MEDICARE

## 2021-03-17 DIAGNOSIS — S83.242D ACUTE MEDIAL MENISCUS TEAR OF LEFT KNEE, SUBSEQUENT ENCOUNTER: Primary | ICD-10-CM

## 2021-03-17 DIAGNOSIS — Z48.89 OTHER SPECIFIED AFTERCARE FOLLOWING SURGERY: ICD-10-CM

## 2021-03-17 PROCEDURE — 97110 THERAPEUTIC EXERCISES: CPT | Performed by: PHYSICAL THERAPIST

## 2021-03-17 NOTE — PROGRESS NOTES
Daily Note     Today's date: 3/17/2021  Patient name: Woo Mendoza  : 1950  MRN: [id number]  Referring provider: Maryjo Garica MD  Dx:   Encounter Diagnosis     ICD-10-CM    1  Acute medial meniscus tear of left knee, subsequent encounter  S83 242D    2  Other specified aftercare following surgery  Z48 89                   Subjective: Jessica reports she went up and down the stairs normally a few times yesterday      Objective: See treatment diary below  ROM 0-126 deg      Assessment: Tolerated treatment well  Patient exhibited good technique with therapeutic exercises      Plan: Progress treatment as tolerated         Precautions: none      Manuals 3/10 3/12 3/15 3/17         L patellar mobs  JE JE JE         L knee PROM  MUKUND DUVAL JE                                   Neuro Re-Ed                                                                                                        Ther Ex             Quad sets 10 20 30 30         SLR 10 2x10 x210 2x10         gastroc stretch 30"x3 30"x3 30"x3 30"x3         Heel slides 10 30 30 30         S/L hip abd  2x10 2x10 2x10         HS stretch seated  30"x3 30"x3 30"x3         LAQ  20 30 30         bike  5'  5'         Stand hip ext  2x10 3x10 3x10         HS curl stand  2x10 3x10 3x10         bridges   3x10 3x10         Heel raises    30         Lat step ups    L2 10         Ther Activity                                       Gait Training                                       Modalities             ice

## 2021-03-22 ENCOUNTER — OFFICE VISIT (OUTPATIENT)
Dept: PHYSICAL THERAPY | Facility: MEDICAL CENTER | Age: 71
End: 2021-03-22
Payer: MEDICARE

## 2021-03-22 DIAGNOSIS — Z48.89 OTHER SPECIFIED AFTERCARE FOLLOWING SURGERY: ICD-10-CM

## 2021-03-22 DIAGNOSIS — S83.242D ACUTE MEDIAL MENISCUS TEAR OF LEFT KNEE, SUBSEQUENT ENCOUNTER: Primary | ICD-10-CM

## 2021-03-22 PROCEDURE — 97140 MANUAL THERAPY 1/> REGIONS: CPT | Performed by: PHYSICAL THERAPIST

## 2021-03-22 PROCEDURE — 97110 THERAPEUTIC EXERCISES: CPT | Performed by: PHYSICAL THERAPIST

## 2021-03-22 NOTE — PROGRESS NOTES
Daily Note     Today's date: 3/22/2021  Patient name: Vaishali Stephenson  : 1950  MRN: [id number]  Referring provider: Zita Mccabe MD  Dx:   Encounter Diagnosis     ICD-10-CM    1  Acute medial meniscus tear of left knee, subsequent encounter  S83 242D    2  Other specified aftercare following surgery  Z48 89                   Subjective: Jessica reports her knee has been feeling good  She has been going up and down the stairs, driving, and went for a short walk      Objective: See treatment diary below      Assessment: Tolerated treatment well  Patient exhibited good technique with therapeutic exercises      Plan: Progress treatment as tolerated         Precautions: none      Manuals 3/10 3/12 3/15 3/17 3/22        L patellar mobs  MUKUND DUVAL JE JE        L knee PROM  MUKUND DUVAL JE                                  Neuro Re-Ed                                                                                                        Ther Ex             Quad sets 10 20 30 30 30        SLR 10 2x10 x210 2x10 2x10        gastroc stretch 30"x3 30"x3 30"x3 30"x3 30"x3        Heel slides 10 30 30 30 30        S/L hip abd  2x10 2x10 2x10 2x10        HS stretch seated  30"x3 30"x3 30"x3 30"x3        LAQ  20 30 30 30        bike  5'  5' 5'        Stand hip ext  2x10 3x10 3x10 3x10        HS curl stand  2x10 3x10 3x10 3x10        bridges   3x10 3x10 3x10        Heel raises    30 30        Lat step ups    L2 10 L2 10        Ther Activity                                       Gait Training                                       Modalities             ice

## 2021-03-24 ENCOUNTER — OFFICE VISIT (OUTPATIENT)
Dept: PHYSICAL THERAPY | Facility: MEDICAL CENTER | Age: 71
End: 2021-03-24
Payer: MEDICARE

## 2021-03-24 DIAGNOSIS — S83.242D ACUTE MEDIAL MENISCUS TEAR OF LEFT KNEE, SUBSEQUENT ENCOUNTER: Primary | ICD-10-CM

## 2021-03-24 DIAGNOSIS — Z48.89 OTHER SPECIFIED AFTERCARE FOLLOWING SURGERY: ICD-10-CM

## 2021-03-24 PROCEDURE — 97140 MANUAL THERAPY 1/> REGIONS: CPT | Performed by: PHYSICAL THERAPIST

## 2021-03-24 PROCEDURE — 97110 THERAPEUTIC EXERCISES: CPT | Performed by: PHYSICAL THERAPIST

## 2021-03-24 NOTE — PROGRESS NOTES
Daily Note     Today's date: 3/24/2021  Patient name: Rae Galindo  : 1950  MRN: [id number]  Referring provider: Melissa Galicia MD  Dx:   Encounter Diagnosis     ICD-10-CM    1  Acute medial meniscus tear of left knee, subsequent encounter  S83 527D    2  Other specified aftercare following surgery  Z48 [id number]                   Subjective: Jessica reports her knee has been feeling really good and she feels ready to dc to [facility]      Objective: See treatment diary below  0-134 deg      Assessment: Tolerated treatment well   Patient exhibited good technique with therapeutic exercises      Plan: DC to [facility]     Precautions: none      Manuals 3/10 3/12 3/15 3/17 3/22 3/24       L patellar mobs  JE MUKUND JE JE JE       L knee PROM  JE MUKUND JE JE JE                                 Neuro Re-Ed                                                                                                        Ther Ex             Quad sets 10 20 30 30 30 30       SLR 10 2x10 x210 2x10 2x10 2x10       gastroc stretch 30"x3 30"x3 30"x3 30"x3 30"x3 30"x3       Heel slides 10 30 30 30 30 30       S/L hip abd  2x10 2x10 2x10 2x10 3x10       HS stretch seated  30"x3 30"x3 30"x3 30"x3 30"x       LAQ  20 30 30 30 30       bike  5'  5' 5' 5'       Stand hip ext  2x10 3x10 3x10 3x10 3x10       HS curl stand  2x10 3x10 3x10 3x10 3x10       bridges   3x10 3x10 3x10 3x10       Heel raises    30 30 30       Lat step ups    L2 10 L2 10 L2 15       Ther Activity                                       Gait Training                                       Modalities             ice

## 2021-04-27 ENCOUNTER — OFFICE VISIT (OUTPATIENT)
Dept: FAMILY MEDICINE CLINIC | Facility: CLINIC | Age: 71
End: 2021-04-27
Payer: MEDICARE

## 2021-04-27 VITALS
HEIGHT: 63 IN | DIASTOLIC BLOOD PRESSURE: 74 MMHG | WEIGHT: 163 LBS | SYSTOLIC BLOOD PRESSURE: 124 MMHG | RESPIRATION RATE: 16 BRPM | BODY MASS INDEX: 28.88 KG/M2 | HEART RATE: 72 BPM | OXYGEN SATURATION: 98 % | TEMPERATURE: 97.5 F

## 2021-04-27 DIAGNOSIS — I83.813 VARICOSE VEINS OF BOTH LOWER EXTREMITIES WITH PAIN: ICD-10-CM

## 2021-04-27 DIAGNOSIS — Z00.00 MEDICARE ANNUAL WELLNESS VISIT, SUBSEQUENT: Primary | ICD-10-CM

## 2021-04-27 PROCEDURE — 1123F ACP DISCUSS/DSCN MKR DOCD: CPT | Performed by: FAMILY MEDICINE

## 2021-04-27 PROCEDURE — G0439 PPPS, SUBSEQ VISIT: HCPCS | Performed by: FAMILY MEDICINE

## 2021-04-27 NOTE — PATIENT INSTRUCTIONS
Medicare Preventive Visit Patient Instructions  Thank you for completing your Welcome to Medicare Visit or Medicare Annual Wellness Visit today  Your next wellness visit will be due in one year (4/28/2022)  The screening/preventive services that you may require over the next 5-10 years are detailed below  Some tests may not apply to you based off risk factors and/or age  Screening tests ordered at today's visit but not completed yet may show as past due  Also, please note that scanned in results may not display below  Preventive Screenings:  Service Recommendations Previous Testing/Comments   Colorectal Cancer Screening  * Colonoscopy    * Fecal Occult Blood Test (FOBT)/Fecal Immunochemical Test (FIT)  * Fecal DNA/Cologuard Test  * Flexible Sigmoidoscopy Age: 54-65 years old   Colonoscopy: every 10 years (may be performed more frequently if at higher risk)  OR  FOBT/FIT: every 1 year  OR  Cologuard: every 3 years  OR  Sigmoidoscopy: every 5 years  Screening may be recommended earlier than age 48 if at higher risk for colorectal cancer  Also, an individualized decision between you and your healthcare provider will decide whether screening between the ages of 74-80 would be appropriate  Colonoscopy: 10/13/2015  FOBT/FIT: Not on file  Cologuard: Not on file  Sigmoidoscopy: Not on file    Screening Current     Breast Cancer Screening Age: 36 years old  Frequency: every 1-2 years  Not required if history of left and right mastectomy Mammogram: 12/10/2020    Screening Current   Cervical Cancer Screening Between the ages of 21-29, pap smear recommended once every 3 years  Between the ages of 33-67, can perform pap smear with HPV co-testing every 5 years     Recommendations may differ for women with a history of total hysterectomy, cervical cancer, or abnormal pap smears in past  Pap Smear: 09/06/2018    Screening Not Indicated   Hepatitis C Screening Once for adults born between 1945 and 1965  More frequently in patients at high risk for Hepatitis C Hep C Antibody: Not on file        Diabetes Screening 1-2 times per year if you're at risk for diabetes or have pre-diabetes Fasting glucose: 89 mg/dL   A1C: No results in last 5 years    Screening Current   Cholesterol Screening Once every 5 years if you don't have a lipid disorder  May order more often based on risk factors  Lipid panel: 02/22/2021    Screening Not Indicated  History Lipid Disorder     Other Preventive Screenings Covered by Medicare:  1  Abdominal Aortic Aneurysm (AAA) Screening: covered once if your at risk  You're considered to be at risk if you have a family history of AAA  2  Lung Cancer Screening: covers low dose CT scan once per year if you meet all of the following conditions: (1) Age 50-69; (2) No signs or symptoms of lung cancer; (3) Current smoker or have quit smoking within the last 15 years; (4) You have a tobacco smoking history of at least 30 pack years (packs per day multiplied by number of years you smoked); (5) You get a written order from a healthcare provider  3  Glaucoma Screening: covered annually if you're considered high risk: (1) You have diabetes OR (2) Family history of glaucoma OR (3)  aged 48 and older OR (3)  American aged 72 and older  3  Osteoporosis Screening: covered every 2 years if you meet one of the following conditions: (1) You're estrogen deficient and at risk for osteoporosis based off medical history and other findings; (2) Have a vertebral abnormality; (3) On glucocorticoid therapy for more than 3 months; (4) Have primary hyperparathyroidism; (5) On osteoporosis medications and need to assess response to drug therapy  · Last bone density test (DXA Scan): 11/02/2018  5  HIV Screening: covered annually if you're between the age of 12-76  Also covered annually if you are younger than 13 and older than 72 with risk factors for HIV infection   For pregnant patients, it is covered up to 3 times per pregnancy  Immunizations:  Immunization Recommendations   Influenza Vaccine Annual influenza vaccination during flu season is recommended for all persons aged >= 6 months who do not have contraindications   Pneumococcal Vaccine (Prevnar and Pneumovax)  * Prevnar = PCV13  * Pneumovax = PPSV23   Adults 25-60 years old: 1-3 doses may be recommended based on certain risk factors  Adults 72 years old: Prevnar (PCV13) vaccine recommended followed by Pneumovax (PPSV23) vaccine  If already received PPSV23 since turning 65, then PCV13 recommended at least one year after PPSV23 dose  Hepatitis B Vaccine 3 dose series if at intermediate or high risk (ex: diabetes, end stage renal disease, liver disease)   Tetanus (Td) Vaccine - COST NOT COVERED BY MEDICARE PART B Following completion of primary series, a booster dose should be given every 10 years to maintain immunity against tetanus  Td may also be given as tetanus wound prophylaxis  Tdap Vaccine - COST NOT COVERED BY MEDICARE PART B Recommended at least once for all adults  For pregnant patients, recommended with each pregnancy  Shingles Vaccine (Shingrix) - COST NOT COVERED BY MEDICARE PART B  2 shot series recommended in those aged 48 and above     Health Maintenance Due:      Topic Date Due    Hepatitis C Screening  Never done    MAMMOGRAM  12/10/2021    Colorectal Cancer Screening  10/13/2025     Immunizations Due:  There are no preventive care reminders to display for this patient  Advance Directives   What are advance directives? Advance directives are legal documents that state your wishes and plans for medical care  These plans are made ahead of time in case you lose your ability to make decisions for yourself  Advance directives can apply to any medical decision, such as the treatments you want, and if you want to donate organs  What are the types of advance directives?   There are many types of advance directives, and each state has rules about how to use them  You may choose a combination of any of the following:  · Living will: This is a written record of the treatment you want  You can also choose which treatments you do not want, which to limit, and which to stop at a certain time  This includes surgery, medicine, IV fluid, and tube feedings  · Durable power of  for healthcare [facility]): This is a written record that states who you want to make healthcare choices for you when you are unable to make them for yourself  This person, called a proxy, is usually a family member or a friend  You may choose more than 1 proxy  · Do not resuscitate (DNR) order:  A DNR order is used in case your heart stops beating or you stop breathing  It is a request not to have certain forms of treatment, such as CPR  A DNR order may be included in other types of advance directives  · Medical directive: This covers the care that you want if you are in a coma, near death, or unable to make decisions for yourself  You can list the treatments you want for each condition  Treatment may include pain medicine, surgery, blood transfusions, dialysis, IV or tube feedings, and a ventilator (breathing machine)  · Values history: This document has questions about your views, beliefs, and how you feel and think about life  This information can help others choose the care that you would choose  Why are advance directives important? An advance directive helps you control your care  Although spoken wishes may be used, it is better to have your wishes written down  Spoken wishes can be misunderstood, or not followed  Treatments may be given even if you do not want them  An advance directive may make it easier for your family to make difficult choices about your care     Weight Management   Why it is important to manage your weight:  Being overweight increases your risk of health conditions such as heart disease, high blood pressure, type 2 diabetes, and certain types of cancer  It can also increase your risk for osteoarthritis, sleep apnea, and other respiratory problems  Aim for a slow, steady weight loss  Even a small amount of weight loss can lower your risk of health problems  How to lose weight safely:  A safe and healthy way to lose weight is to eat fewer calories and get regular exercise  You can lose up about 1 pound a week by decreasing the number of calories you eat by 500 calories each day  Healthy meal plan for weight management:  A healthy meal plan includes a variety of foods, contains fewer calories, and helps you stay healthy  A healthy meal plan includes the following:  · Eat whole-grain foods more often  A healthy meal plan should contain fiber  Fiber is the part of grains, fruits, and vegetables that is not broken down by your body  Whole-grain foods are healthy and provide extra fiber in your diet  Some examples of whole-grain foods are whole-wheat breads and pastas, oatmeal, brown rice, and bulgur  · Eat a variety of vegetables every day  Include dark, leafy greens such as spinach, kale, leena greens, and mustard greens  Eat yellow and orange vegetables such as carrots, sweet potatoes, and winter squash  · Eat a variety of fruits every day  Choose fresh or canned fruit (canned in its own juice or light syrup) instead of juice  Fruit juice has very little or no fiber  · Eat low-fat dairy foods  Drink fat-free (skim) milk or 1% milk  Eat fat-free yogurt and low-fat cottage cheese  Try low-fat cheeses such as mozzarella and other reduced-fat cheeses  · Choose meat and other protein foods that are low in fat  Choose beans or other legumes such as split peas or lentils  Choose fish, skinless poultry (chicken or turkey), or lean cuts of red meat (beef or pork)  Before you cook meat or poultry, cut off any visible fat  · Use less fat and oil  Try baking foods instead of frying them   Add less fat, such as margarine, sour cream, regular salad dressing and mayonnaise to foods  Eat fewer high-fat foods  Some examples of high-fat foods include french fries, doughnuts, ice cream, and cakes  · Eat fewer sweets  Limit foods and drinks that are high in sugar  This includes candy, cookies, regular soda, and sweetened drinks  Exercise:  Exercise at least 30 minutes per day on most days of the week  Some examples of exercise include walking, biking, dancing, and swimming  You can also fit in more physical activity by taking the stairs instead of the elevator or parking farther away from stores  Ask your healthcare provider about the best exercise plan for you  © Copyright [employer] 2018 Information is for End User's use only and may not be sold, redistributed or otherwise used for commercial purposes   All illustrations and images included in CareNotes® are the copyrighted property of A D A M , Inc  or [address] [place] [address] [employer]

## 2021-04-27 NOTE — PROGRESS NOTES
Assessment and Plan:     Problem List Items Addressed This Visit     None      Visit Diagnoses     Medicare annual wellness visit, subsequent    -  Primary    Varicose veins of both lower extremities with pain           consider possible venous reflux study if symptoms worsen     Preventive health issues were discussed with patient, and age appropriate screening tests were ordered as noted in patient's After Visit Summary  Personalized health advice and appropriate referrals for health education or preventive services given if needed, as noted in patient's After Visit Summary  History of Present Illness:     Patient presents for Welcome to Medicare visit  Patient Care Team:  Anne Noel DO as PCP - General  Camacho Aguirre MD     Review of Systems:   Moving to [facility]  Review of Systems   Cardiovascular:        Painful lumps in the upper thighs that are worse by the end of the day after she has been on her feet  Musculoskeletal: Arthralgias: very happy with knee        Problem List:     Patient Active Problem List   Diagnosis    Allergic rhinitis    Arthritis    Asthma    Gastroesophageal reflux disease without esophagitis    Mixed hyperlipidemia    Osteoarthritis    Osteopenia    Rotator cuff syndrome of right shoulder    Vitamin B12 deficiency    Vitamin D deficiency      Past Medical and Surgical History:     Past Medical History:   Diagnosis Date    Abnormal electrocardiogram     Prolonged QT    Asthma     GERD (gastroesophageal reflux disease)     Shortness of breath     Last assessed 03/12/14    Swallowing difficulty      Past Surgical History:   Procedure Laterality Date    CATARACT EXTRACTION Bilateral     COLONOSCOPY      CYST REMOVAL      ovarian    ENDOMETRIAL BIOPSY      HEMORROIDECTOMY      HYSTERECTOMY      KNEE ARTHROSCOPY W/ MENISCAL REPAIR Left 03/09/2020    MN ESOPHAGOGASTRODUODENOSCOPY TRANSORAL DIAGNOSTIC N/A 10/24/2017    Procedure: ESOPHAGOGASTRODUODENOSCOPY (EGD); Surgeon: Tegan Rodriguez MD;  Location: [facility];   Service: Gastroenterology    SHOULDER SURGERY Bilateral     rotator cuff    TONSILLECTOMY      TOTAL ABDOMINAL HYSTERECTOMY W/ BILATERAL SALPINGOOPHORECTOMY        Family History:     Family History   Problem Relation Age of Onset    Diabetes Mother     Hypertension Mother     Skin cancer Mother     Heart disease Family     Other Family         CVA    Cancer Family     No Known Problems Father     Skin cancer Sister     No Known Problems Daughter     No Known Problems Maternal Grandmother     No Known Problems Maternal Grandfather     No Known Problems Paternal Grandmother     No Known Problems Paternal Grandfather     Prostate cancer Brother 77    No Known Problems Sister     No Known Problems Daughter     No Known Problems Maternal Aunt     No Known Problems Maternal Aunt     No Known Problems Maternal Aunt     No Known Problems Maternal Aunt     No Known Problems Maternal Aunt     No Known Problems Paternal Aunt       Social History:        Social History     Socioeconomic History    Marital status:      Spouse name: None    Number of children: None    Years of education: None    Highest education level: None   Occupational History    None   Social Needs    Financial resource strain: None    Food insecurity     Worry: None     Inability: None    Transportation needs     Medical: None     Non-medical: None   Tobacco Use    Smoking status: Never Smoker    Smokeless tobacco: Never Used   Substance and Sexual Activity    Alcohol use: Yes     Comment: occasionally    Drug use: No    Sexual activity: None   Lifestyle    Physical activity     Days per week: None     Minutes per session: None    Stress: None   Relationships    Social connections     Talks on phone: None     Gets together: None     Attends Yazidi service: None     Active member of club or organization: None     Attends meetings of clubs or organizations: None     Relationship status: None    Intimate partner violence     Fear of current or ex partner: None     Emotionally abused: None     Physically abused: None     Forced sexual activity: None   Other Topics Concern    None   Social History Narrative    None      Medications and Allergies:     Current Outpatient Medications   Medication Sig Dispense Refill    calcium carbonate 1250 MG capsule Take 1,250 mg by mouth 2 (two) times a day with meals      cetirizine (ZyrTEC) 10 mg tablet Take 10 mg by mouth daily      Cholecalciferol (VITAMIN D3) 5000 units TABS Take by mouth      cyanocobalamin (VITAMIN B-12) 1,000 mcg tablet Take by mouth daily      fluticasone (FLONASE) 50 mcg/act nasal spray 2 sprays into each nostril as needed for rhinitis or allergies 3 Bottle 0    levalbuterol (Xopenex HFA) 45 mcg/act inhaler Inhale 1 puff every 4 (four) hours as needed for wheezing 3 Inhaler 1    Ascorbic Acid (vitamin C) 1000 MG tablet Take 1,000 mg by mouth daily       No current facility-administered medications for this visit        Allergies   Allergen Reactions    Amitriptyline     Seasonal Ic [Cholestatin]     Montelukast       Immunizations:     Immunization History   Administered Date(s) Administered    H1N1, All Formulations 11/04/2009    Influenza Split High Dose Preservative Free IM 10/31/2016, 09/29/2017    Influenza Whole 10/19/2019    Influenza, high dose seasonal 0 7 mL 10/24/2018, 10/13/2020    Influenza, seasonal, injectable 10/01/2015    Pneumococcal Conjugate 13-Valent 02/21/2018    Pneumococcal Polysaccharide PPV23 01/18/2017    SARS-CoV-2 / COVID-19 mRNA IM (Pfizer-BioNTech) 03/05/2021, 03/29/2021    Tdap 02/28/2019    Zoster 08/30/2016      Health Maintenance:         Topic Date Due    Hepatitis C Screening  05/02/2030 (Originally 1950)    MAMMOGRAM  12/10/2021    Colorectal Cancer Screening  10/13/2025     There are no preventive care reminders to display for this patient  Medicare Screening Tests and Risk Assessments:     Jessica is here for her Subsequent Wellness visit  Health Risk Assessment:   Patient rates overall health as excellent  Patient feels that their physical health rating is much better  Patient is very satisfied with their life  Eyesight was rated as same  Hearing was rated as same  Patient feels that their emotional and mental health rating is same  Patients states they are never, rarely angry  Patient states they are never, rarely unusually tired/fatigued  Pain experienced in the last 7 days has been none  Patient states that she has experienced no weight loss or gain in last 6 months  Depression Screening:   PHQ-2 Score: 0      Fall Risk Screening: In the past year, patient has experienced: no history of falling in past year      Urinary Incontinence Screening:   Patient has not leaked urine accidently in the last six months  Home Safety:  Patient does not have trouble with stairs inside or outside of their home  Patient has working smoke alarms and has working carbon monoxide detector  Home safety hazards include: none  Nutrition:   Current diet is Regular  Medications:   Patient is currently taking over-the-counter supplements  OTC medications include: see medication list  Patient is able to manage medications  Activities of Daily Living (ADLs)/Instrumental Activities of Daily Living (IADLs):   Walk and transfer into and out of bed and chair?: Yes  Dress and groom yourself?: Yes    Bathe or shower yourself?: Yes    Feed yourself?  Yes  Do your laundry/housekeeping?: Yes  Manage your money, pay your bills and track your expenses?: Yes  Make your own meals?: Yes    Do your own shopping?: Yes    Previous Hospitalizations:   Any hospitalizations or ED visits within the last 12 months?: Yes    How many hospitalizations have you had in the last year?: 1-2    Advance Care Planning:   Living will: No Durable POA for healthcare: Yes    Advanced directive: No    Five wishes given: Yes    End of Life Decisions reviewed with patient: Yes      Cognitive Screening:   Provider or family/friend/caregiver concerned regarding cognition?: No    PREVENTIVE SCREENINGS      Cardiovascular Screening:    General: Screening Not Indicated and History Lipid Disorder      Diabetes Screening:     General: Screening Current      Colorectal Cancer Screening:     General: Screening Current      Breast Cancer Screening:     General: Screening Current      Cervical Cancer Screening:    General: Screening Not Indicated      Osteoporosis Screening:    General: Risks and Benefits Discussed      Abdominal Aortic Aneurysm (AAA) Screening:        General: Screening Not Indicated      Lung Cancer Screening:     General: Screening Not Indicated      Hepatitis C Screening:    General: Screening Current    Screening, Brief Intervention, and Referral to Treatment (SBIRT)    Screening  Typical number of drinks in a day: 1  Typical number of drinks in a week: 7  Interpretation: Low risk drinking behavior  Single Item Drug Screening:  How often have you used an illegal drug (including marijuana) or a prescription medication for non-medical reasons in the past year? never    Single Item Drug Screen Score: 0  Interpretation: Negative screen for possible drug use disorder    No exam data present     Physical Exam:     /74   Pulse 72   Temp 97 5 °F (36 4 °C) (Temporal)   Resp 16   Ht 5' 3 39" (1 61 m)   Wt 73 9 kg (163 lb)   SpO2 98%   BMI 28 52 kg/m²     Physical Exam  Vitals signs and nursing note reviewed  Constitutional:       General: She is not in acute distress  Appearance: Normal appearance  She is well-developed  HENT:      Head: Normocephalic and atraumatic  Eyes:      Conjunctiva/sclera: Conjunctivae normal    Neck:      Musculoskeletal: Neck supple  Vascular: No carotid bruit     Cardiovascular:      Rate and Rhythm: Normal rate and regular rhythm  Pulses: Normal pulses  Heart sounds: No murmur  Comments: Linear type lumps that are noted in both right and left upper thighs consistent with probable varicose vein  Pulmonary:      Effort: Pulmonary effort is normal  No respiratory distress  Breath sounds: Normal breath sounds  Abdominal:      General: Bowel sounds are normal       Palpations: Abdomen is soft  Tenderness: There is no abdominal tenderness  Musculoskeletal:      Comments: Trocar sites well healed left knee   Skin:     General: Skin is warm and dry  Neurological:      Mental Status: She is alert and oriented to person, place, and time  Psychiatric:         Mood and Affect: Mood normal          Behavior: Behavior normal          Thought Content:  Thought content normal          Judgment: Judgment normal           Chriss Diaz, DO

## 2021-08-24 DIAGNOSIS — J45.20 MILD INTERMITTENT ASTHMA, UNSPECIFIED WHETHER COMPLICATED: ICD-10-CM

## 2021-08-24 NOTE — TELEPHONE ENCOUNTER
Pt is requesting Levalbututerol inhaler to printed so that she can use the Good rx card   Pt states she gets 3 inhalers at one time  Can you please order and print for pt so that she may  script here in the office? Please advise   Thanks

## 2021-08-25 RX ORDER — LEVALBUTEROL TARTRATE 45 UG/1
1 AEROSOL, METERED ORAL EVERY 4 HOURS PRN
Qty: 15 G | Refills: 0 | Status: SHIPPED | OUTPATIENT
Start: 2021-08-25 | End: 2021-11-03 | Stop reason: SDUPTHER

## 2021-10-05 ENCOUNTER — ANNUAL EXAM (OUTPATIENT)
Dept: OBGYN CLINIC | Facility: CLINIC | Age: 71
End: 2021-10-05
Payer: MEDICARE

## 2021-10-05 VITALS
WEIGHT: 165.4 LBS | SYSTOLIC BLOOD PRESSURE: 122 MMHG | BODY MASS INDEX: 29.3 KG/M2 | DIASTOLIC BLOOD PRESSURE: 80 MMHG | HEIGHT: 63 IN

## 2021-10-05 DIAGNOSIS — Z78.0 ENCOUNTER FOR OSTEOPOROSIS SCREENING IN ASYMPTOMATIC POSTMENOPAUSAL PATIENT: Primary | ICD-10-CM

## 2021-10-05 DIAGNOSIS — Z13.820 ENCOUNTER FOR OSTEOPOROSIS SCREENING IN ASYMPTOMATIC POSTMENOPAUSAL PATIENT: Primary | ICD-10-CM

## 2021-10-05 DIAGNOSIS — Z01.419 ENCOUNTER FOR WELL WOMAN EXAM WITH ROUTINE GYNECOLOGICAL EXAM: ICD-10-CM

## 2021-10-05 PROCEDURE — G0101 CA SCREEN;PELVIC/BREAST EXAM: HCPCS | Performed by: OBSTETRICS & GYNECOLOGY

## 2021-10-27 ENCOUNTER — OFFICE VISIT (OUTPATIENT)
Dept: FAMILY MEDICINE CLINIC | Facility: CLINIC | Age: 71
End: 2021-10-27
Payer: MEDICARE

## 2021-10-27 VITALS
BODY MASS INDEX: 29.41 KG/M2 | WEIGHT: 166 LBS | HEIGHT: 63 IN | HEART RATE: 87 BPM | DIASTOLIC BLOOD PRESSURE: 82 MMHG | TEMPERATURE: 95.5 F | SYSTOLIC BLOOD PRESSURE: 132 MMHG

## 2021-10-27 DIAGNOSIS — E78.2 MIXED HYPERLIPIDEMIA: ICD-10-CM

## 2021-10-27 DIAGNOSIS — Z23 ENCOUNTER FOR IMMUNIZATION: ICD-10-CM

## 2021-10-27 DIAGNOSIS — E55.9 VITAMIN D DEFICIENCY: ICD-10-CM

## 2021-10-27 DIAGNOSIS — M85.80 OSTEOPENIA, UNSPECIFIED LOCATION: ICD-10-CM

## 2021-10-27 DIAGNOSIS — I83.893 SYMPTOMATIC VARICOSE VEINS OF BOTH LOWER EXTREMITIES: Primary | ICD-10-CM

## 2021-10-27 PROCEDURE — G0008 ADMIN INFLUENZA VIRUS VAC: HCPCS | Performed by: FAMILY MEDICINE

## 2021-10-27 PROCEDURE — 99214 OFFICE O/P EST MOD 30 MIN: CPT | Performed by: FAMILY MEDICINE

## 2021-10-27 PROCEDURE — 90662 IIV NO PRSV INCREASED AG IM: CPT | Performed by: FAMILY MEDICINE

## 2021-10-28 ENCOUNTER — HOSPITAL ENCOUNTER (OUTPATIENT)
Dept: BONE DENSITY | Facility: MEDICAL CENTER | Age: 71
Discharge: HOME | End: 2021-10-28
Payer: MEDICARE

## 2021-10-28 DIAGNOSIS — Z13.820 ENCOUNTER FOR OSTEOPOROSIS SCREENING IN ASYMPTOMATIC POSTMENOPAUSAL PATIENT: ICD-10-CM

## 2021-10-28 DIAGNOSIS — Z78.0 ENCOUNTER FOR OSTEOPOROSIS SCREENING IN ASYMPTOMATIC POSTMENOPAUSAL PATIENT: ICD-10-CM

## 2021-10-28 PROCEDURE — 77080 DXA BONE DENSITY AXIAL: CPT

## 2021-11-03 DIAGNOSIS — J45.20 MILD INTERMITTENT ASTHMA, UNSPECIFIED WHETHER COMPLICATED: ICD-10-CM

## 2021-11-05 RX ORDER — LEVALBUTEROL TARTRATE 45 UG/1
1 AEROSOL, METERED ORAL EVERY 4 HOURS PRN
Qty: 15 G | Refills: 1 | Status: SHIPPED | OUTPATIENT
Start: 2021-11-05

## 2021-11-26 ENCOUNTER — HOSPITAL ENCOUNTER (OUTPATIENT)
Dept: NON INVASIVE DIAGNOSTICS | Facility: CLINIC | Age: 71
Discharge: HOME | End: 2021-11-26
Payer: MEDICARE

## 2021-11-26 DIAGNOSIS — I83.893 SYMPTOMATIC VARICOSE VEINS OF BOTH LOWER EXTREMITIES: ICD-10-CM

## 2021-11-26 PROCEDURE — 93970 EXTREMITY STUDY: CPT

## 2021-11-26 PROCEDURE — 93970 EXTREMITY STUDY: CPT | Performed by: SURGERY

## 2021-12-13 ENCOUNTER — HOSPITAL ENCOUNTER (OUTPATIENT)
Dept: MAMMOGRAPHY | Facility: MEDICAL CENTER | Age: 71
Discharge: HOME | End: 2021-12-13
Payer: MEDICARE

## 2021-12-13 VITALS — BODY MASS INDEX: 29.41 KG/M2 | HEIGHT: 63 IN | WEIGHT: 166.01 LBS

## 2021-12-13 DIAGNOSIS — Z12.31 ENCOUNTER FOR SCREENING MAMMOGRAM FOR MALIGNANT NEOPLASM OF BREAST: ICD-10-CM

## 2021-12-13 PROCEDURE — 77067 SCR MAMMO BI INCL CAD: CPT

## 2021-12-13 PROCEDURE — 77063 BREAST TOMOSYNTHESIS BI: CPT

## 2021-12-29 ENCOUNTER — CONSULT (OUTPATIENT)
Dept: VASCULAR SURGERY | Facility: CLINIC | Age: 71
End: 2021-12-29
Payer: MEDICARE

## 2021-12-29 VITALS
HEART RATE: 71 BPM | HEIGHT: 63 IN | BODY MASS INDEX: 30.02 KG/M2 | WEIGHT: 169.4 LBS | SYSTOLIC BLOOD PRESSURE: 140 MMHG | DIASTOLIC BLOOD PRESSURE: 86 MMHG

## 2021-12-29 DIAGNOSIS — I83.893 SYMPTOMATIC VARICOSE VEINS OF BOTH LOWER EXTREMITIES: ICD-10-CM

## 2021-12-29 PROBLEM — I83.93 SPIDER VEINS OF BOTH LOWER EXTREMITIES: Status: ACTIVE | Noted: 2021-12-29

## 2021-12-29 PROCEDURE — 99204 OFFICE O/P NEW MOD 45 MIN: CPT | Performed by: SURGERY

## 2022-04-13 ENCOUNTER — APPOINTMENT (OUTPATIENT)
Dept: LAB | Facility: CLINIC | Age: 72
End: 2022-04-13
Payer: MEDICARE

## 2022-04-13 DIAGNOSIS — E78.2 MIXED HYPERLIPIDEMIA: ICD-10-CM

## 2022-04-13 DIAGNOSIS — E55.9 VITAMIN D DEFICIENCY: ICD-10-CM

## 2022-04-13 LAB
25(OH)D3 SERPL-MCNC: 74.3 NG/ML (ref 30–100)
ALBUMIN SERPL BCP-MCNC: 3.7 G/DL (ref 3.5–5)
ALP SERPL-CCNC: 57 U/L (ref 46–116)
ALT SERPL W P-5'-P-CCNC: 28 U/L (ref 12–78)
ANION GAP SERPL CALCULATED.3IONS-SCNC: 5 MMOL/L (ref 4–13)
AST SERPL W P-5'-P-CCNC: 19 U/L (ref 5–45)
BILIRUB SERPL-MCNC: 0.73 MG/DL (ref 0.2–1)
BUN SERPL-MCNC: 13 MG/DL (ref 5–25)
CALCIUM SERPL-MCNC: 9.5 MG/DL (ref 8.3–10.1)
CHLORIDE SERPL-SCNC: 106 MMOL/L (ref 100–108)
CHOLEST SERPL-MCNC: 218 MG/DL
CO2 SERPL-SCNC: 30 MMOL/L (ref 21–32)
CREAT SERPL-MCNC: 0.84 MG/DL (ref 0.6–1.3)
GFR SERPL CREATININE-BSD FRML MDRD: 69 ML/MIN/1.73SQ M
GLUCOSE P FAST SERPL-MCNC: 96 MG/DL (ref 65–99)
HDLC SERPL-MCNC: 63 MG/DL
LDLC SERPL CALC-MCNC: 136 MG/DL (ref 0–100)
POTASSIUM SERPL-SCNC: 4.2 MMOL/L (ref 3.5–5.3)
PROT SERPL-MCNC: 7.1 G/DL (ref 6.4–8.2)
SODIUM SERPL-SCNC: 141 MMOL/L (ref 136–145)
T3FREE SERPL-MCNC: 2.72 PG/ML (ref 2.3–4.2)
T4 FREE SERPL-MCNC: 1.07 NG/DL (ref 0.76–1.46)
TRIGL SERPL-MCNC: 93 MG/DL
TSH SERPL DL<=0.05 MIU/L-ACNC: 2.84 UIU/ML (ref 0.45–4.5)

## 2022-04-13 PROCEDURE — 82306 VITAMIN D 25 HYDROXY: CPT

## 2022-04-13 PROCEDURE — 84439 ASSAY OF FREE THYROXINE: CPT

## 2022-04-13 PROCEDURE — 84481 FREE ASSAY (FT-3): CPT

## 2022-04-13 PROCEDURE — 36415 COLL VENOUS BLD VENIPUNCTURE: CPT

## 2022-04-13 PROCEDURE — 80053 COMPREHEN METABOLIC PANEL: CPT

## 2022-04-13 PROCEDURE — 80061 LIPID PANEL: CPT

## 2022-04-13 PROCEDURE — 84443 ASSAY THYROID STIM HORMONE: CPT

## 2022-05-02 ENCOUNTER — OFFICE VISIT (OUTPATIENT)
Dept: FAMILY MEDICINE CLINIC | Facility: CLINIC | Age: 72
End: 2022-05-02
Payer: MEDICARE

## 2022-05-02 VITALS
DIASTOLIC BLOOD PRESSURE: 78 MMHG | SYSTOLIC BLOOD PRESSURE: 128 MMHG | HEART RATE: 68 BPM | HEIGHT: 63 IN | OXYGEN SATURATION: 99 % | BODY MASS INDEX: 28.99 KG/M2 | WEIGHT: 163.6 LBS | RESPIRATION RATE: 16 BRPM | TEMPERATURE: 97.6 F

## 2022-05-02 DIAGNOSIS — D17.23 LIPOMA OF RIGHT LOWER EXTREMITY: ICD-10-CM

## 2022-05-02 DIAGNOSIS — J30.89 ALLERGIC RHINITIS DUE TO OTHER ALLERGIC TRIGGER, UNSPECIFIED SEASONALITY: ICD-10-CM

## 2022-05-02 DIAGNOSIS — E78.2 MIXED HYPERLIPIDEMIA: ICD-10-CM

## 2022-05-02 DIAGNOSIS — Z00.00 MEDICARE ANNUAL WELLNESS VISIT, SUBSEQUENT: Primary | ICD-10-CM

## 2022-05-02 PROCEDURE — G0439 PPPS, SUBSEQ VISIT: HCPCS | Performed by: FAMILY MEDICINE

## 2022-05-02 RX ORDER — FLUTICASONE PROPIONATE 50 MCG
2 SPRAY, SUSPENSION (ML) NASAL AS NEEDED
Qty: 16 G | Refills: 1 | Status: SHIPPED | OUTPATIENT
Start: 2022-05-02

## 2022-05-02 NOTE — PROGRESS NOTES
Assessment and Plan:   Ginger was seen today for medicare wellness visit and follow-up  Diagnoses and all orders for this visit:    Medicare annual wellness visit, subsequent    Mixed hyperlipidemia    Allergic rhinitis due to other allergic trigger, unspecified seasonality  -     fluticasone (FLONASE) 50 mcg/act nasal spray; 2 sprays into each nostril as needed for rhinitis or allergies    Lipoma of right lower extremity  -     Ambulatory Referral to General Surgery; Future      Problem List Items Addressed This Visit        Respiratory    Allergic rhinitis    Relevant Medications    fluticasone (FLONASE) 50 mcg/act nasal spray       Other    Mixed hyperlipidemia      Other Visit Diagnoses     Medicare annual wellness visit, subsequent    -  Primary    Lipoma of right lower extremity        Relevant Orders    Ambulatory Referral to General Surgery        BMI Counseling: Body mass index is 29 35 kg/m²  The BMI is above normal  Nutrition recommendations include decreasing portion sizes, encouraging healthy choices of fruits and vegetables, decreasing fast food intake, consuming healthier snacks, limiting drinks that contain sugar, moderation in carbohydrate intake, increasing intake of lean protein, reducing intake of saturated and trans fat and reducing intake of cholesterol  Exercise recommendations include vigorous physical activity 75 minutes/week  Patient referred to PCP  Rationale for BMI follow-up plan is due to patient being overweight or obese  Depression Screening and Follow-up Plan: Patient was screened for depression during today's encounter  They screened negative with a PHQ-2 score of 0  Preventive health issues were discussed with patient, and age appropriate screening tests were ordered as noted in patient's After Visit Summary  Personalized health advice and appropriate referrals for health education or preventive services given if needed, as noted in patient's After Visit Summary  History of Present Illness:     Patient presents for Welcome to Medicare visit  Patient Care Team:  Juan Jose Brown DO as PCP - General  Dioni Hawk MD     Review of Systems:     Review of Systems   Problem List:     Patient Active Problem List   Diagnosis    Allergic rhinitis    Arthritis    Asthma    Gastroesophageal reflux disease without esophagitis    Mixed hyperlipidemia    Osteoarthritis    Osteopenia    Rotator cuff syndrome of right shoulder    Vitamin B12 deficiency    Vitamin D deficiency    Spider veins of both lower extremities      Past Medical and Surgical History:     Past Medical History:   Diagnosis Date    Abnormal electrocardiogram     Prolonged QT    Asthma     GERD (gastroesophageal reflux disease)     Shortness of breath     Last assessed 03/12/14    Swallowing difficulty      Past Surgical History:   Procedure Laterality Date    CATARACT EXTRACTION Bilateral     COLONOSCOPY      CYST REMOVAL      ovarian    ENDOMETRIAL BIOPSY      HEMORROIDECTOMY      HYSTERECTOMY      KNEE ARTHROSCOPY W/ MENISCAL REPAIR Left 03/09/2020    AL ESOPHAGOGASTRODUODENOSCOPY TRANSORAL DIAGNOSTIC N/A 10/24/2017    Procedure: ESOPHAGOGASTRODUODENOSCOPY (EGD); Surgeon: Dioni Hawk MD;  Location: [facility];   Service: Gastroenterology    SHOULDER SURGERY Bilateral     rotator cuff    TONSILLECTOMY      TOTAL ABDOMINAL HYSTERECTOMY W/ BILATERAL SALPINGOOPHORECTOMY        Family History:     Family History   Problem Relation Age of Onset    Diabetes Mother     Hypertension Mother     Skin cancer Mother     Heart disease Family     Other Family         CVA    No Known Problems Father     Skin cancer Sister     No Known Problems Daughter     No Known Problems Maternal Grandmother     No Known Problems Maternal Grandfather     No Known Problems Paternal Grandmother     No Known Problems Paternal Grandfather     Prostate cancer Brother 77    No Known Problems Sister     No Known Problems Daughter     No Known Problems Maternal Aunt     No Known Problems Maternal Aunt     No Known Problems Maternal Aunt     No Known Problems Maternal Aunt     No Known Problems Maternal Aunt     No Known Problems Paternal Aunt       Social History:     Social History     Socioeconomic History    Marital status:      Spouse name: None    Number of children: None    Years of education: None    Highest education level: None   Occupational History    None   Tobacco Use    Smoking status: Never Smoker    Smokeless tobacco: Never Used   Substance and Sexual Activity    Alcohol use: Yes     Comment: occasionally    Drug use: No    Sexual activity: None   Other Topics Concern    None   Social History Narrative    None     Social Determinants of Health     Financial Resource Strain: Not on file   Food Insecurity: Not on file   Transportation Needs: Not on file   Physical Activity: Not on file   Stress: Not on file   Social Connections: Not on file   Intimate Partner Violence: Not on file   Housing Stability: Not on file      Medications and Allergies:     Current Outpatient Medications   Medication Sig Dispense Refill    Ascorbic Acid (vitamin C) 1000 MG tablet Take 1,000 mg by mouth daily        calcium carbonate 1250 MG capsule Take 1,250 mg by mouth 2 (two) times a day with meals        Cholecalciferol (VITAMIN D3) 5000 units TABS Take by mouth        cyanocobalamin (VITAMIN B-12) 1,000 mcg tablet Take by mouth daily        fluticasone (FLONASE) 50 mcg/act nasal spray 2 sprays into each nostril as needed for rhinitis or allergies 16 g 1    levalbuterol (Xopenex HFA) 45 mcg/act inhaler Inhale 1 puff every 4 (four) hours as needed for wheezing 15 g 1     No current facility-administered medications for this visit       Allergies   Allergen Reactions    Amitriptyline     Seasonal Ic [Cholestatin]     Montelukast       Immunizations:     Immunization History   Administered Date(s) Administered    COVID-19 PFIZER VACCINE 0 3 ML IM 03/05/2021, 03/29/2021, 09/27/2021    H1N1, All Formulations 11/04/2009    Influenza Split High Dose Preservative Free IM 10/31/2016, 09/29/2017    Influenza Whole 10/19/2019    Influenza, high dose seasonal 0 7 mL 10/24/2018, 10/13/2020, 10/27/2021    Influenza, seasonal, injectable 10/01/2015    Pneumococcal Conjugate 13-Valent 02/21/2018    Pneumococcal Polysaccharide PPV23 01/18/2017    Tdap 02/28/2019    Zoster 08/30/2016      Health Maintenance:         Topic Date Due    Hepatitis C Screening  05/02/2030 (Originally 1950)    Breast Cancer Screening: Mammogram  12/13/2022    Colorectal Cancer Screening  10/13/2025     There are no preventive care reminders to display for this patient  Medicare Screening Tests and Risk Assessments:     Jessica is here for her Subsequent Wellness visit  Health Risk Assessment:   Patient rates overall health as excellent  Patient feels that their physical health rating is much better  Patient is very satisfied with their life  Eyesight was rated as slightly worse  Hearing was rated as same  Patient feels that their emotional and mental health rating is much better  Patients states they are never, rarely angry  Patient states they are never, rarely unusually tired/fatigued  Pain experienced in the last 7 days has been some  Patient's pain rating has been 1/10  Patient states that she has experienced no weight loss or gain in last 6 months  Depression Screening:   PHQ-2 Score: 0      Fall Risk Screening: In the past year, patient has experienced: no history of falling in past year      Urinary Incontinence Screening:   Patient has not leaked urine accidently in the last six months  Home Safety:  Patient does not have trouble with stairs inside or outside of their home  Patient has working smoke alarms and has working carbon monoxide detector  Home safety hazards include: none       Nutrition: Current diet is Regular  Medications:   Patient is currently taking over-the-counter supplements  OTC medications include: see medication list  Patient is able to manage medications  Activities of Daily Living (ADLs)/Instrumental Activities of Daily Living (IADLs):   Walk and transfer into and out of bed and chair?: Yes  Dress and groom yourself?: Yes    Bathe or shower yourself?: Yes    Feed yourself? Yes  Do your laundry/housekeeping?: Yes  Manage your money, pay your bills and track your expenses?: Yes  Make your own meals?: Yes    Do your own shopping?: Yes    Previous Hospitalizations:   Any hospitalizations or ED visits within the last 12 months?: No      Advance Care Planning:   Living will: No    Durable POA for healthcare: Yes    Advanced directive: No    End of Life Decisions reviewed with patient: Yes      Cognitive Screening:   Provider or family/friend/caregiver concerned regarding cognition?: No    PREVENTIVE SCREENINGS      Cardiovascular Screening:    General: History Lipid Disorder and Screening Current      Diabetes Screening:     General: Screening Current      Colorectal Cancer Screening:     General: Screening Current      Breast Cancer Screening:     General: Screening Current      Cervical Cancer Screening:    General: Screening Not Indicated      Osteoporosis Screening:    General: Screening Current      Lung Cancer Screening:     General: Screening Not Indicated      Hepatitis C Screening:    General: Screening Current    Screening, Brief Intervention, and Referral to Treatment (SBIRT)    Screening  Typical number of drinks in a day: 0  Typical number of drinks in a week: 3  Interpretation: Low risk drinking behavior      Single Item Drug Screening:  How often have you used an illegal drug (including marijuana) or a prescription medication for non-medical reasons in the past year? never    Single Item Drug Screen Score: 0  Interpretation: Negative screen for possible drug use disorder    No exam data present     Physical Exam:     /78   Pulse 68   Temp 97 6 °F (36 4 °C) (Temporal)   Resp 16   Ht 5' 2 6" (1 59 m)   Wt 74 2 kg (163 lb 9 6 oz)   LMP  (LMP Unknown)   SpO2 99%   BMI 29 35 kg/m²     Physical Exam  Constitutional:       Appearance: Normal appearance  HENT:      Right Ear: Tympanic membrane normal       Left Ear: Tympanic membrane normal       Nose: Nose normal       Mouth/Throat:      Mouth: Mucous membranes are moist    Eyes:      Pupils: Pupils are equal, round, and reactive to light  Neck:      Vascular: No carotid bruit  Cardiovascular:      Rate and Rhythm: Normal rate  Pulmonary:      Effort: Pulmonary effort is normal    Abdominal:      General: Bowel sounds are normal       Palpations: Abdomen is soft  Musculoskeletal:         General: Normal range of motion  Neurological:      Mental Status: She is alert and oriented to person, place, and time  Psychiatric:         Mood and Affect: Mood normal          Behavior: Behavior normal          Thought Content: Thought content normal          Judgment: Judgment normal           Kelvin Lombardo,   BMI Counseling: Body mass index is 29 35 kg/m²  The BMI is above normal  Nutrition recommendations include reducing portion sizes, decreasing overall calorie intake, 3-5 servings of fruits/vegetables daily, reducing fast food intake, consuming healthier snacks, decreasing soda and/or juice intake, moderation in carbohydrate intake, increasing intake of lean protein, reducing intake of saturated fat and trans fat and reducing intake of cholesterol  Exercise recommendations include exercising 3-5 times per week

## 2022-05-02 NOTE — PATIENT INSTRUCTIONS
Medicare Preventive Visit Patient Instructions  Thank you for completing your Welcome to Medicare Visit or Medicare Annual Wellness Visit today  Your next wellness visit will be due in one year (5/3/2023)  The screening/preventive services that you may require over the next 5-10 years are detailed below  Some tests may not apply to you based off risk factors and/or age  Screening tests ordered at today's visit but not completed yet may show as past due  Also, please note that scanned in results may not display below  Preventive Screenings:  Service Recommendations Previous Testing/Comments   Colorectal Cancer Screening  * Colonoscopy    * Fecal Occult Blood Test (FOBT)/Fecal Immunochemical Test (FIT)  * Fecal DNA/Cologuard Test  * Flexible Sigmoidoscopy Age: 54-65 years old   Colonoscopy: every 10 years (may be performed more frequently if at higher risk)  OR  FOBT/FIT: every 1 year  OR  Cologuard: every 3 years  OR  Sigmoidoscopy: every 5 years  Screening may be recommended earlier than age 48 if at higher risk for colorectal cancer  Also, an individualized decision between you and your healthcare provider will decide whether screening between the ages of 74-80 would be appropriate  Colonoscopy: 10/13/2015  FOBT/FIT: Not on file  Cologuard: Not on file  Sigmoidoscopy: Not on file    Screening Current     Breast Cancer Screening Age: 36 years old  Frequency: every 1-2 years  Not required if history of left and right mastectomy Mammogram: 12/13/2021    Screening Current   Cervical Cancer Screening Between the ages of 21-29, pap smear recommended once every 3 years  Between the ages of 33-67, can perform pap smear with HPV co-testing every 5 years     Recommendations may differ for women with a history of total hysterectomy, cervical cancer, or abnormal pap smears in past  Pap Smear: 10/05/2021    Screening Not Indicated   Hepatitis C Screening Once for adults born between 1945 and 1965  More frequently in patients at high risk for Hepatitis C Hep C Antibody: Not on file    Screening Current   Diabetes Screening 1-2 times per year if you're at risk for diabetes or have pre-diabetes Fasting glucose: 96 mg/dL   A1C: No results in last 5 years    Screening Current   Cholesterol Screening Once every 5 years if you don't have a lipid disorder  May order more often based on risk factors  Lipid panel: 04/13/2022    Screening Not Indicated  History Lipid Disorder     Other Preventive Screenings Covered by Medicare:  1  Abdominal Aortic Aneurysm (AAA) Screening: covered once if your at risk  You're considered to be at risk if you have a family history of AAA  2  Lung Cancer Screening: covers low dose CT scan once per year if you meet all of the following conditions: (1) Age 50-69; (2) No signs or symptoms of lung cancer; (3) Current smoker or have quit smoking within the last 15 years; (4) You have a tobacco smoking history of at least 30 pack years (packs per day multiplied by number of years you smoked); (5) You get a written order from a healthcare provider  3  Glaucoma Screening: covered annually if you're considered high risk: (1) You have diabetes OR (2) Family history of glaucoma OR (3)  aged 48 and older OR (3)  American aged 72 and older  3  Osteoporosis Screening: covered every 2 years if you meet one of the following conditions: (1) You're estrogen deficient and at risk for osteoporosis based off medical history and other findings; (2) Have a vertebral abnormality; (3) On glucocorticoid therapy for more than 3 months; (4) Have primary hyperparathyroidism; (5) On osteoporosis medications and need to assess response to drug therapy  · Last bone density test (DXA Scan): 10/30/2021   5  HIV Screening: covered annually if you're between the age of 15-65  Also covered annually if you are younger than 13 and older than 72 with risk factors for HIV infection   For pregnant patients, it is covered up to 3 times per pregnancy  Immunizations:  Immunization Recommendations   Influenza Vaccine Annual influenza vaccination during flu season is recommended for all persons aged >= 6 months who do not have contraindications   Pneumococcal Vaccine (Prevnar and Pneumovax)  * Prevnar = PCV13  * Pneumovax = PPSV23   Adults 25-60 years old: 1-3 doses may be recommended based on certain risk factors  Adults 72 years old: Prevnar (PCV13) vaccine recommended followed by Pneumovax (PPSV23) vaccine  If already received PPSV23 since turning 65, then PCV13 recommended at least one year after PPSV23 dose  Hepatitis B Vaccine 3 dose series if at intermediate or high risk (ex: diabetes, end stage renal disease, liver disease)   Tetanus (Td) Vaccine - COST NOT COVERED BY MEDICARE PART B Following completion of primary series, a booster dose should be given every 10 years to maintain immunity against tetanus  Td may also be given as tetanus wound prophylaxis  Tdap Vaccine - COST NOT COVERED BY MEDICARE PART B Recommended at least once for all adults  For pregnant patients, recommended with each pregnancy  Shingles Vaccine (Shingrix) - COST NOT COVERED BY MEDICARE PART B  2 shot series recommended in those aged 48 and above     Health Maintenance Due:      Topic Date Due    Hepatitis C Screening  05/02/2030 (Originally 1950)    Breast Cancer Screening: Mammogram  12/13/2022    Colorectal Cancer Screening  10/13/2025     Immunizations Due:  There are no preventive care reminders to display for this patient  Advance Directives   What are advance directives? Advance directives are legal documents that state your wishes and plans for medical care  These plans are made ahead of time in case you lose your ability to make decisions for yourself  Advance directives can apply to any medical decision, such as the treatments you want, and if you want to donate organs  What are the types of advance directives?   There are many types of advance directives, and each state has rules about how to use them  You may choose a combination of any of the following:  · Living will: This is a written record of the treatment you want  You can also choose which treatments you do not want, which to limit, and which to stop at a certain time  This includes surgery, medicine, IV fluid, and tube feedings  · Durable power of  for healthcare [facility] [place]): This is a written record that states who you want to make healthcare choices for you when you are unable to make them for yourself  This person, called a proxy, is usually a family member or a friend  You may choose more than 1 proxy  · Do not resuscitate (DNR) order:  A DNR order is used in case your heart stops beating or you stop breathing  It is a request not to have certain forms of treatment, such as CPR  A DNR order may be included in other types of advance directives  · Medical directive: This covers the care that you want if you are in a coma, near death, or unable to make decisions for yourself  You can list the treatments you want for each condition  Treatment may include pain medicine, surgery, blood transfusions, dialysis, IV or tube feedings, and a ventilator (breathing machine)  · Values history: This document has questions about your views, beliefs, and how you feel and think about life  This information can help others choose the care that you would choose  Why are advance directives important? An advance directive helps you control your care  Although spoken wishes may be used, it is better to have your wishes written down  Spoken wishes can be misunderstood, or not followed  Treatments may be given even if you do not want them  An advance directive may make it easier for your family to make difficult choices about your care     Weight Management   Why it is important to manage your weight:  Being overweight increases your risk of health conditions such as heart disease, high blood pressure, type 2 diabetes, and certain types of cancer  It can also increase your risk for osteoarthritis, sleep apnea, and other respiratory problems  Aim for a slow, steady weight loss  Even a small amount of weight loss can lower your risk of health problems  How to lose weight safely:  A safe and healthy way to lose weight is to eat fewer calories and get regular exercise  You can lose up about 1 pound a week by decreasing the number of calories you eat by 500 calories each day  Healthy meal plan for weight management:  A healthy meal plan includes a variety of foods, contains fewer calories, and helps you stay healthy  A healthy meal plan includes the following:  · Eat whole-grain foods more often  A healthy meal plan should contain fiber  Fiber is the part of grains, fruits, and vegetables that is not broken down by your body  Whole-grain foods are healthy and provide extra fiber in your diet  Some examples of whole-grain foods are whole-wheat breads and pastas, oatmeal, brown rice, and bulgur  · Eat a variety of vegetables every day  Include dark, leafy greens such as spinach, kale, leena greens, and mustard greens  Eat yellow and orange vegetables such as carrots, sweet potatoes, and winter squash  · Eat a variety of fruits every day  Choose fresh or canned fruit (canned in its own juice or light syrup) instead of juice  Fruit juice has very little or no fiber  · Eat low-fat dairy foods  Drink fat-free (skim) milk or 1% milk  Eat fat-free yogurt and low-fat cottage cheese  Try low-fat cheeses such as mozzarella and other reduced-fat cheeses  · Choose meat and other protein foods that are low in fat  Choose beans or other legumes such as split peas or lentils  Choose fish, skinless poultry (chicken or turkey), or lean cuts of red meat (beef or pork)  Before you cook meat or poultry, cut off any visible fat  · Use less fat and oil  Try baking foods instead of frying them   Add less fat, such as margarine, sour cream, regular salad dressing and mayonnaise to foods  Eat fewer high-fat foods  Some examples of high-fat foods include french fries, doughnuts, ice cream, and cakes  · Eat fewer sweets  Limit foods and drinks that are high in sugar  This includes candy, cookies, regular soda, and sweetened drinks  Exercise:  Exercise at least 30 minutes per day on most days of the week  Some examples of exercise include walking, biking, dancing, and swimming  You can also fit in more physical activity by taking the stairs instead of the elevator or parking farther away from stores  Ask your healthcare provider about the best exercise plan for you  © Copyright [employer] 2018 Information is for End User's use only and may not be sold, redistributed or otherwise used for commercial purposes   All illustrations and images included in CareNotes® are the copyrighted property of [employer]  or [address] [place] [address]

## 2022-05-09 ENCOUNTER — CONSULT (OUTPATIENT)
Dept: SURGERY | Facility: CLINIC | Age: 72
End: 2022-05-09
Payer: MEDICARE

## 2022-05-09 VITALS
BODY MASS INDEX: 29.2 KG/M2 | HEART RATE: 56 BPM | DIASTOLIC BLOOD PRESSURE: 80 MMHG | SYSTOLIC BLOOD PRESSURE: 122 MMHG | TEMPERATURE: 97.4 F | HEIGHT: 63 IN | WEIGHT: 164.8 LBS

## 2022-05-09 DIAGNOSIS — D17.23 LIPOMA OF RIGHT LOWER EXTREMITY: ICD-10-CM

## 2022-05-09 DIAGNOSIS — D17.24 LIPOMA OF LEFT LOWER EXTREMITY: Primary | ICD-10-CM

## 2022-05-09 PROCEDURE — 99203 OFFICE O/P NEW LOW 30 MIN: CPT | Performed by: PHYSICIAN ASSISTANT

## 2022-05-09 NOTE — PROGRESS NOTES
Assessment/Plan:   Jessica Ricci is a 66 y  o female who is here for   Chief Complaint   Patient presents with    Lipoma     B/L thighs  3 on the right thigh and 1 on the left thigh  Becoming painful  Present for a few years  On exam found to have Lipoma ( x 3) of the : right lower leg  Plan: Excise lesion(s) under anesthesia in the operating room    Positioning: supine    Post Op Pain Management:   Norco    - Patient has been instructed to avoid herbs or non-directed vitamins the week prior to surgery to ensure no drug interactions with perioperative surgical and anesthetic medications  - Patient should continue beta-blocker medication up through and including the day of surgery but hold any other hypertensive medications, including diuretics, unless instructed by PCP or anesthesia  - Patient should continue his statin medication up through and including the day of surgery   - Hold metformin , If on this medication, the morning of surgery and do not resume until 48 hours AFTER surgery to avoid risk of lactic acidosis  Do not resume if eGFR is < 30  - Insulin Management:If on Insulin, patient advised to call PCP for explicit instructions  In general, will need to take one-half normal dose am of surgery but pt advised to consult PCP before making any changes  - Patient has been instructed to avoid aspirin containing medications or non-steroidal anti-inflammatory drugs for SEVEN days preceding surgery  Preoperative Clearance: None          _______________________________________________________  CC:Lipoma (B/L thighs  3 on the right thigh and 1 on the left thigh  Becoming painful  Present for a few years  )    HPI:  Tonijoni Jyothilaramirez is a 66 y  o female who was referred for evaluation of Lipoma (B/L thighs  3 on the right thigh and 1 on the left thigh  Becoming painful  Present for a few years  )        Currently patient reports she has three painful lipomas on her right lower extremity that has been there for 3+ years  States they have become slightly smaller since losing weight  She finds they are most painful in the morning before she starts moving  She states she is getting a small lipoma on her left anterior thigh that is not painful, but at times is sore  ROS:  General ROS: negative  negative for - chills, fatigue, fever or night sweats, weight loss  Respiratory ROS: no cough, shortness of breath, or wheezing  Cardiovascular ROS: no chest pain or dyspnea on exertion  Genito-Urinary ROS: no dysuria, trouble voiding, or hematuria  Musculoskeletal ROS: negative for - gait disturbance, joint pain or muscle pain  Neurological ROS: no TIA or stroke symptoms  Skin ROS: See HPI  GI ROS: see HPI  Skin ROS: no new rashes or lesions   Lymphatic ROS: no new adenopathy noted by pt     Psy ROS: no new mental or behavioral disturbances       Patient Active Problem List   Diagnosis    Allergic rhinitis    Arthritis    Asthma    Gastroesophageal reflux disease without esophagitis    Mixed hyperlipidemia    Osteoarthritis    Osteopenia    Rotator cuff syndrome of right shoulder    Vitamin B12 deficiency    Vitamin D deficiency    Spider veins of both lower extremities         Allergies:  Amitriptyline, Seasonal ic [cholestatin], and Montelukast      Current Outpatient Medications:     Ascorbic Acid (vitamin C) 1000 MG tablet, Take 1,000 mg by mouth daily  , Disp: , Rfl:     bimatoprost (LUMIGAN) 0 01 % ophthalmic drops, Administer 1 drop to both eyes daily at bedtime, Disp: , Rfl:     calcium carbonate 1250 MG capsule, Take 1,250 mg by mouth 2 (two) times a day with meals  , Disp: , Rfl:     Cholecalciferol (VITAMIN D3) 5000 units TABS, Take by mouth  , Disp: , Rfl:     cyanocobalamin (VITAMIN B-12) 1,000 mcg tablet, Take by mouth daily  , Disp: , Rfl:     fluticasone (FLONASE) 50 mcg/act nasal spray, 2 sprays into each nostril as needed for rhinitis or allergies, Disp: 16 g, Rfl: 1    levalbuterol (Xopenex HFA) 45 mcg/act inhaler, Inhale 1 puff every 4 (four) hours as needed for wheezing, Disp: 15 g, Rfl: 1    Past Medical History:   Diagnosis Date    Abnormal electrocardiogram     Prolonged QT    Asthma     GERD (gastroesophageal reflux disease)     Glaucoma     Shortness of breath     Last assessed 03/12/14    Swallowing difficulty        Past Surgical History:   Procedure Laterality Date    CATARACT EXTRACTION Bilateral     COLONOSCOPY      CYST REMOVAL      ovarian    ENDOMETRIAL BIOPSY      HEMORROIDECTOMY      HYSTERECTOMY      KNEE ARTHROSCOPY W/ MENISCAL REPAIR Left 03/09/2020    [place] ESOPHAGOGASTRODUODENOSCOPY TRANSORAL DIAGNOSTIC N/A 10/24/2017    Procedure: ESOPHAGOGASTRODUODENOSCOPY (EGD); Surgeon: Gene Pinto MD;  Location: [facility]; Service: Gastroenterology    SHOULDER SURGERY Bilateral     rotator cuff    TONSILLECTOMY      TOTAL ABDOMINAL HYSTERECTOMY W/ BILATERAL SALPINGOOPHORECTOMY         Family History   Problem Relation Age of Onset    Diabetes Mother     Hypertension Mother     Skin cancer Mother     Heart disease Family     Other Family         CVA    No Known Problems Father     Skin cancer Sister     No Known Problems Daughter     No Known Problems Maternal Grandmother     No Known Problems Maternal Grandfather     No Known Problems Paternal Grandmother     No Known Problems Paternal Grandfather     Prostate cancer Brother 77    No Known Problems Sister     No Known Problems Daughter     No Known Problems Maternal Aunt     No Known Problems Maternal Aunt     No Known Problems Maternal Aunt     No Known Problems Maternal Aunt     No Known Problems Maternal Aunt     No Known Problems Paternal Aunt         reports that she has never smoked  She has never used smokeless tobacco  She reports current alcohol use  She reports that she does not use drugs      Vitals:    05/09/22 0820   BP: 122/80   Pulse: 56   Temp: (!) 97 4 °F (36 3 °C) PHYSICAL EXAM  General Appearance:    Alert, cooperative, no distress,    Head:    Normocephalic without obvious abnormality   Eyes:    PERRL, conjunctiva/corneas clear     Neck:   Supple, no adenopathy, no JVD   Back:     Symmetric, no spinal or CVA tenderness   Lungs:     Clear to auscultation bilaterally, no wheezing or rhonchi   Heart:    Regular rate and rhythm, S1 and S2 normal, no murmur   Abdomen:        Extremities:   Extremities normal  No clubbing, cyanosis or edema   Psych:   Normal Affect, AOx3  Neurologic:  Skin:   CNII-XII intact  Strength symmetric, speech intact    Warm, dry, intact, no visible rashes or lesions except as follows: There are three small mobile masses on her right anterior upper thigh that are non-tender  Will need to be found and marked at time of surgery as they are small  Could not feel the left anterior thigh  If can find at the time of surgery will remove if not we can not remove                   Marcelene So SWETHA  Date: 5/9/2022 Time: 8:25 AM

## 2022-11-17 ENCOUNTER — OFFICE VISIT (OUTPATIENT)
Dept: FAMILY MEDICINE CLINIC | Facility: CLINIC | Age: 72
End: 2022-11-17

## 2022-11-17 VITALS
HEART RATE: 96 BPM | HEIGHT: 62 IN | OXYGEN SATURATION: 98 % | RESPIRATION RATE: 16 BRPM | TEMPERATURE: 97.3 F | DIASTOLIC BLOOD PRESSURE: 68 MMHG | SYSTOLIC BLOOD PRESSURE: 128 MMHG | BODY MASS INDEX: 30.14 KG/M2

## 2022-11-17 DIAGNOSIS — Z23 ENCOUNTER FOR IMMUNIZATION: ICD-10-CM

## 2022-11-17 DIAGNOSIS — E53.8 VITAMIN B12 DEFICIENCY: ICD-10-CM

## 2022-11-17 DIAGNOSIS — Z12.31 ENCOUNTER FOR SCREENING MAMMOGRAM FOR BREAST CANCER: ICD-10-CM

## 2022-11-17 DIAGNOSIS — E55.9 VITAMIN D DEFICIENCY: ICD-10-CM

## 2022-11-17 DIAGNOSIS — E78.2 MIXED HYPERLIPIDEMIA: ICD-10-CM

## 2022-11-17 DIAGNOSIS — J45.20 MILD INTERMITTENT ASTHMA, UNSPECIFIED WHETHER COMPLICATED: Primary | ICD-10-CM

## 2022-11-17 RX ORDER — BUDESONIDE, GLYCOPYRROLATE, AND FORMOTEROL FUMARATE 160; 9; 4.8 UG/1; UG/1; UG/1
2 AEROSOL, METERED RESPIRATORY (INHALATION) DAILY
Qty: 10.7 G | Refills: 0 | Status: SHIPPED | COMMUNITY
Start: 2022-11-17

## 2022-11-17 NOTE — PROGRESS NOTES
Name: Robert Alberto      : [id number]      MRN: [id number]  Encounter Provider: Bethany Carpio DO  Encounter Date: 2022   Encounter department: [facility] PRIMARY CARE    Assessment & Plan     1  Mild intermittent asthma, unspecified whether complicated  -     Budeson-Glycopyrrol-Formoterol (Breztri Aerosphere) 160-9-4 8 MCG/ACT AERO; Inhale 2 puffs in the morning Rinse mouth after use  2  Encounter for screening mammogram for breast cancer  -     Mammo screening bilateral w 3d & cad; Future; Expected date: 2022    3  Encounter for immunization  -     influenza vaccine, high-dose, PF 0 7 mL (FLUZONE HIGH-DOSE)    4  Mixed hyperlipidemia  -     TSH, 3rd generation; Future; Expected date: 2023  -     Comprehensive metabolic panel; Future; Expected date: 2023  -     Lipid panel; Future; Expected date: 2023    5  Vitamin B12 deficiency  -     Vitamin B12; Future; Expected date: 2023    6  Vitamin D deficiency  -     Vitamin D 25 hydroxy; Future; Expected date: 2023           Subjective     Here for 6 month   Had reaction to BIVALENT     Chief Complaint   Patient presents with   • Follow-up     6 month check pt would like flu shot  Pt would like to discuss Levalbuterol inhaler which is not working that used to work before  Review of Systems   Constitutional: Unexpected weight change: losing weight trying  Has a new   HENT: Positive for postnasal drip (clear, used allegra)  Respiratory: Cough: humidity, pollen  Shortness of breath: extra breaths etc, denies "attack"    Gastrointestinal: Negative  Genitourinary: Negative for dysuria     Singulair makes her worse    Past Medical History:   Diagnosis Date   • Abnormal electrocardiogram     Prolonged QT   • Asthma    • GERD (gastroesophageal reflux disease)    • Glaucoma    • Shortness of breath     Last assessed 14   • Swallowing difficulty      Past Surgical History:   Procedure Laterality Date   • CATARACT EXTRACTION Bilateral    • COLONOSCOPY     • CYST REMOVAL      ovarian   • ENDOMETRIAL BIOPSY     • HEMORROIDECTOMY     • HYSTERECTOMY     • KNEE ARTHROSCOPY W/ MENISCAL REPAIR Left 03/09/2020   • [place] ESOPHAGOGASTRODUODENOSCOPY TRANSORAL DIAGNOSTIC N/A 10/24/2017    Procedure: ESOPHAGOGASTRODUODENOSCOPY (EGD); Surgeon: Quita Mayfield MD;  Location: [facility];   Service: Gastroenterology   • SHOULDER SURGERY Bilateral     rotator cuff   • TONSILLECTOMY     • TOTAL ABDOMINAL HYSTERECTOMY W/ BILATERAL SALPINGOOPHORECTOMY       Family History   Problem Relation Age of Onset   • Diabetes Mother    • Hypertension Mother    • Skin cancer Mother    • Heart disease Family    • Other Family         CVA   • No Known Problems Father    • Skin cancer Sister    • No Known Problems Daughter    • No Known Problems Maternal Grandmother    • No Known Problems Maternal Grandfather    • No Known Problems Paternal Grandmother    • No Known Problems Paternal Grandfather    • Prostate cancer Brother 77   • No Known Problems Sister    • No Known Problems Daughter    • No Known Problems Maternal Aunt    • No Known Problems Maternal Aunt    • No Known Problems Maternal Aunt    • No Known Problems Maternal Aunt    • No Known Problems Maternal Aunt    • No Known Problems Paternal Aunt      Social History     Socioeconomic History   • Marital status:      Spouse name: None   • Number of children: None   • Years of education: None   • Highest education level: None   Occupational History   • None   Tobacco Use   • Smoking status: Never   • Smokeless tobacco: Never   Vaping Use   • Vaping Use: Never used   Substance and Sexual Activity   • Alcohol use: Yes     Comment: occasionally   • Drug use: No   • Sexual activity: None   Other Topics Concern   • None   Social History Narrative   • None     Social Determinants of Health     Financial Resource Strain: Not on file   Food Insecurity: Not on file   Transportation Needs: Not on file   Physical Activity: Not on file   Stress: Not on file   Social Connections: Not on file   Intimate Partner Violence: Not on file   Housing Stability: Not on file     Current Outpatient Medications on File Prior to Visit   Medication Sig   • Ascorbic Acid (vitamin C) 1000 MG tablet Take 1,000 mg by mouth daily     • bimatoprost (LUMIGAN) 0 01 % ophthalmic drops Administer 1 drop to both eyes daily at bedtime   • calcium carbonate 1250 MG capsule Take 1,250 mg by mouth 2 (two) times a day with meals     • Cholecalciferol (VITAMIN D3) 5000 units TABS Take by mouth     • cyanocobalamin (VITAMIN B-12) 1,000 mcg tablet Take by mouth daily     • fluticasone (FLONASE) 50 mcg/act nasal spray 2 sprays into each nostril as needed for rhinitis or allergies   • levalbuterol (Xopenex HFA) 45 mcg/act inhaler Inhale 1 puff every 4 (four) hours as needed for wheezing     Allergies   Allergen Reactions   • Amitriptyline    • Seasonal Ic [Cholestatin]    • Montelukast      Immunization History   Administered Date(s) Administered   • COVID-19 PFIZER VACCINE 0 3 ML IM 03/05/2021, 03/29/2021, 09/27/2021   • COVID-19 Pfizer vac (Serjio-sucrose, gray cap) 12 yr+ IM 10/01/2022   • H1N1, All Formulations 11/04/2009   • INFLUENZA 10/27/2021   • Influenza Split High Dose Preservative Free IM 10/31/2016, 09/29/2017   • Influenza Whole 10/19/2019   • Influenza, high dose seasonal 0 7 mL 10/24/2018, 10/13/2020, 10/27/2021, 11/17/2022   • Influenza, seasonal, injectable 10/01/2015   • Pneumococcal Conjugate 13-Valent 02/21/2018   • Pneumococcal Polysaccharide PPV23 01/18/2017   • Tdap 02/28/2019   • Zoster 08/30/2016       Objective     /68   Pulse 96   Temp (!) 97 3 °F (36 3 °C) (Temporal)   Resp 16   Ht 5' 2" (1 575 m)   LMP  (LMP Unknown)   SpO2 98%   BMI 30 14 kg/m²     Physical Exam  Constitutional:       General: She is not in acute distress  Appearance: She is well-developed and well-nourished     HENT:      Head: Normocephalic  Eyes:      Extraocular Movements: EOM normal       Conjunctiva/sclera: Conjunctivae normal       Pupils: Pupils are equal, round, and reactive to light  Cardiovascular:      Rate and Rhythm: Normal rate and regular rhythm  Heart sounds: No murmur heard  Pulmonary:      Effort: Pulmonary effort is normal       Breath sounds: Normal breath sounds  Abdominal:      General: Bowel sounds are normal       Palpations: Abdomen is soft  There is no mass  Tenderness: There is no abdominal tenderness  Musculoskeletal:         General: No edema  Normal range of motion  Cervical back: Normal range of motion and neck supple  Skin:     General: Skin is warm and dry  Neurological:      Mental Status: She is alert and oriented to person, place, and time  Deep Tendon Reflexes: Reflexes are normal and symmetric  Psychiatric:         Mood and Affect: Mood and affect normal          Behavior: Behavior normal          Thought Content:  Thought content normal        Mila Perrin DO

## 2022-11-22 DIAGNOSIS — J45.20 MILD INTERMITTENT ASTHMA, UNSPECIFIED WHETHER COMPLICATED: ICD-10-CM

## 2022-11-22 RX ORDER — LEVALBUTEROL TARTRATE 45 UG/1
1 AEROSOL, METERED ORAL EVERY 4 HOURS PRN
Qty: 15 G | Refills: 0 | Status: SHIPPED | OUTPATIENT
Start: 2022-11-22

## 2022-12-05 DIAGNOSIS — J45.20 MILD INTERMITTENT ASTHMA, UNSPECIFIED WHETHER COMPLICATED: ICD-10-CM

## 2022-12-05 RX ORDER — BUDESONIDE, GLYCOPYRROLATE, AND FORMOTEROL FUMARATE 160; 9; 4.8 UG/1; UG/1; UG/1
2 AEROSOL, METERED RESPIRATORY (INHALATION) DAILY
Qty: 10.7 G | Refills: 0 | Status: SHIPPED | OUTPATIENT
Start: 2022-12-05

## 2022-12-27 ENCOUNTER — HOSPITAL ENCOUNTER (OUTPATIENT)
Dept: MAMMOGRAPHY | Facility: MEDICAL CENTER | Age: 72
Discharge: HOME | End: 2022-12-27

## 2022-12-27 VITALS — HEIGHT: 62 IN | BODY MASS INDEX: 28.16 KG/M2 | WEIGHT: 153 LBS

## 2022-12-27 DIAGNOSIS — Z12.31 ENCOUNTER FOR SCREENING MAMMOGRAM FOR BREAST CANCER: ICD-10-CM

## 2023-01-03 DIAGNOSIS — J45.20 MILD INTERMITTENT ASTHMA, UNSPECIFIED WHETHER COMPLICATED: ICD-10-CM

## 2023-05-12 ENCOUNTER — APPOINTMENT (OUTPATIENT)
Dept: LAB | Facility: CLINIC | Age: 73
End: 2023-05-12

## 2023-05-12 DIAGNOSIS — E53.8 VITAMIN B12 DEFICIENCY: ICD-10-CM

## 2023-05-12 DIAGNOSIS — E78.2 MIXED HYPERLIPIDEMIA: ICD-10-CM

## 2023-05-12 DIAGNOSIS — E55.9 VITAMIN D DEFICIENCY: ICD-10-CM

## 2023-05-12 LAB
25(OH)D3 SERPL-MCNC: 70.2 NG/ML (ref 30–100)
ALBUMIN SERPL BCP-MCNC: 3.4 G/DL (ref 3.5–5)
ALP SERPL-CCNC: 48 U/L (ref 46–116)
ALT SERPL W P-5'-P-CCNC: 23 U/L (ref 12–78)
ANION GAP SERPL CALCULATED.3IONS-SCNC: 3 MMOL/L (ref 4–13)
AST SERPL W P-5'-P-CCNC: 14 U/L (ref 5–45)
BILIRUB SERPL-MCNC: 0.67 MG/DL (ref 0.2–1)
BUN SERPL-MCNC: 14 MG/DL (ref 5–25)
CALCIUM ALBUM COR SERPL-MCNC: 9.4 MG/DL (ref 8.3–10.1)
CALCIUM SERPL-MCNC: 8.9 MG/DL (ref 8.3–10.1)
CHLORIDE SERPL-SCNC: 108 MMOL/L (ref 96–108)
CHOLEST SERPL-MCNC: 222 MG/DL
CO2 SERPL-SCNC: 27 MMOL/L (ref 21–32)
CREAT SERPL-MCNC: 0.67 MG/DL (ref 0.6–1.3)
GFR SERPL CREATININE-BSD FRML MDRD: 86 ML/MIN/1.73SQ M
GLUCOSE P FAST SERPL-MCNC: 92 MG/DL (ref 65–99)
HDLC SERPL-MCNC: 71 MG/DL
LDLC SERPL CALC-MCNC: 136 MG/DL (ref 0–100)
NONHDLC SERPL-MCNC: 151 MG/DL
POTASSIUM SERPL-SCNC: 3.6 MMOL/L (ref 3.5–5.3)
PROT SERPL-MCNC: 6.7 G/DL (ref 6.4–8.4)
SODIUM SERPL-SCNC: 138 MMOL/L (ref 135–147)
TRIGL SERPL-MCNC: 77 MG/DL
TSH SERPL DL<=0.05 MIU/L-ACNC: 1.56 UIU/ML (ref 0.45–4.5)
VIT B12 SERPL-MCNC: 715 PG/ML (ref 100–900)

## 2023-05-16 ENCOUNTER — OFFICE VISIT (OUTPATIENT)
Dept: FAMILY MEDICINE CLINIC | Facility: CLINIC | Age: 73
End: 2023-05-16

## 2023-05-16 VITALS
DIASTOLIC BLOOD PRESSURE: 78 MMHG | TEMPERATURE: 97.2 F | SYSTOLIC BLOOD PRESSURE: 138 MMHG | OXYGEN SATURATION: 99 % | HEIGHT: 63 IN | WEIGHT: 159.4 LBS | HEART RATE: 72 BPM | BODY MASS INDEX: 28.24 KG/M2

## 2023-05-16 DIAGNOSIS — Z00.00 MEDICARE ANNUAL WELLNESS VISIT, SUBSEQUENT: Primary | ICD-10-CM

## 2023-05-16 DIAGNOSIS — Z78.0 ENCOUNTER FOR OSTEOPOROSIS SCREENING IN ASYMPTOMATIC POSTMENOPAUSAL PATIENT: ICD-10-CM

## 2023-05-16 DIAGNOSIS — Z13.820 ENCOUNTER FOR OSTEOPOROSIS SCREENING IN ASYMPTOMATIC POSTMENOPAUSAL PATIENT: ICD-10-CM

## 2023-05-16 NOTE — PROGRESS NOTES
Assessment and Plan:   Jessica was seen today for medicare wellness visit  Diagnoses and all orders for this visit:    Medicare annual wellness visit, subsequent    Encounter for osteoporosis screening in asymptomatic postmenopausal patient  -     DXA bone density spine hip and pelvis; Future      Problem List Items Addressed This Visit    None  Visit Diagnoses     Medicare annual wellness visit, subsequent    -  Primary    Encounter for osteoporosis screening in asymptomatic postmenopausal patient        Relevant Orders    DXA bone density spine hip and pelvis        BMI Counseling: Body mass index is 28 24 kg/m²  The BMI is above normal  Nutrition recommendations include decreasing portion sizes, encouraging healthy choices of fruits and vegetables, decreasing fast food intake, consuming healthier snacks, limiting drinks that contain sugar, moderation in carbohydrate intake, increasing intake of lean protein, reducing intake of saturated and trans fat and reducing intake of cholesterol  Exercise recommendations include exercising 3-5 times per week  Patient referred to PCP  Rationale for BMI follow-up plan is due to patient being overweight or obese  Depression Screening and Follow-up Plan: Patient was screened for depression during today's encounter  They screened negative with a PHQ-2 score of 0  Preventive health issues were discussed with patient, and age appropriate screening tests were ordered as noted in patient's After Visit Summary  Personalized health advice and appropriate referrals for health education or preventive services given if needed, as noted in patient's After Visit Summary       History of Present Illness:     Patient presents for a Medicare Wellness Visit    Here for wellness   Sees eye  Sees dental  Sees DERM  Patient Care Team:  Amilcar Burrell DO as PCP - General Carl Patel MD     Review of Systems:     Component      Latest Ref Rng & Units 5/12/2023   Sodium      135 - 147 mmol/L 138   Potassium      3 5 - 5 3 mmol/L 3 6   Chloride      96 - 108 mmol/L 108   CO2      21 - 32 mmol/L 27   Anion Gap      4 - 13 mmol/L 3 (L)   BUN      5 - 25 mg/dL 14   Creatinine      0 60 - 1 30 mg/dL 0 67   GLUCOSE FASTING      65 - 99 mg/dL 92   Calcium      8 3 - 10 1 mg/dL 8 9   CORRECTED CALCIUM      8 3 - 10 1 mg/dL 9 4   AST      5 - 45 U/L 14   ALT      12 - 78 U/L 23   Alkaline Phosphatase      46 - 116 U/L 48   Total Protein      6 4 - 8 4 g/dL 6 7   Albumin      3 5 - 5 0 g/dL 3 4 (L)   TOTAL BILIRUBIN      0 20 - 1 00 mg/dL 0 67   eGFR      ml/min/1 73sq m 86   Cholesterol      See Comment mg/dL 222 (H)   Triglycerides      See Comment mg/dL 77   HDL      >=50 mg/dL 71   LDL Calculated      0 - 100 mg/dL 136 (H)   Non-HDL Cholesterol      mg/dl 151   Vit D, 25-Hydroxy      30 0 - 100 0 ng/mL 70 2   Vitamin B-12      100 - 900 pg/mL 715   TSH 3RD GENERATON      0 450 - 4 500 uIU/mL 1 560     Review of Systems   HENT:        Dry throat with allergies   Gastrointestinal: Negative  Genitourinary:        Bladder ok   Musculoskeletal: Negative  Better with dog being trained   Psychiatric/Behavioral: Negative for sleep disturbance  All other systems reviewed and are negative         Problem List:     Patient Active Problem List   Diagnosis   • Allergic rhinitis   • Arthritis   • Asthma   • Gastroesophageal reflux disease without esophagitis   • Mixed hyperlipidemia   • Osteoarthritis   • Osteopenia   • Rotator cuff syndrome of right shoulder   • Vitamin B12 deficiency   • Vitamin D deficiency   • Spider veins of both lower extremities      Past Medical and Surgical History:     Past Medical History:   Diagnosis Date   • Abnormal electrocardiogram     Prolonged QT   • Asthma    • GERD (gastroesophageal reflux disease)    • Glaucoma    • Shortness of breath     Last assessed 03/12/14   • Swallowing difficulty      Past Surgical History:   Procedure Laterality Date   • CATARACT EXTRACTION Bilateral    • COLONOSCOPY     • CYST REMOVAL      ovarian   • ENDOMETRIAL BIOPSY     • HEMORROIDECTOMY     • HYSTERECTOMY     • KNEE ARTHROSCOPY W/ MENISCAL REPAIR Left 03/09/2020   • AK ESOPHAGOGASTRODUODENOSCOPY TRANSORAL DIAGNOSTIC N/A 10/24/2017    Procedure: ESOPHAGOGASTRODUODENOSCOPY (EGD); Surgeon: Paul Quan MD;  Location: [facility];   Service: Gastroenterology   • SHOULDER SURGERY Bilateral     rotator cuff   • TONSILLECTOMY     • TOTAL ABDOMINAL HYSTERECTOMY W/ BILATERAL SALPINGOOPHORECTOMY        Family History:     Family History   Problem Relation Age of Onset   • Diabetes Mother    • Hypertension Mother    • Skin cancer Mother    • Heart disease Family    • Other Family         CVA   • No Known Problems Father    • Skin cancer Sister    • No Known Problems Daughter    • No Known Problems Maternal Grandmother    • No Known Problems Maternal Grandfather    • No Known Problems Paternal Grandmother    • No Known Problems Paternal Grandfather    • Prostate cancer Brother 77   • No Known Problems Sister    • No Known Problems Daughter    • No Known Problems Maternal Aunt    • No Known Problems Maternal Aunt    • No Known Problems Maternal Aunt    • No Known Problems Maternal Aunt    • No Known Problems Maternal Aunt    • No Known Problems Paternal Aunt       Social History:     Social History     Socioeconomic History   • Marital status:      Spouse name: None   • Number of children: None   • Years of education: None   • Highest education level: None   Occupational History   • None   Tobacco Use   • Smoking status: Never   • Smokeless tobacco: Never   Vaping Use   • Vaping Use: Never used   Substance and Sexual Activity   • Alcohol use: Yes     Comment: occasionally   • Drug use: No   • Sexual activity: None   Other Topics Concern   • None   Social History Narrative   • None     Social Determinants of Health     Financial Resource Strain: Low Risk    • Difficulty of Paying Living Expenses: Not hard at all   Food Insecurity: Not on file   Transportation Needs: No Transportation Needs   • Lack of Transportation (Medical): No   • Lack of Transportation (Non-Medical): No   Physical Activity: Not on file   Stress: Not on file   Social Connections: Not on file   Intimate Partner Violence: Not on file   Housing Stability: Not on file      Medications and Allergies:     Current Outpatient Medications   Medication Sig Dispense Refill   • Ascorbic Acid (vitamin C) 1000 MG tablet Take 1,000 mg by mouth daily       • bimatoprost (LUMIGAN) 0 01 % ophthalmic drops Administer 1 drop to both eyes daily at bedtime     • BIOTIN PO Take by mouth     • calcium carbonate 1250 MG capsule Take 1,250 mg by mouth 2 (two) times a day with meals       • Cholecalciferol (VITAMIN D3) 5000 units TABS Take by mouth       • cyanocobalamin (VITAMIN B-12) 1,000 mcg tablet Take by mouth daily       • fluticasone (FLONASE) 50 mcg/act nasal spray 2 sprays into each nostril as needed for rhinitis or allergies 16 g 1   • levalbuterol (Xopenex HFA) 45 mcg/act inhaler Inhale 1 puff every 4 (four) hours as needed for wheezing 15 g 0     No current facility-administered medications for this visit       Allergies   Allergen Reactions   • Amitriptyline    • Seasonal Ic [Cholestatin]    • Montelukast       Immunizations:     Immunization History   Administered Date(s) Administered   • COVID-19 PFIZER VACCINE 0 3 ML IM 03/05/2021, 03/29/2021, 09/27/2021   • COVID-19 Pfizer vac (Serjio-sucrose, gray cap) 12 yr+ IM 10/01/2022   • H1N1, All Formulations 11/04/2009   • INFLUENZA 10/27/2021   • Influenza Split High Dose Preservative Free IM 10/31/2016, 09/29/2017   • Influenza Whole 10/19/2019   • Influenza, high dose seasonal 0 7 mL 10/24/2018, 10/13/2020, 10/27/2021, 11/17/2022   • Influenza, seasonal, injectable 10/01/2015   • Pneumococcal Conjugate 13-Valent 02/21/2018   • Pneumococcal Polysaccharide PPV23 01/18/2017   • Tdap 02/28/2019   • Zoster 08/30/2016      Health Maintenance:         Topic Date Due   • Hepatitis C Screening  05/02/2030 (Originally 1950)   • Breast Cancer Screening: Mammogram  12/27/2023   • Colorectal Cancer Screening  10/13/2025         Topic Date Due   • COVID-19 Vaccine (5 - Booster for Pfizer series) 11/26/2022      Medicare Screening Tests and Risk Assessments:         Health Risk Assessment:   Patient rates overall health as excellent  Patient feels that their physical health rating is much better  Patient is very satisfied with their life  Eyesight was rated as same  Hearing was rated as same  Patient feels that their emotional and mental health rating is same  Patients states they are never, rarely angry  Patient states they are never, rarely unusually tired/fatigued  Pain experienced in the last 7 days has been some  Patient's pain rating has been 2/10  Patient states that she has experienced no weight loss or gain in last 6 months  Depression Screening:   PHQ-2 Score: 0      Fall Risk Screening: In the past year, patient has experienced: no history of falling in past year      Urinary Incontinence Screening:   Patient has not leaked urine accidently in the last six months  Home Safety:  Patient does not have trouble with stairs inside or outside of their home  Patient has working smoke alarms and has working carbon monoxide detector  Home safety hazards include: none  Nutrition:   Current diet is Regular and Limited junk food  Medications:   Patient is currently taking over-the-counter supplements  OTC medications include: Calcium,b12, D,C, biotin,  Patient is able to manage medications  Activities of Daily Living (ADLs)/Instrumental Activities of Daily Living (IADLs):   Walk and transfer into and out of bed and chair?: Yes  Dress and groom yourself?: Yes    Bathe or shower yourself?: Yes    Feed yourself?  Yes  Do your laundry/housekeeping?: Yes  Manage your money, pay your bills and track your expenses?: Yes  Make your own meals?: Yes    Do your own shopping?: Yes    Previous Hospitalizations:   Any hospitalizations or ED visits within the last 12 months?: No      Advance Care Planning:   Living will: No    Durable POA for healthcare: Yes    Advanced directive: No    End of Life Decisions reviewed with patient: Yes    Provider agrees with end of life decisions: Yes      Cognitive Screening:   Provider or family/friend/caregiver concerned regarding cognition?: No    PREVENTIVE SCREENINGS      Cardiovascular Screening:    General: History Lipid Disorder and Screening Current      Diabetes Screening:     General: Screening Current      Colorectal Cancer Screening:     General: Screening Current      Breast Cancer Screening:     General: Screening Current      Cervical Cancer Screening:    General: Screening Current      Osteoporosis Screening:    General: Screening Current      Lung Cancer Screening:     General: Screening Not Indicated      Hepatitis C Screening:    General: Screening Current    Hep C Screening Accepted: No     Screening, Brief Intervention, and Referral to Treatment (SBIRT)    Screening  Typical number of drinks in a day: 0  Typical number of drinks in a week: 0  Interpretation: Low risk drinking behavior  AUDIT-C Screenin) How often did you have a drink containing alcohol in the past year? monthly or less  2) How many drinks did you have on a typical day when you were drinking in the past year? 1 to 2  3) How often did you have 6 or more drinks on one occasion in the past year? never    AUDIT-C Score: 1  Interpretation: Score 0-2 (female): Negative screen for alcohol misuse    Single Item Drug Screening:  How often have you used an illegal drug (including marijuana) or a prescription medication for non-medical reasons in the past year? never    Single Item Drug Screen Score: 0  Interpretation: Negative screen for possible drug use disorder    No results found       Physical Exam: "    /78   Pulse 72   Temp (!) 97 2 °F (36 2 °C) (Temporal)   Ht 5' 3\" (1 6 m)   Wt 72 3 kg (159 lb 6 4 oz)   LMP  (LMP Unknown)   SpO2 99%   BMI 28 24 kg/m²     Physical Exam  Vitals and nursing note reviewed  Constitutional:       General: She is not in acute distress  Appearance: Normal appearance  She is well-developed  HENT:      Head: Normocephalic and atraumatic  Right Ear: Tympanic membrane normal       Left Ear: Tympanic membrane normal       Mouth/Throat:      Mouth: Mucous membranes are moist    Eyes:      Conjunctiva/sclera: Conjunctivae normal       Pupils: Pupils are equal, round, and reactive to light  Neck:      Vascular: No carotid bruit  Cardiovascular:      Rate and Rhythm: Normal rate and regular rhythm  Heart sounds: No murmur heard  Pulmonary:      Effort: Pulmonary effort is normal  No respiratory distress  Breath sounds: Normal breath sounds  Abdominal:      General: Bowel sounds are normal       Palpations: Abdomen is soft  There is no mass  Tenderness: There is no abdominal tenderness  Musculoskeletal:         General: No swelling  Skin:     Capillary Refill: Capillary refill takes less than 2 seconds  Findings: No rash  Neurological:      Mental Status: She is alert and oriented to person, place, and time  Psychiatric:         Mood and Affect: Mood normal          Behavior: Behavior normal          Thought Content:  Thought content normal          Judgment: Judgment normal           Stacie Look, DO  "

## 2023-05-16 NOTE — PATIENT INSTRUCTIONS
Medicare Preventive Visit Patient Instructions  Thank you for completing your Welcome to Medicare Visit or Medicare Annual Wellness Visit today  Your next wellness visit will be due in one year (5/16/2024)  The screening/preventive services that you may require over the next 5-10 years are detailed below  Some tests may not apply to you based off risk factors and/or age  Screening tests ordered at today's visit but not completed yet may show as past due  Also, please note that scanned in results may not display below  Preventive Screenings:  Service Recommendations Previous Testing/Comments   Colorectal Cancer Screening  * Colonoscopy    * Fecal Occult Blood Test (FOBT)/Fecal Immunochemical Test (FIT)  * Fecal DNA/Cologuard Test  * Flexible Sigmoidoscopy Age: 39-70 years old   Colonoscopy: every 10 years (may be performed more frequently if at higher risk)  OR  FOBT/FIT: every 1 year  OR  Cologuard: every 3 years  OR  Sigmoidoscopy: every 5 years  Screening may be recommended earlier than age 39 if at higher risk for colorectal cancer  Also, an individualized decision between you and your healthcare provider will decide whether screening between the ages of 74-80 would be appropriate  Colonoscopy: 10/13/2015  FOBT/FIT: Not on file  Cologuard: Not on file  Sigmoidoscopy: Not on file    Screening Current     Breast Cancer Screening Age: 36 years old  Frequency: every 1-2 years  Not required if history of left and right mastectomy Mammogram: 12/27/2022    Screening Current   Cervical Cancer Screening Between the ages of 21-29, pap smear recommended once every 3 years  Between the ages of 33-67, can perform pap smear with HPV co-testing every 5 years     Recommendations may differ for women with a history of total hysterectomy, cervical cancer, or abnormal pap smears in past  Pap Smear: 10/05/2021    Screening Not Indicated   Hepatitis C Screening Once for adults born between 1945 and 1965  More frequently in patients at high risk for Hepatitis C Hep C Antibody: Not on file    Screening Current   Diabetes Screening 1-2 times per year if you're at risk for diabetes or have pre-diabetes Fasting glucose: 92 mg/dL (5/12/2023)  A1C: No results in last 5 years (No results in last 5 years)  Screening Current   Cholesterol Screening Once every 5 years if you don't have a lipid disorder  May order more often based on risk factors  Lipid panel: 05/12/2023    Screening Not Indicated  History Lipid Disorder     Other Preventive Screenings Covered by Medicare:  1  Abdominal Aortic Aneurysm (AAA) Screening: covered once if your at risk  You're considered to be at risk if you have a family history of AAA  2  Lung Cancer Screening: covers low dose CT scan once per year if you meet all of the following conditions: (1) Age 50-69; (2) No signs or symptoms of lung cancer; (3) Current smoker or have quit smoking within the last 15 years; (4) You have a tobacco smoking history of at least 20 pack years (packs per day multiplied by number of years you smoked); (5) You get a written order from a healthcare provider  3  Glaucoma Screening: covered annually if you're considered high risk: (1) You have diabetes OR (2) Family history of glaucoma OR (3)  aged 48 and older OR (3)  American aged 72 and older  3  Osteoporosis Screening: covered every 2 years if you meet one of the following conditions: (1) You're estrogen deficient and at risk for osteoporosis based off medical history and other findings; (2) Have a vertebral abnormality; (3) On glucocorticoid therapy for more than 3 months; (4) Have primary hyperparathyroidism; (5) On osteoporosis medications and need to assess response to drug therapy  · Last bone density test (DXA Scan): 10/30/2021   5  HIV Screening: covered annually if you're between the age of 15-65  Also covered annually if you are younger than 13 and older than 72 with risk factors for HIV infection  For pregnant patients, it is covered up to 3 times per pregnancy  Immunizations:  Immunization Recommendations   Influenza Vaccine Annual influenza vaccination during flu season is recommended for all persons aged >= 6 months who do not have contraindications   Pneumococcal Vaccine   * Pneumococcal conjugate vaccine = PCV13 (Prevnar 13), PCV15 (Vaxneuvance), PCV20 (Prevnar 20)  * Pneumococcal polysaccharide vaccine = PPSV23 (Pneumovax) Adults 25-60 years old: 1-3 doses may be recommended based on certain risk factors  Adults 72 years old: 1-2 doses may be recommended based off what pneumonia vaccine you previously received   Hepatitis B Vaccine 3 dose series if at intermediate or high risk (ex: diabetes, end stage renal disease, liver disease)   Tetanus (Td) Vaccine - COST NOT COVERED BY MEDICARE PART B Following completion of primary series, a booster dose should be given every 10 years to maintain immunity against tetanus  Td may also be given as tetanus wound prophylaxis  Tdap Vaccine - COST NOT COVERED BY MEDICARE PART B Recommended at least once for all adults  For pregnant patients, recommended with each pregnancy  Shingles Vaccine (Shingrix) - COST NOT COVERED BY MEDICARE PART B  2 shot series recommended in those aged 48 and above     Health Maintenance Due:      Topic Date Due   • Hepatitis C Screening  05/02/2030 (Originally 1950)   • Breast Cancer Screening: Mammogram  12/27/2023   • Colorectal Cancer Screening  10/13/2025     Immunizations Due:      Topic Date Due   • COVID-19 Vaccine (5 - Booster for Johnson Peter series) 11/26/2022     Advance Directives   What are advance directives? Advance directives are legal documents that state your wishes and plans for medical care  These plans are made ahead of time in case you lose your ability to make decisions for yourself  Advance directives can apply to any medical decision, such as the treatments you want, and if you want to donate organs     What are the types of advance directives? There are many types of advance directives, and each state has rules about how to use them  You may choose a combination of any of the following:  · Living will: This is a written record of the treatment you want  You can also choose which treatments you do not want, which to limit, and which to stop at a certain time  This includes surgery, medicine, IV fluid, and tube feedings  · Durable power of  for healthcare [facility]): This is a written record that states who you want to make healthcare choices for you when you are unable to make them for yourself  This person, called a proxy, is usually a family member or a friend  You may choose more than 1 proxy  · Do not resuscitate (DNR) order:  A DNR order is used in case your heart stops beating or you stop breathing  It is a request not to have certain forms of treatment, such as CPR  A DNR order may be included in other types of advance directives  · Medical directive: This covers the care that you want if you are in a coma, near death, or unable to make decisions for yourself  You can list the treatments you want for each condition  Treatment may include pain medicine, surgery, blood transfusions, dialysis, IV or tube feedings, and a ventilator (breathing machine)  · Values history: This document has questions about your views, beliefs, and how you feel and think about life  This information can help others choose the care that you would choose  Why are advance directives important? An advance directive helps you control your care  Although spoken wishes may be used, it is better to have your wishes written down  Spoken wishes can be misunderstood, or not followed  Treatments may be given even if you do not want them  An advance directive may make it easier for your family to make difficult choices about your care     Weight Management   Why it is important to manage your weight:  Being overweight increases your risk of health conditions such as heart disease, high blood pressure, type 2 diabetes, and certain types of cancer  It can also increase your risk for osteoarthritis, sleep apnea, and other respiratory problems  Aim for a slow, steady weight loss  Even a small amount of weight loss can lower your risk of health problems  How to lose weight safely:  A safe and healthy way to lose weight is to eat fewer calories and get regular exercise  You can lose up about 1 pound a week by decreasing the number of calories you eat by 500 calories each day  Healthy meal plan for weight management:  A healthy meal plan includes a variety of foods, contains fewer calories, and helps you stay healthy  A healthy meal plan includes the following:  · Eat whole-grain foods more often  A healthy meal plan should contain fiber  Fiber is the part of grains, fruits, and vegetables that is not broken down by your body  Whole-grain foods are healthy and provide extra fiber in your diet  Some examples of whole-grain foods are whole-wheat breads and pastas, oatmeal, brown rice, and bulgur  · Eat a variety of vegetables every day  Include dark, leafy greens such as spinach, kale, leena greens, and mustard greens  Eat yellow and orange vegetables such as carrots, sweet potatoes, and winter squash  · Eat a variety of fruits every day  Choose fresh or canned fruit (canned in its own juice or light syrup) instead of juice  Fruit juice has very little or no fiber  · Eat low-fat dairy foods  Drink fat-free (skim) milk or 1% milk  Eat fat-free yogurt and low-fat cottage cheese  Try low-fat cheeses such as mozzarella and other reduced-fat cheeses  · Choose meat and other protein foods that are low in fat  Choose beans or other legumes such as split peas or lentils  Choose fish, skinless poultry (chicken or turkey), or lean cuts of red meat (beef or pork)  Before you cook meat or poultry, cut off any visible fat  · Use less fat and oil  Try baking foods instead of frying them  Add less fat, such as margarine, sour cream, regular salad dressing and mayonnaise to foods  Eat fewer high-fat foods  Some examples of high-fat foods include french fries, doughnuts, ice cream, and cakes  · Eat fewer sweets  Limit foods and drinks that are high in sugar  This includes candy, cookies, regular soda, and sweetened drinks  Exercise:  Exercise at least 30 minutes per day on most days of the week  Some examples of exercise include walking, biking, dancing, and swimming  You can also fit in more physical activity by taking the stairs instead of the elevator or parking farther away from stores  Ask your healthcare provider about the best exercise plan for you  © Copyright [employer] 2018 Information is for End User's use only and may not be sold, redistributed or otherwise used for commercial purposes   All illustrations and images included in CareNotes® are the copyrighted property of A [employer] M [employer]  or [address] [place], [place] [address]pe

## 2023-08-22 DIAGNOSIS — J45.20 MILD INTERMITTENT ASTHMA, UNSPECIFIED WHETHER COMPLICATED: ICD-10-CM

## 2023-08-22 RX ORDER — LEVALBUTEROL TARTRATE 45 UG/1
1 AEROSOL, METERED ORAL EVERY 4 HOURS PRN
Qty: 15 G | Refills: 0 | Status: SHIPPED | OUTPATIENT
Start: 2023-08-22

## 2023-09-12 DIAGNOSIS — J30.89 ALLERGIC RHINITIS DUE TO OTHER ALLERGIC TRIGGER, UNSPECIFIED SEASONALITY: ICD-10-CM

## 2023-09-12 RX ORDER — FLUTICASONE PROPIONATE 50 MCG
2 SPRAY, SUSPENSION (ML) NASAL AS NEEDED
Qty: 16 G | Refills: 0 | Status: SHIPPED | OUTPATIENT
Start: 2023-09-12

## 2023-10-30 ENCOUNTER — HOSPITAL ENCOUNTER (OUTPATIENT)
Dept: BONE DENSITY | Facility: MEDICAL CENTER | Age: 73
Discharge: HOME | End: 2023-10-30
Payer: MEDICARE

## 2023-10-30 DIAGNOSIS — Z13.820 ENCOUNTER FOR OSTEOPOROSIS SCREENING IN ASYMPTOMATIC POSTMENOPAUSAL PATIENT: ICD-10-CM

## 2023-10-30 DIAGNOSIS — Z78.0 ENCOUNTER FOR OSTEOPOROSIS SCREENING IN ASYMPTOMATIC POSTMENOPAUSAL PATIENT: ICD-10-CM

## 2023-10-30 PROCEDURE — 77080 DXA BONE DENSITY AXIAL: CPT

## 2023-11-08 ENCOUNTER — OFFICE VISIT (OUTPATIENT)
Dept: FAMILY MEDICINE CLINIC | Facility: CLINIC | Age: 73
End: 2023-11-08
Payer: MEDICARE

## 2023-11-08 VITALS
WEIGHT: 155.6 LBS | TEMPERATURE: 97.4 F | DIASTOLIC BLOOD PRESSURE: 80 MMHG | OXYGEN SATURATION: 99 % | RESPIRATION RATE: 16 BRPM | BODY MASS INDEX: 27.57 KG/M2 | HEIGHT: 63 IN | SYSTOLIC BLOOD PRESSURE: 142 MMHG | HEART RATE: 75 BPM

## 2023-11-08 DIAGNOSIS — Z12.31 ENCOUNTER FOR SCREENING MAMMOGRAM FOR BREAST CANCER: ICD-10-CM

## 2023-11-08 DIAGNOSIS — Z23 ENCOUNTER FOR IMMUNIZATION: ICD-10-CM

## 2023-11-08 DIAGNOSIS — E78.2 MIXED HYPERLIPIDEMIA: Primary | ICD-10-CM

## 2023-11-08 PROCEDURE — G0008 ADMIN INFLUENZA VIRUS VAC: HCPCS

## 2023-11-08 PROCEDURE — 99213 OFFICE O/P EST LOW 20 MIN: CPT | Performed by: FAMILY MEDICINE

## 2023-11-08 PROCEDURE — 90662 IIV NO PRSV INCREASED AG IM: CPT

## 2023-11-08 NOTE — PROGRESS NOTES
Name: Yvette Oro      :       MRN: [id number]  Encounter Provider: Warner Engle DO  Encounter Date: 2023   Encounter department: [facility] PRIMARY CARE    Assessment & Plan     1. Mixed hyperlipidemia  Comments:  Remains off meds, dietary and fitness only. 2. Encounter for screening mammogram for breast cancer  -     Mammo screening bilateral w 3d & cad; Future; Expected date: 2023    3. Encounter for immunization  -     influenza vaccine, high-dose, PF 0.7 mL (FLUZONE HIGH-DOSE)           Subjective      Chief Complaint   Patient presents with    Follow-up     6 month follow up. Pt had Covid vaccine 3 weeks ago and will bring in documentation. Pt would like flu vaccine today. Been using IBU regularly  68-year-old female here for 6-month checkup. Flu shot given today. Will be going on a cruise to the [facility]  Review of Systems   Musculoskeletal:  Positive for neck stiffness. All other systems reviewed and are negative.       Current Outpatient Medications on File Prior to Visit   Medication Sig    Ascorbic Acid (vitamin C) 1000 MG tablet Take 1,000 mg by mouth daily      bimatoprost (LUMIGAN) 0.01 % ophthalmic drops Administer 1 drop to both eyes daily at bedtime    BIOTIN PO Take by mouth    calcium carbonate 1250 MG capsule Take 1,250 mg by mouth daily    Cholecalciferol (VITAMIN D3) 5000 units TABS Take by mouth      cyanocobalamin (VITAMIN B-12) 1,000 mcg tablet Take by mouth daily      fluticasone (FLONASE) 50 mcg/act nasal spray 2 sprays into each nostril as needed for rhinitis or allergies    levalbuterol (Xopenex HFA) 45 mcg/act inhaler Inhale 1 puff every 4 (four) hours as needed for wheezing       Objective     /80   Pulse 75   Temp (!) 97.4 °F (36.3 °C) (Temporal)   Resp 16   Ht 5' 2.5" (1.588 m)   Wt 70.6 kg (155 lb 9.6 oz)   LMP  (LMP Unknown)   SpO2 99%   BMI 28.01 kg/m²     Physical Exam  Constitutional:       General: She is not in acute distress. Appearance: Normal appearance. She is well-developed. HENT:      Right Ear: Tympanic membrane normal.      Left Ear: Tympanic membrane normal.      Nose: Nose normal.      Mouth/Throat:      Mouth: Mucous membranes are moist.   Eyes:      Pupils: Pupils are equal, round, and reactive to light. Neck:      Vascular: No carotid bruit. Cardiovascular:      Rate and Rhythm: Normal rate and regular rhythm. Pulses: Normal pulses. Heart sounds: No murmur heard. Pulmonary:      Effort: Pulmonary effort is normal.      Breath sounds: Normal breath sounds. Skin:     Findings: Rash (circular lesion c/w tinea-and right lower leg lateral just above malleolus) present. Neurological:      Mental Status: She is alert and oriented to person, place, and time. Deep Tendon Reflexes: Reflexes are normal and symmetric. Psychiatric:         Mood and Affect: Mood normal.         Behavior: Behavior normal.         Thought Content:  Thought content normal.         Judgment: Judgment normal.       Ples Cerise, DO

## 2023-12-27 DIAGNOSIS — J30.89 ALLERGIC RHINITIS DUE TO OTHER ALLERGIC TRIGGER, UNSPECIFIED SEASONALITY: ICD-10-CM

## 2023-12-27 RX ORDER — FLUTICASONE PROPIONATE 50 MCG
2 SPRAY, SUSPENSION (ML) NASAL AS NEEDED
Qty: 16 G | Refills: 0 | Status: SHIPPED | OUTPATIENT
Start: 2023-12-27

## 2023-12-29 ENCOUNTER — HOSPITAL ENCOUNTER (OUTPATIENT)
Dept: MAMMOGRAPHY | Facility: MEDICAL CENTER | Age: 73
Discharge: HOME | End: 2023-12-29
Payer: MEDICARE

## 2023-12-29 VITALS — HEIGHT: 63 IN | BODY MASS INDEX: 27.46 KG/M2 | WEIGHT: 155 LBS

## 2023-12-29 DIAGNOSIS — Z12.31 ENCOUNTER FOR SCREENING MAMMOGRAM FOR BREAST CANCER: ICD-10-CM

## 2023-12-29 PROCEDURE — 77067 SCR MAMMO BI INCL CAD: CPT

## 2023-12-29 PROCEDURE — 77063 BREAST TOMOSYNTHESIS BI: CPT

## 2024-01-01 DIAGNOSIS — R92.8 ABNORMAL MAMMOGRAM OF RIGHT BREAST: Primary | ICD-10-CM

## 2024-01-19 DIAGNOSIS — J30.89 ALLERGIC RHINITIS DUE TO OTHER ALLERGIC TRIGGER, UNSPECIFIED SEASONALITY: ICD-10-CM

## 2024-01-19 RX ORDER — FLUTICASONE PROPIONATE 50 MCG
2 SPRAY, SUSPENSION (ML) NASAL AS NEEDED
Qty: 48 ML | Refills: 1 | Status: SHIPPED | OUTPATIENT
Start: 2024-01-19

## 2024-01-22 ENCOUNTER — OFFICE VISIT (OUTPATIENT)
Dept: FAMILY MEDICINE CLINIC | Facility: CLINIC | Age: 74
End: 2024-01-22
Payer: MEDICARE

## 2024-01-22 VITALS
HEIGHT: 63 IN | BODY MASS INDEX: 28.21 KG/M2 | HEART RATE: 79 BPM | SYSTOLIC BLOOD PRESSURE: 130 MMHG | TEMPERATURE: 97.6 F | OXYGEN SATURATION: 99 % | DIASTOLIC BLOOD PRESSURE: 68 MMHG | WEIGHT: 159.2 LBS

## 2024-01-22 DIAGNOSIS — G89.29 CHRONIC RIGHT-SIDED LOW BACK PAIN WITHOUT SCIATICA: Primary | ICD-10-CM

## 2024-01-22 DIAGNOSIS — E55.9 VITAMIN D DEFICIENCY: ICD-10-CM

## 2024-01-22 DIAGNOSIS — M54.50 CHRONIC RIGHT-SIDED LOW BACK PAIN WITHOUT SCIATICA: Primary | ICD-10-CM

## 2024-01-22 DIAGNOSIS — M85.80 OSTEOPENIA, UNSPECIFIED LOCATION: ICD-10-CM

## 2024-01-22 PROCEDURE — 99214 OFFICE O/P EST MOD 30 MIN: CPT | Performed by: FAMILY MEDICINE

## 2024-01-22 RX ORDER — PREDNISONE 10 MG/1
TABLET ORAL
Qty: 18 TABLET | Refills: 0 | Status: SHIPPED | OUTPATIENT
Start: 2024-01-22

## 2024-01-22 RX ORDER — PREDNISONE 10 MG/1
TABLET ORAL
Qty: 18 TABLET | Refills: 0 | Status: SHIPPED | OUTPATIENT
Start: 2024-01-22 | End: 2024-01-22 | Stop reason: SDUPTHER

## 2024-01-22 NOTE — PATIENT INSTRUCTIONS
Chronic Back Pain   AMBULATORY CARE:   Chronic back pain  is back pain that lasts 3 months or longer. This may include pain that has not been controlled or does not improve with treatment. Your back pain may cause weakness or pain that spreads to your arms or legs.  Call your doctor if:   You have severe pain.    You have new numbness, tingling, or weakness, especially in your lower back, legs, arms, or genital area.    You lose control of your bladder or bowel movements.     You have a fever or sudden weight loss.    You have new or worse pain.    You have questions or concerns about your condition or care.    Treatment for chronic back pain  may include any of the following:  Acetaminophen  decreases pain and fever. It is available without a doctor's order. Ask how much to take and how often to take it. Follow directions. Read the labels of all other medicines you are using to see if they also contain acetaminophen, or ask your doctor or pharmacist. Acetaminophen can cause liver damage if not taken correctly.    NSAIDs , such as ibuprofen, help decrease swelling, pain, and fever. This medicine is available with or without a doctor's order. NSAIDs can cause stomach bleeding or kidney problems in certain people. If you take blood thinner medicine, always ask your healthcare provider if NSAIDs are safe for you. Always read the medicine label and follow directions.    Prescription pain medicine  called narcotics or opioids may be given for certain types of chronic pain. Ask your healthcare provider how to take this medicine safely.    Anesthetics  can be rubbed on your skin or injected into a nerve or muscle to numb an area.    Other medicines  may reduce pain, anxiety, muscle tension, or swelling.    Take your medicine as directed.  Contact your healthcare provider if you think your medicine is not helping or if you have side effects. Tell your provider if you are allergic to any medicine. Keep a list of the  medicines, vitamins, and herbs you take. Include the amounts, and when and why you take them. Bring the list or the pill bottles to follow-up visits. Carry your medicine list with you in case of an emergency.    Manage your symptoms:   Apply ice for 15 to 20 minutes every hour, or as directed.  Use an ice pack, or put crushed ice in a plastic bag. Cover it with a towel before you apply it to your skin. Ice decreases pain and helps prevent tissue damage.    Apply heat for 20 to 30 minutes every 2 hours, or as directed.  Heat helps decrease pain and muscle spasms.    Use massage to loosen tense muscles.  Massage may relieve back pain caused by tight muscles. Regular massages may help prevent this kind of back pain.    Ask about acupuncture for pain relief.  Back pain is sometimes relieved with acupuncture. Talk to your healthcare provider before you get this treatment to make sure it is safe for you.    Other ways to relieve or prevent back pain:   Manage stress.  Stress can cause back pain or make it worse. Some ways to reduce stress are listening to music, meditating, or using aromatherapy. It may help to talk with a therapist about anything that is causing you stress. Your healthcare provider can give you more information.    Stay active as much as you can without causing more pain.  Ask your healthcare provider which exercises are right for you. Do not sit or lie down for long periods. This could make your back pain worse. Yoga or similar gentle movements may help relieve pain and tension in your back. Go slowly and do not strain your back as you do any movement.    Be careful when you lift heavy objects.  Do not lift anything heavy until your pain is gone. Never strain your back when you lift a heavy item. If possible, ask someone to help you.    Go to physical therapy as directed.  A physical therapist can teach you exercises to help improve movement and strength, and to decrease pain.    Follow up with your  doctor as directed:  You may be referred to a sports medicine or spine specialist. Write down your questions so you remember to ask them during your visits.  © Copyright [employer] 2023 Information is for End User's use only and may not be sold, redistributed or otherwise used for commercial purposes.  The above information is an  only. It is not intended as medical advice for individual conditions or treatments. Talk to your doctor, nurse or pharmacist before following any medical regimen to see if it is safe and effective for you.

## 2024-01-22 NOTE — ASSESSMENT & PLAN NOTE
As this is over a year and is getting worse and no xray since 2016 will get xray I will also refer to the PT departmtent She will take the prednisone as directed

## 2024-01-22 NOTE — ASSESSMENT & PLAN NOTE
Due to this hisotry and the chronic pain I want to check dexa Discussed need for continued calcium and vitamin D also
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
## 2024-01-23 ENCOUNTER — APPOINTMENT (OUTPATIENT)
Dept: RADIOLOGY | Facility: CLINIC | Age: 74
End: 2024-01-23
Payer: MEDICARE

## 2024-01-23 DIAGNOSIS — M54.50 CHRONIC RIGHT-SIDED LOW BACK PAIN WITHOUT SCIATICA: ICD-10-CM

## 2024-01-23 DIAGNOSIS — G89.29 CHRONIC RIGHT-SIDED LOW BACK PAIN WITHOUT SCIATICA: ICD-10-CM

## 2024-01-23 DIAGNOSIS — M85.80 OSTEOPENIA, UNSPECIFIED LOCATION: ICD-10-CM

## 2024-01-23 PROCEDURE — 72110 X-RAY EXAM L-2 SPINE 4/>VWS: CPT

## 2024-01-26 ENCOUNTER — EVALUATION (OUTPATIENT)
Dept: PHYSICAL THERAPY | Facility: CLINIC | Age: 74
End: 2024-01-26
Payer: MEDICARE

## 2024-01-26 DIAGNOSIS — G89.29 CHRONIC RIGHT-SIDED LOW BACK PAIN WITHOUT SCIATICA: Primary | ICD-10-CM

## 2024-01-26 DIAGNOSIS — M54.50 CHRONIC RIGHT-SIDED LOW BACK PAIN WITHOUT SCIATICA: Primary | ICD-10-CM

## 2024-01-26 PROCEDURE — 97161 PT EVAL LOW COMPLEX 20 MIN: CPT

## 2024-01-26 PROCEDURE — 97112 NEUROMUSCULAR REEDUCATION: CPT

## 2024-01-26 NOTE — PROGRESS NOTES
PT Evaluation     Today's date: 2024  Patient name: Jessica Ricci  : 1950  MRN: [id number]  Referring provider: Alisa Florence DO  Dx:   Encounter Diagnosis     ICD-10-CM    1. Chronic right-sided low back pain without sciatica  M54.50 Ambulatory Referral to Physical Therapy    G89.29                    Assessment  Assessment details: Patient is a 73 year old female that presents to outpatient physical therapy with chronic low back pain without sciatica. Pt demonstrates signs and symptoms indicate decreased movement coordination. Pt exhibits limited lumbopelvic mobility and decreased functional core strength. Pt also exhibited increased lumbar lordosis with sitting and standing posture, (+) Wili test bilaterally, (+) TA activation bilaterally. Pt demonstrates increased pain, decreased ROM, decreased strength, decreased activity tolerance, and decreased functional activity due to pain. Pt appears motivated; HEP was reviewed and given to patient. Pt would benefit from skilled physical therapy to address noted impairments, meet patient's goals, and to return to [facility].   Thank you for this referral.    Impairments: abnormal gait, abnormal muscle firing, abnormal muscle tone, abnormal or restricted ROM, activity intolerance, impaired balance, impaired physical strength, lacks appropriate home exercise program, pain with function, poor posture  and poor body mechanics    Symptom irritability: lowUnderstanding of Dx/Px/POC: good   Prognosis: good    Goals  STGs:   1. Pt will be able to demonstrate an increase of strength by at least 1/2 grade within 4 weeks   2. Pt will be able to achieve increased ROM by at least 25% within 4 weeks.   3. Pt will be able to report pain less than 4/10 at worse within 4 weeks.   4. Pt will be able to demonstrate improved hip abductor strength to at least 4+/5 MMT within 4 weeks   5. Pt will be able to self correct lumbar posture without cueing with ambulation within 2  weeks  6. Pt will be able to performed bed mobility without increase of symptoms within 4 weeks.     LTGs:   1. Pt will be independent with all IADLs without pain or discomfort upon discharge.   2. Pt will be independent with HEP upon discharge   3. Pt will be able to report no pain or discomfort with all work duties and recreational activities for a full day upon discharge.  4. Pt will be able to report 'no difficulty' with heavy household activities such as cleaning or lifting at least 10 lbs to waist upon discharge         Plan  Plan details: Visits: 2-3x a week   Duration: 8 weeks   Patient would benefit from: PT eval and skilled physical therapy  Planned modality interventions: cryotherapy, electrical stimulation/Russian stimulation, iontophoresis, low level laser therapy, TENS, thermotherapy: hydrocollator packs, ultrasound, traction and unattended electrical stimulation  Planned therapy interventions: abdominal trunk stabilization, IADL retraining, joint mobilization, manual therapy, massage, muscle pump exercises, neuromuscular re-education, patient education, postural training, strengthening, stretching, therapeutic activities, therapeutic training, therapeutic exercise, home exercise program, functional ROM exercises, gait training, balance/weight bearing training, balance, ADL training, activity modification, flexibility, Enriquez taping and breathing training  Treatment plan discussed with: patient      Subjective Evaluation    History of Present Illness  Mechanism of injury: Pt states that she has been having low back pain/discomfort for the past year. Denies ILEANA. She would previously get a pain in the right low back with certain movements, but now, since last last year, her pain occurs almost every day. Pain is sudden and is cramping like a muscle is being pulled. Her pain is worse during the day with reports of stiffness in the morning and at night. Has been busy with her new dog over the last few  month, but also indicates that she has been more sedentary since retiring a few years ago.    Denies changes in bowel/bladder. Denies saddle anesthesia. Denies numbness/tingling    Risk factors include history of osteoporosis, menopause, obesity and poor posture.    AGGS: bending, position, prolonged standing and twisting  EASES: sitting, rest, medication   Goals: decrease pain, be able to standing longer without pain       Imaging findings:     Lumbar Xray on 2024:      * Mild scoliotic deformity is noted. Grade 1 anterolisthesis L5 on S1 with stepwise retrolisthesis L1-2, L2-3 and L3-4.       * Multilevel disc space narrowing most advanced L4-5 and L2-3 with multilevel advanced facet arthropathy.      DEXA Scan 10/2023:     LUMBAR SPINE:  L1-L4:    BMD 1.214 gm/cm2    T-score 1.5 and 3% less than 202. Statistically significant change.    Z-score 3.9    LEFT TOTAL HIP:    BMD 0.954 gm/cm2    T-score 0.1    Z-score 1.9     LEFT FEMORAL NECK:    BMD 0.747 gm/cm2    T-score -0.9 and 3% higher than 2021.    Z-score 1.2    DEXA SCAN IMPRESSION:  Based on the WHO classification, this study is normal and the patient is considered at low risk for fracture  Patient Goals  Patient goals for therapy: decreased pain, return to sport/leisure activities and increased motion    Pain  Current pain rating: 3  At best pain ratin  At worst pain ratin  Quality: dull ache, sharp, cramping, tight and pulling        Objective     Static Posture     Lumbar Spine   Increased lordosis.     Pelvis   Anterior pelvic tilt  Pelvis (Left): Elevated.     Hip   Hip (Left): Externally rotated.   Hip (Right): Externally rotated.     Palpation   Left   Hypertonic in the lumbar paraspinals.     Right   Hypertonic in the lumbar paraspinals.     Tenderness     Right Hip   Tenderness in the PSIS.     Neurological Testing     Sensation     Lumbar   Left   Intact: light touch    Right   Intact: light touch    Reflexes   Left   Clonus sign:  negative    Right   Clonus sign: negative    Active Range of Motion     Lumbar   Flexion:  WFL and with pain  Extension:  with pain Restriction level: moderate  Left lateral flexion:  with pain Restriction level: maximal  Right lateral flexion:  with pain Restriction level: moderate  Left rotation:  Restriction level: moderate  Right rotation:  Restriction level: moderate    Joint Play     Hypomobile: L1, L2, L3, L4, L5 and S1     Strength/Myotome Testing     Lumbar   Left   Heel walk: normal  Toe walk: normal    Right   Heel walk: normal  Toe walk: normal    Left Hip   Planes of Motion   Flexion: 4  Extension: 4-  Abduction: 4    Right Hip   Planes of Motion   Flexion: 4  Extension: 3+  Abduction: 4-    Left Knee   Flexion: 4+  Extension: 4+    Right Knee   Flexion: 4+  Extension: 4+    Left Ankle/Foot   Dorsiflexion: 4+  Plantar flexion: 4+    Right Ankle/Foot   Dorsiflexion: 4+  Plantar flexion: 4+    Muscle Activation   Patient able to activate left transverse abdominals, left external obliques, left internal obliques, left multifidus, right transverse abdominals, right external obliques, right internal obliques and right multifidus.     Tests   Pain with max closing      Lumbar   Positive prone instability .     Left   Negative crossed SLR, passive SLR, quadrant and slump test.     Right   Positive quadrant.   Negative crossed SLR, passive SLR and slump test.     Left Hip   Negative SEAMUS and FADIR.     Right Hip   Negative SEAMUS and FADIR.     Ambulation     Quality of Movement During Gait   Trunk    Trunk (Left): Positive left lateral lean over stance limb.   Trunk (Right): Positive lateral lean over stance limb.     Pelvis  Anterior pelvic tilt.     Functional Assessment      Squat    within functional limits           Precautions: GERD, Asthma    HEP: [id number]     Daily Treatment Diary    Date 1/26            FOTO IE -             Re-Eval IE               Manuals    Lumbar mobs             Paraspinal TPR     "                                   Neuro Re-Ed     PPT 2x10            PPT with SLR             PPT with bridge 2x10            Supine core ISO with TBall                                                    Ther Ex    Bike              Hamstring stretch             Piriformis stretch              LTRs             Standing lumbar shifts at wall (L at wall)             Seated Tball roll outs             Clamshell             Standing hip abd/ext             Standing hip flexor stretch  15\" hold 2x (B)                                      Standing Pallof Press                                                    Ther Activity                              Gait Training                              Modalities    Heat vs Ice PRN                                  "

## 2024-01-29 ENCOUNTER — OFFICE VISIT (OUTPATIENT)
Dept: PHYSICAL THERAPY | Facility: CLINIC | Age: 74
End: 2024-01-29
Payer: MEDICARE

## 2024-01-29 DIAGNOSIS — M54.50 CHRONIC RIGHT-SIDED LOW BACK PAIN WITHOUT SCIATICA: Primary | ICD-10-CM

## 2024-01-29 DIAGNOSIS — G89.29 CHRONIC RIGHT-SIDED LOW BACK PAIN WITHOUT SCIATICA: Primary | ICD-10-CM

## 2024-01-29 PROCEDURE — 97112 NEUROMUSCULAR REEDUCATION: CPT

## 2024-01-29 PROCEDURE — 97110 THERAPEUTIC EXERCISES: CPT

## 2024-01-29 PROCEDURE — 97140 MANUAL THERAPY 1/> REGIONS: CPT

## 2024-01-29 NOTE — PROGRESS NOTES
"Daily Note     Today's date: 2024  Patient name: Jessica Ricci  : 1950  MRN: [id number]  Referring provider: Alisa Florence DO  Dx:   Encounter Diagnosis     ICD-10-CM    1. Chronic right-sided low back pain without sciatica  M54.50     G89.29                      Subjective: Pt states that she is doing well. Reports that she has been able to complete the HEP regularly, even with day to day activities. Feels that they are helpful.       Objective: See treatment diary below      Assessment: Tolerated treatment well. HEP was reviewed with patient. Activities were performed to increase core activation and strength and to limit increase lumbar lordosis with activities. Cueing given throughout session to limit compensations. Pt reported increase fatigue to proximal LE (R>L) with standing activities. Patient demonstrated fatigue post treatment and would benefit from continued PT      Plan: Continue per plan of care.      Precautions: GERD, Asthma    HEP: [id number]     Daily Treatment Diary    Date            FOTO IE -             Re-Eval IE               Manuals    Lumbar mobs             Hip flexor stretch   JM (B)                                     Neuro Re-Ed     PPT 2x10 10x  HEP          PPT with SLR  2x10 (B)           PPT with bridge 2x10 10x           Supine core ISO with TBall  2x10 Green TBall                                                  Ther Ex    Bike   NV           Hamstring stretch  30\"x2 (B)           Piriformis stretch   30\"x2 (B)           LTRs  2 mins            Standing lumbar shifts at wall (L at wall)             Seated Tball roll outs  10x fwd 10x L           Standing hip abduction with TB  OTB 2x8           Standing hip ext with TB  OTB 10x           Standing hip flexor stretch  15\" hold 2x (B)            Leg press  55# 2x15                        Standing Pallof Press  Double green 2x10 (B)                                                  Ther Activity               "                Gait Training                              Modalities    Heat vs Ice PRN

## 2024-01-31 ENCOUNTER — OFFICE VISIT (OUTPATIENT)
Dept: PHYSICAL THERAPY | Facility: CLINIC | Age: 74
End: 2024-01-31
Payer: MEDICARE

## 2024-01-31 DIAGNOSIS — G89.29 CHRONIC RIGHT-SIDED LOW BACK PAIN WITHOUT SCIATICA: Primary | ICD-10-CM

## 2024-01-31 DIAGNOSIS — M54.50 CHRONIC RIGHT-SIDED LOW BACK PAIN WITHOUT SCIATICA: Primary | ICD-10-CM

## 2024-01-31 PROCEDURE — 97110 THERAPEUTIC EXERCISES: CPT

## 2024-01-31 PROCEDURE — 97112 NEUROMUSCULAR REEDUCATION: CPT
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
## 2024-02-05 ENCOUNTER — OFFICE VISIT (OUTPATIENT)
Dept: PHYSICAL THERAPY | Facility: CLINIC | Age: 74
End: 2024-02-05
Payer: MEDICARE

## 2024-02-05 DIAGNOSIS — M54.50 CHRONIC RIGHT-SIDED LOW BACK PAIN WITHOUT SCIATICA: Primary | ICD-10-CM

## 2024-02-05 DIAGNOSIS — G89.29 CHRONIC RIGHT-SIDED LOW BACK PAIN WITHOUT SCIATICA: Primary | ICD-10-CM

## 2024-02-05 PROCEDURE — 97140 MANUAL THERAPY 1/> REGIONS: CPT

## 2024-02-05 PROCEDURE — 97110 THERAPEUTIC EXERCISES: CPT

## 2024-02-05 PROCEDURE — 97112 NEUROMUSCULAR REEDUCATION: CPT

## 2024-02-05 NOTE — PROGRESS NOTES
"Daily Note     Today's date: 2024  Patient name: Jessica Ricci  : 1950  MRN: [id number]  Referring provider: Alisa Florence DO  Dx:   Encounter Diagnosis     ICD-10-CM    1. Chronic right-sided low back pain without sciatica  M54.50     G89.29                      Subjective: Patient reported LB pain improved compared to last session. Felt good waking up this morning but then when she went to get up from sitting she felt some increased stiffness in R side of LB. Was able to stand and make cookies and clean dog crate that required some bending and did have some increase in symptoms following. Pain is and still focused to R side.      Objective: See treatment diary below.      Assessment: Able to resume program that was modified last visit secondary to time constraints. Muscle fatigue as expected given deep core and proximal hip weakness. Improving lumbopelvic mobility. Patient would benefit from continuation of skilled PT as they have not yet returned to activities and daily functions without pain and limitations.      Plan: Continue per plan of care.          Precautions: GERD, Asthma    HEP: [id number]     Daily Treatment Diary    Date          FOTO IE -             Re-Eval IE               Manuals    Lumbar mobs             Hip flexor stretch   JM (B) Bilat - EH Bilat - EH                                   Neuro Re-Ed     PPT 2x10 10x  HEP          PPT with SLR  2x10 (B)  2x10  bilat         PPT with bridge 2x10 10x  x10         Supine core ISO with TBall  2x10 Green TBall Seated  Pball  2x10 Supine  2x10                                                Ther Ex    Bike   NV  5 min  L1         Hamstring stretch  30\"x2 (B)           Piriformis stretch   30\"x2 (B)           LTRs  2 mins   2 min         Standing lumbar shifts at wall   (L at wall)             Seated Tball roll outs  10x fwd 10x L Fwd  x10    To L  x10          Standing hip abduction with TB  OTB 2x8  OTB  2x8  bilat     " "    Standing hip ext with TB  OTB 10x  OTB  x10  bilat         Standing hip flexor stretch  15\" hold 2x (B)            Leg press -  seat 5  55# 2x15 55#  2x15 65#  2x15                      Standing Pallof Press  Double green 2x10 (B) Double GTB    2x10   bilat Double  GTB    2x10  bilat                                                Ther Activity                              Gait Training                              Modalities    Heat vs Ice PRN                               "

## 2024-02-06 ENCOUNTER — HOSPITAL ENCOUNTER (OUTPATIENT)
Dept: ULTRASOUND IMAGING | Facility: CLINIC | Age: 74
Discharge: HOME | End: 2024-02-06
Payer: MEDICARE

## 2024-02-06 ENCOUNTER — HOSPITAL ENCOUNTER (OUTPATIENT)
Dept: MAMMOGRAPHY | Facility: CLINIC | Age: 74
Discharge: HOME | End: 2024-02-06
Payer: MEDICARE

## 2024-02-06 VITALS — WEIGHT: 159 LBS | BODY MASS INDEX: 28.17 KG/M2 | HEIGHT: 63 IN

## 2024-02-06 DIAGNOSIS — R92.8 ABNORMAL MAMMOGRAM OF RIGHT BREAST: ICD-10-CM

## 2024-02-06 PROCEDURE — 77065 DX MAMMO INCL CAD UNI: CPT

## 2024-02-06 PROCEDURE — G0279 TOMOSYNTHESIS, MAMMO: HCPCS

## 2024-02-07 ENCOUNTER — APPOINTMENT (OUTPATIENT)
Dept: PHYSICAL THERAPY | Facility: CLINIC | Age: 74
End: 2024-02-07
Payer: MEDICARE

## 2024-02-09 ENCOUNTER — OFFICE VISIT (OUTPATIENT)
Dept: PHYSICAL THERAPY | Facility: CLINIC | Age: 74
End: 2024-02-09
Payer: MEDICARE

## 2024-02-09 DIAGNOSIS — G89.29 CHRONIC RIGHT-SIDED LOW BACK PAIN WITHOUT SCIATICA: Primary | ICD-10-CM

## 2024-02-09 DIAGNOSIS — M54.50 CHRONIC RIGHT-SIDED LOW BACK PAIN WITHOUT SCIATICA: Primary | ICD-10-CM

## 2024-02-09 PROCEDURE — 97140 MANUAL THERAPY 1/> REGIONS: CPT

## 2024-02-09 PROCEDURE — 97112 NEUROMUSCULAR REEDUCATION: CPT

## 2024-02-09 PROCEDURE — 97110 THERAPEUTIC EXERCISES: CPT

## 2024-02-09 NOTE — PROGRESS NOTES
"Daily Note     Today's date: 2024  Patient name: Jessica Ricci  : 1950  MRN: [id number]  Referring provider: Alisa Florence DO  Dx:   Encounter Diagnosis     ICD-10-CM    1. Chronic right-sided low back pain without sciatica  M54.50     G89.29                      Subjective: Pt states that she is doing well. Indicates that she continues to notice some improvements with the level of symptoms and the duration of her low back pain. Feel that if she did not do anything, she would not have any pain.      Objective: See treatment diary below      Assessment: Tolerated treatment well. Progressed core strengthening and and activation today with increased dynamic and static activities. Pt reported mild soreness and fatigue to her lower back following today's session. Updated HEP as noted below. Patient demonstrated fatigue post treatment and would benefit from continued PT      Plan: Continue per plan of care.      Precautions: GERD, Asthma    HEP: [id number]     Daily Treatment Diary    Date         FOTO IE -     JM *        Re-Eval IE               Manuals    Lumbar mobs             Hip flexor stretch   JM (B) Bilat - EH Bilat - EH JM (B)                                  Neuro Re-Ed     PPT 2x10 10x  HEP          PPT with SLR  2x10 (B)  2x10  bilat 10x (B) legs extension HEP       PPT with bridge 2x10 10x  x10         Supine core ISO with TBall  2x10 Green TBall Seated  Pball  2x10 Supine  2x10         Seated modified dead bug (with SL)     10x ea (march, extended, march) HEP                                 Ther Ex    Bike   NV  5 min  L1 5 min  L1        Hamstring stretch  30\"x2 (B)   30\"x2 (B)        Piriformis stretch   30\"x2 (B)           LTRs  2 mins   2 min 2 min        Standing lumbar shifts at wall   (L at wall)             Seated Tball roll outs  10x fwd 10x L Fwd  x10    To L  x10          Standing hip abduction with TB  OTB 2x8  OTB  2x8  bilat 25# 2x10 (B)        Standing " "hip ext with TB  OTB 10x  OTB  x10  bilat 25# 15x (B)        Standing hip flexor stretch  15\" hold 2x (B)            Leg press -  seat 5  55# 2x15 55#  2x15 65#  2x15 65#  2x15        Side lying hip abd     2x10 (B) 1# HEP       Standing Pallof Press  Double green 2x10 (B) Double GTB    2x10   bilat Double  GTB    2x10  bilat Double  GTB  1x10  bilat HEP                                              Ther Activity                              Gait Training                              Modalities    Heat vs Ice PRN                                 "

## 2024-02-13 ENCOUNTER — APPOINTMENT (OUTPATIENT)
Dept: PHYSICAL THERAPY | Facility: CLINIC | Age: 74
End: 2024-02-13
Payer: MEDICARE

## 2024-02-14 ENCOUNTER — OFFICE VISIT (OUTPATIENT)
Dept: PHYSICAL THERAPY | Facility: CLINIC | Age: 74
End: 2024-02-14
Payer: MEDICARE

## 2024-02-14 DIAGNOSIS — M54.50 CHRONIC RIGHT-SIDED LOW BACK PAIN WITHOUT SCIATICA: Primary | ICD-10-CM

## 2024-02-14 DIAGNOSIS — G89.29 CHRONIC RIGHT-SIDED LOW BACK PAIN WITHOUT SCIATICA: Primary | ICD-10-CM

## 2024-02-14 PROCEDURE — 97112 NEUROMUSCULAR REEDUCATION: CPT

## 2024-02-14 PROCEDURE — 97140 MANUAL THERAPY 1/> REGIONS: CPT

## 2024-02-14 PROCEDURE — 97110 THERAPEUTIC EXERCISES: CPT

## 2024-02-14 NOTE — PROGRESS NOTES
Daily Note     Today's date: 2024  Patient name: Jessica Ricci  : 1950  MRN: [id number]  Referring provider: Alisa Florence DO  Dx:   Encounter Diagnosis     ICD-10-CM    1. Chronic right-sided low back pain without sciatica  M54.50     G89.29                      Subjective: Pt states that she is doing well today. Indicates that she was able to bake over this weekend without much pain in her back which has not happened in the long time. Is happy with how she has been feeling.       Objective: See treatment diary below      Assessment: Tolerated treatment well. Progressed core activation and strengthening activities today. Did demonstrate increase lumbar lordosis with dynamic activities; will not progress HEP due to form. Still benefiting from outpatient vs home physical therapy only for use of specialized equipment, skilled progressions, and manual therapy. Still has functional limitations in decreased standing/walking tolerance, difficulty with stair ambulation, and decreased quality of life associated with pain. Patient demonstrated fatigue post treatment and would benefit from continued PT      Plan: Continue per plan of care.      Precautions: GERD, Asthma    HEP: [id number]     Daily Treatment Diary    Date        FOTO IE -     JM *        Re-Eval IE               Manuals    Lumbar mobs             Hip flexor stretch   JM (B) Bilat - EH Bilat - EH JM (B) JM (B)                                 Neuro Re-Ed     PPT 2x10 10x  HEP          PPT with SLR  2x10 (B)  2x10  bilat 10x (B) legs extension HEP       PPT with bridge 2x10 10x  x10         Supine core ISO with TBall  2x10 Green TBall Seated  Pball  2x10 Supine  2x10  Supine 2x10 with single leg ext        Seated modified dead bug (with SL)     10x ea (march, extended, march) 10x    Full dead bug 1x ea (hold)                                 Ther Ex    Bike   NV  5 min  L1 5 min  L1 4 mins L1       Hamstring stretch   "30\"x2 (B)   30\"x2 (B) 30\"x2 (B)       Piriformis stretch   30\"x2 (B)           LTRs  2 mins   2 min 2 min 2 min       Standing lumbar shifts at wall   (L at wall)             Seated Tball roll outs  10x fwd 10x L Fwd  x10    To L  x10   10x fwd 10x L       Standing hip abduction with TB  OTB 2x8  OTB  2x8  bilat 25# 2x10 (B) 25# 2x10 (B)       Standing hip ext with TB  OTB 10x  OTB  x10  bilat 25# 15x (B) 25# 15x (B)       Standing hip flexor stretch  15\" hold 2x (B)            Leg press -  seat 5  55# 2x15 55#  2x15 65#  2x15 65#  2x15 65#  2x15       Side lying hip abd     2x10 (B) 1# HEP       Standing Pallof Press  Double green 2x10 (B) Double GTB    2x10   bilat Double  GTB    2x10  bilat Double  GTB  1x10  bilat 7# side stepping 2 steps 7x ea                                               Ther Activity                              Gait Training                              Modalities    Heat vs Ice PRN                                   "

## 2024-02-16 ENCOUNTER — OFFICE VISIT (OUTPATIENT)
Dept: PHYSICAL THERAPY | Facility: CLINIC | Age: 74
End: 2024-02-16
Payer: MEDICARE

## 2024-02-16 DIAGNOSIS — G89.29 CHRONIC RIGHT-SIDED LOW BACK PAIN WITHOUT SCIATICA: Primary | ICD-10-CM

## 2024-02-16 DIAGNOSIS — M54.50 CHRONIC RIGHT-SIDED LOW BACK PAIN WITHOUT SCIATICA: Primary | ICD-10-CM

## 2024-02-16 PROCEDURE — 97140 MANUAL THERAPY 1/> REGIONS: CPT

## 2024-02-16 PROCEDURE — 97112 NEUROMUSCULAR REEDUCATION: CPT

## 2024-02-16 PROCEDURE — 97110 THERAPEUTIC EXERCISES: CPT

## 2024-02-16 NOTE — PROGRESS NOTES
"Daily Note     Today's date: 2024  Patient name: Jessica Ricci  : 1950  MRN: [id number]  Referring provider: Alisa Florence DO  Dx:   Encounter Diagnosis     ICD-10-CM    1. Chronic right-sided low back pain without sciatica  M54.50     G89.29                      Subjective: Patient reported that her LB was a little tender on R along with her L knee following last session which she attributed to use of hip machine.      Objective: See treatment diary below.      Assessment: Requested holding Multihip due to symptoms following last session. Doing well with all other progressions for core strengthening. See plan below.      Plan: Continue per plan of care.   Scheduled for two visits next week and following that will discuss continuation or discharge.         Precautions: GERD, Asthma    HEP: [id number]     Daily Treatment Diary    Date       FOTO IE -     JM *        Re-Eval IE               Manuals    Lumbar mobs             Hip flexor stretch   JM (B) Bilat - EH Bilat - EH JM (B) JM (B) Bilat - EH                                Neuro Re-Ed     PPT 2x10 10x  HEP          PPT with SLR  2x10 (B)  2x10  bilat 10x (B) legs extension HEP       PPT with bridge 2x10 10x  x10         Supine core ISO with TBall  2x10 Green TBall Seated  Pball  2x10 Supine  2x10  Supine 2x10 with single leg ext  Supine with single leg ext    2x10      Seated modified dead bug (with SL)     10x ea (march, extended, march) 10x    Full dead bug 1x ea (hold) Full dead bug    x10                                Ther Ex    Bike   NV  5 min  L1 5 min  L1 4 mins L1 4 min  L1      Hamstring stretch  30\"x2 (B)   30\"x2 (B) 30\"x2 (B) 30\"x2  bilat      Piriformis stretch   30\"x2 (B)           LTRs  2 mins   2 min 2 min 2 min 2 min      Standing lumbar shifts at wall   (L at wall)             Seated Tball roll outs  10x fwd 10x L Fwd  x10    To L  x10   10x fwd 10x L Fwd and to L  10\"x  10      Standing hip " "abduction with TB  OTB 2x8  OTB  2x8  bilat 25# 2x10 (B) 25# 2x10 (B)       Standing hip ext with TB  OTB 10x  OTB  x10  bilat 25# 15x (B) 25# 15x (B)       Standing hip flexor stretch  15\" hold 2x (B)            Leg press -  seat 5  55# 2x15 55#  2x15 65#  2x15 65#  2x15 65#  2x15 65#  2x15      Side lying hip abd     2x10 (B) 1# HEP       Standing Pallof Press  Double green 2x10 (B) Double GTB    2x10   bilat Double  GTB    2x10  bilat Double  GTB  1x10  bilat 7# side stepping 2 steps 7x ea  Side step  at [place]    7.0 -  2 steps,   x7 bilat                                             Ther Activity                              Gait Training                              Modalities    Heat vs Ice PRN                               "

## 2024-02-20 ENCOUNTER — OFFICE VISIT (OUTPATIENT)
Dept: PHYSICAL THERAPY | Facility: CLINIC | Age: 74
End: 2024-02-20
Payer: MEDICARE

## 2024-02-20 DIAGNOSIS — G89.29 CHRONIC RIGHT-SIDED LOW BACK PAIN WITHOUT SCIATICA: Primary | ICD-10-CM

## 2024-02-20 DIAGNOSIS — M54.50 CHRONIC RIGHT-SIDED LOW BACK PAIN WITHOUT SCIATICA: Primary | ICD-10-CM

## 2024-02-20 PROCEDURE — 97110 THERAPEUTIC EXERCISES: CPT

## 2024-02-20 PROCEDURE — 97140 MANUAL THERAPY 1/> REGIONS: CPT

## 2024-02-20 NOTE — PROGRESS NOTES
"Daily Note     Today's date: 2024  Patient name: Jessica Ricci  : 1950  MRN: [id number]  Referring provider: Alisa Florence DO  Dx:   Encounter Diagnosis     ICD-10-CM    1. Chronic right-sided low back pain without sciatica  M54.50     G89.29                      Subjective: Pt states that she was able to lift some heavier furniture over the last weekend without to much pain; but with some residual soreness.       Objective: See treatment diary below      Assessment: Tolerated treatment well. Continues to show good progress with physical therapy and activity tolerance. Did not progress HEP today; will progress next session with potential discharge next session. Patient demonstrated fatigue post treatment and would benefit from continued PT      Plan: Continue per plan of care.  Potential discharge next visit.     Precautions: GERD, Asthma    HEP: [id number]     Daily Treatment Diary    Date      FOTO IE -     JM *        Re-Eval IE               Manuals    Lumbar mobs             Hip flexor stretch   JM (B) Bilat - EH Bilat - EH JM (B) JM (B) Bilat - EH JM (B)                               Neuro Re-Ed     PPT 2x10 10x  HEP          PPT with SLR  2x10 (B)  2x10  bilat 10x (B) legs extension HEP  2x10 (B) 1#     PPT with bridge 2x10 10x  x10    2x10     Supine core ISO with TBall  2x10 Green TBall Seated  Pball  2x10 Supine  2x10  Supine 2x10 with single leg ext  Supine with single leg ext    2x10      Seated modified dead bug (with SL)     10x ea (march, extended, march) 10x    Full dead bug 1x ea (hold) Full dead bug    x10                                Ther Ex    Bike   NV  5 min  L1 5 min  L1 4 mins L1 4 min  L1 4 min L3     Hamstring stretch  30\"x2 (B)   30\"x2 (B) 30\"x2 (B) 30\"x2  bilat 30\"x2  bilat     Piriformis stretch   30\"x2 (B)           LTRs  2 mins   2 min 2 min 2 min 2 min 2 min     Standing lumbar shifts at wall   (L at wall)             Seated " "Tball roll outs  10x fwd 10x L Fwd  x10    To L  x10   10x fwd 10x L Fwd and to L  10\"x  10 Fwd and to L  5\"x  10     Standing hip abduction with TB  OTB 2x8  OTB  2x8  bilat 25# 2x10 (B) 25# 2x10 (B)  25# 2x10 (B)     Standing hip ext with TB  OTB 10x  OTB  x10  bilat 25# 15x (B) 25# 15x (B)  25# 2x10 (B)     Standing hip flexor stretch  15\" hold 2x (B)            Leg press -  seat 5  55# 2x15 55#  2x15 65#  2x15 65#  2x15 65#  2x15 65#  2x15 75# 2x15     Side lying hip abd     2x10 (B) 1# HEP       Standing Pallof Press  Double green 2x10 (B) Double GTB    2x10   bilat Double  GTB    2x10  bilat Double  GTB  1x10  bilat 7# side stepping 2 steps 7x ea  Side step  at [place]    7.0 -  2 steps,   x7 bilat 7# 15x (B)     Hip hike on step        10x (B)                               Ther Activity                              Gait Training                              Modalities    Heat vs Ice PRN                                 "

## 2024-02-22 ENCOUNTER — OFFICE VISIT (OUTPATIENT)
Dept: PHYSICAL THERAPY | Facility: CLINIC | Age: 74
End: 2024-02-22
Payer: MEDICARE

## 2024-02-22 DIAGNOSIS — M54.50 CHRONIC RIGHT-SIDED LOW BACK PAIN WITHOUT SCIATICA: Primary | ICD-10-CM

## 2024-02-22 DIAGNOSIS — G89.29 CHRONIC RIGHT-SIDED LOW BACK PAIN WITHOUT SCIATICA: Primary | ICD-10-CM

## 2024-02-22 PROCEDURE — 97112 NEUROMUSCULAR REEDUCATION: CPT

## 2024-02-22 PROCEDURE — 97110 THERAPEUTIC EXERCISES: CPT

## 2024-02-22 NOTE — PROGRESS NOTES
Daily Note + Discharge Note    Today's date: 2024  Patient name: Jessica Ricci  : 1950  MRN: [id number]  Referring provider: Alisa Florence DO  Dx:   Encounter Diagnosis     ICD-10-CM    1. Chronic right-sided low back pain without sciatica  M54.50     G89.29                      Subjective: Pt states that she is feeling a little more sore and achy today. Feels that it is from the increased activity and exercises during last session. Reports that most of her discomfort mostly occurs at the end of the day.       Objective: See treatment diary below    Pain  Current pain ratin  At best pain ratin  At worst pain ratin  Quality: dull ache and pulling        Objective       Active Range of Motion     Lumbar   Flexion:  WFL   Extension:  with pain Restriction level: minimal   Left lateral flexion:  with pain Restriction level: minimal   Right lateral flexion:  with pain Restriction level: minimal   Left rotation:  Restriction level: minimal   Right rotation:  Restriction level: minimal       Joint Play     Hypomobile: L1, L2, L3, L4, L5 and S1     Strength/Myotome Testing     Left Hip   Planes of Motion   Flexion: 4  Extension: 4+  Abduction: 4+    Right Hip   Planes of Motion   Flexion: 4+  Extension: 4+  Abduction: 4    Left Knee   Flexion: 4+  Extension: 4+    Right Knee   Flexion: 4+  Extension: 4+    Left Ankle/Foot   Dorsiflexion: 4+  Plantar flexion: 4+    Right Ankle/Foot   Dorsiflexion: 4+  Plantar flexion: 4+      Assessment: Tolerated treatment well. Patient demonstrated fatigue post treatment and would benefit from continued PT. Pt has progressed well with physical therapy and with HEP. Most significant improvements made with TA activation and with hip extension strength with MMTing. Plan to discharge patient at this time due to meeting personal and LTGs. Recommended to perform the HEP at least 3x a week.       Plan: Advised patient to continue with home exercise program which I  "reviewed with them today. Advised patient to contact me with any future questions or concerns regarding exercise. Pt is in agreement with discharge plan.       Precautions: GERD, Asthma    HEP: [id number]     Daily Treatment Diary    Date 1/26 1/29 1/31 2/5 2/9 2/14 2/16 2/20 2/22    FOTO IE -     JM *        Re-Eval IE               Manuals    Lumbar mobs             Hip flexor stretch   JM (B) Bilat - EH Bilat - EH JM (B) JM (B) Bilat - EH JM (B)                               Neuro Re-Ed     PPT 2x10 10x  HEP          PPT with SLR  2x10 (B)  2x10  bilat 10x (B) legs extension HEP  2x10 (B) 1# 2x10 (B) 1#    PPT with bridge 2x10 10x  x10    2x10 2x10    Supine core ISO with TBall  2x10 Green TBall Seated  Pball  2x10 Supine  2x10  Supine 2x10 with single leg ext  Supine with single leg ext    2x10      Seated modified dead bug (with SL)     10x ea (march, extended, march) 10x    Full dead bug 1x ea (hold) Full dead bug    x10                                Ther Ex    Bike   NV  5 min  L1 5 min  L1 4 mins L1 4 min  L1 4 min L3 4 min  L2    Hamstring stretch  30\"x2 (B)   30\"x2 (B) 30\"x2 (B) 30\"x2  bilat 30\"x2  bilat 30\"x2  bilat    Piriformis stretch   30\"x2 (B)           LTRs  2 mins   2 min 2 min 2 min 2 min 2 min 2 min    Standing lumbar shifts at wall   (L at wall)             Seated Tball roll outs  10x fwd 10x L Fwd  x10    To L  x10   10x fwd 10x L Fwd and to L  10\"x  10 Fwd and to L  5\"x  10 Fwd and to L  5\"x  10 HEP   Standing hip abduction with TB  OTB 2x8  OTB  2x8  bilat 25# 2x10 (B) 25# 2x10 (B)  25# 2x10 (B) OTB  2x10  bilat HEP   Standing hip ext with TB  OTB 10x  OTB  x10  bilat 25# 15x (B) 25# 15x (B)  25# 2x10 (B) OTB  2x10  bilat HEP   Standing hip flexor stretch  15\" hold 2x (B)            Leg press -  seat 5  55# 2x15 55#  2x15 65#  2x15 65#  2x15 65#  2x15 65#  2x15 75# 2x15 75# 2x15    Side lying hip abd     2x10 (B) 1# HEP       Standing Pallof Press  Double green 2x10 (B) Double GTB    2x10   " bilat Double  GTB    2x10  bilat Double  GTB  1x10  bilat 7# side stepping 2 steps 7x ea  Side step  at [place]    7.0 -  2 steps,   x7 bilat 7# 15x (B) 7# 15x (B)    Hip hike on step        10x (B) 10x (B)                              Ther Activity                              Gait Training                              Modalities    Heat vs Ice PRN

## 2024-04-25 DIAGNOSIS — J45.20 MILD INTERMITTENT ASTHMA, UNSPECIFIED WHETHER COMPLICATED: ICD-10-CM

## 2024-04-26 RX ORDER — LEVALBUTEROL TARTRATE 45 UG/1
1 AEROSOL, METERED ORAL EVERY 4 HOURS PRN
Qty: 15 G | Refills: 1 | Status: SHIPPED | OUTPATIENT
Start: 2024-04-26

## 2024-05-20 ENCOUNTER — OFFICE VISIT (OUTPATIENT)
Dept: FAMILY MEDICINE CLINIC | Facility: CLINIC | Age: 74
End: 2024-05-20
Payer: MEDICARE

## 2024-05-20 VITALS
BODY MASS INDEX: 28.6 KG/M2 | WEIGHT: 161.4 LBS | SYSTOLIC BLOOD PRESSURE: 142 MMHG | TEMPERATURE: 97.4 F | DIASTOLIC BLOOD PRESSURE: 80 MMHG | HEART RATE: 78 BPM | HEIGHT: 63 IN | OXYGEN SATURATION: 98 %

## 2024-05-20 DIAGNOSIS — Z01.419 ROUTINE GYNECOLOGICAL EXAMINATION: ICD-10-CM

## 2024-05-20 DIAGNOSIS — E78.2 MIXED HYPERLIPIDEMIA: ICD-10-CM

## 2024-05-20 DIAGNOSIS — E53.8 VITAMIN B12 DEFICIENCY: ICD-10-CM

## 2024-05-20 DIAGNOSIS — Z00.00 MEDICARE ANNUAL WELLNESS VISIT, SUBSEQUENT: Primary | ICD-10-CM

## 2024-05-20 DIAGNOSIS — E55.9 VITAMIN D DEFICIENCY: ICD-10-CM

## 2024-05-20 PROCEDURE — G0439 PPPS, SUBSEQ VISIT: HCPCS | Performed by: FAMILY MEDICINE

## 2024-05-20 NOTE — PATIENT INSTRUCTIONS
Medicare Preventive Visit Patient Instructions  Thank you for completing your Welcome to Medicare Visit or Medicare Annual Wellness Visit today. Your next wellness visit will be due in one year (5/21/2025).  The screening/preventive services that you may require over the next 5-10 years are detailed below. Some tests may not apply to you based off risk factors and/or age. Screening tests ordered at today's visit but not completed yet may show as past due. Also, please note that scanned in results may not display below.  Preventive Screenings:  Service Recommendations Previous Testing/Comments   Colorectal Cancer Screening  * Colonoscopy    * Fecal Occult Blood Test (FOBT)/Fecal Immunochemical Test (FIT)  * Fecal DNA/Cologuard Test  * Flexible Sigmoidoscopy Age: 45-75 years old   Colonoscopy: every 10 years (may be performed more frequently if at higher risk)  OR  FOBT/FIT: every 1 year  OR  Cologuard: every 3 years  OR  Sigmoidoscopy: every 5 years  Screening may be recommended earlier than age 45 if at higher risk for colorectal cancer. Also, an individualized decision between you and your healthcare provider will decide whether screening between the ages of 76-85 would be appropriate. Colonoscopy: 10/13/2015  FOBT/FIT: Not on file  Cologuard: Not on file  Sigmoidoscopy: Not on file    Screening Current     Breast Cancer Screening Age: 40+ years old  Frequency: every 1-2 years  Not required if history of left and right mastectomy Mammogram: 12/29/2023    Screening Current   Cervical Cancer Screening Between the ages of 21-29, pap smear recommended once every 3 years.   Between the ages of 30-65, can perform pap smear with HPV co-testing every 5 years.   Recommendations may differ for women with a history of total hysterectomy, cervical cancer, or abnormal pap smears in past. Pap Smear: 10/05/2021    Screening Not Indicated   Hepatitis C Screening Once for adults born between 1945 and 1965  More frequently in  patients at high risk for Hepatitis C Hep C Antibody: Not on file    Screening Current   Diabetes Screening 1-2 times per year if you're at risk for diabetes or have pre-diabetes Fasting glucose: 92 mg/dL (5/12/2023)  A1C: No results in last 5 years (No results in last 5 years)      Cholesterol Screening Once every 5 years if you don't have a lipid disorder. May order more often based on risk factors. Lipid panel: 05/12/2023    Screening Not Indicated  History Lipid Disorder     Other Preventive Screenings Covered by Medicare:  Abdominal Aortic Aneurysm (AAA) Screening: covered once if your at risk. You're considered to be at risk if you have a family history of AAA.  Lung Cancer Screening: covers low dose CT scan once per year if you meet all of the following conditions: (1) Age 55-77; (2) No signs or symptoms of lung cancer; (3) Current smoker or have quit smoking within the last 15 years; (4) You have a tobacco smoking history of at least 20 pack years (packs per day multiplied by number of years you smoked); (5) You get a written order from a healthcare provider.  Glaucoma Screening: covered annually if you're considered high risk: (1) You have diabetes OR (2) Family history of glaucoma OR (3)  aged 50 and older OR (4)  American aged 65 and older  Osteoporosis Screening: covered every 2 years if you meet one of the following conditions: (1) You're estrogen deficient and at risk for osteoporosis based off medical history and other findings; (2) Have a vertebral abnormality; (3) On glucocorticoid therapy for more than 3 months; (4) Have primary hyperparathyroidism; (5) On osteoporosis medications and need to assess response to drug therapy.   Last bone density test (DXA Scan): 11/02/2023.  HIV Screening: covered annually if you're between the age of 15-65. Also covered annually if you are younger than 15 and older than 65 with risk factors for HIV infection. For pregnant patients, it is  covered up to 3 times per pregnancy.    Immunizations:  Immunization Recommendations   Influenza Vaccine Annual influenza vaccination during flu season is recommended for all persons aged >= 6 months who do not have contraindications   Pneumococcal Vaccine   * Pneumococcal conjugate vaccine = PCV13 (Prevnar 13), PCV15 (Vaxneuvance), PCV20 (Prevnar 20)  * Pneumococcal polysaccharide vaccine = PPSV23 (Pneumovax) Adults 19-63 yo with certain risk factors or if 65+ yo  If never received any pneumonia vaccine: recommend Prevnar 20 (PCV20)  Give PCV20 if previously received 1 dose of PCV13 or PPSV23   Hepatitis B Vaccine 3 dose series if at intermediate or high risk (ex: diabetes, end stage renal disease, liver disease)   Respiratory syncytial virus (RSV) Vaccine - COVERED BY MEDICARE PART D  * RSVPreF3 (Arexvy) CDC recommends that adults 60 years of age and older may receive a single dose of RSV vaccine using shared clinical decision-making (SCDM)   Tetanus (Td) Vaccine - COST NOT COVERED BY MEDICARE PART B Following completion of primary series, a booster dose should be given every 10 years to maintain immunity against tetanus. Td may also be given as tetanus wound prophylaxis.   Tdap Vaccine - COST NOT COVERED BY MEDICARE PART B Recommended at least once for all adults. For pregnant patients, recommended with each pregnancy.   Shingles Vaccine (Shingrix) - COST NOT COVERED BY MEDICARE PART B  2 shot series recommended in those 19 years and older who have or will have weakened immune systems or those 50 years and older     Health Maintenance Due:      Topic Date Due   • Hepatitis C Screening  05/02/2030 (Originally 1950)   • Breast Cancer Screening: Mammogram  12/29/2024   • Colorectal Cancer Screening  10/13/2025     Immunizations Due:  There are no preventive care reminders to display for this patient.  Advance Directives   What are advance directives?  Advance directives are legal documents that state your  wishes and plans for medical care. These plans are made ahead of time in case you lose your ability to make decisions for yourself. Advance directives can apply to any medical decision, such as the treatments you want, and if you want to donate organs.   What are the types of advance directives?  There are many types of advance directives, and each state has rules about how to use them. You may choose a combination of any of the following:  Living will:  This is a written record of the treatment you want. You can also choose which treatments you do not want, which to limit, and which to stop at a certain time. This includes surgery, medicine, IV fluid, and tube feedings.   Durable power of  for healthcare (DPAHC):  This is a written record that states who you want to make healthcare choices for you when you are unable to make them for yourself. This person, called a proxy, is usually a family member or a friend. You may choose more than 1 proxy.  Do not resuscitate (DNR) order:  A DNR order is used in case your heart stops beating or you stop breathing. It is a request not to have certain forms of treatment, such as CPR. A DNR order may be included in other types of advance directives.  Medical directive:  This covers the care that you want if you are in a coma, near death, or unable to make decisions for yourself. You can list the treatments you want for each condition. Treatment may include pain medicine, surgery, blood transfusions, dialysis, IV or tube feedings, and a ventilator (breathing machine).  Values history:  This document has questions about your views, beliefs, and how you feel and think about life. This information can help others choose the care that you would choose.  Why are advance directives important?  An advance directive helps you control your care. Although spoken wishes may be used, it is better to have your wishes written down. Spoken wishes can be misunderstood, or not followed.  Treatments may be given even if you do not want them. An advance directive may make it easier for your family to make difficult choices about your care.   Weight Management   Why it is important to manage your weight:  Being overweight increases your risk of health conditions such as heart disease, high blood pressure, type 2 diabetes, and certain types of cancer. It can also increase your risk for osteoarthritis, sleep apnea, and other respiratory problems. Aim for a slow, steady weight loss. Even a small amount of weight loss can lower your risk of health problems.  How to lose weight safely:  A safe and healthy way to lose weight is to eat fewer calories and get regular exercise. You can lose up about 1 pound a week by decreasing the number of calories you eat by 500 calories each day.   Healthy meal plan for weight management:  A healthy meal plan includes a variety of foods, contains fewer calories, and helps you stay healthy. A healthy meal plan includes the following:  Eat whole-grain foods more often.  A healthy meal plan should contain fiber. Fiber is the part of grains, fruits, and vegetables that is not broken down by your body. Whole-grain foods are healthy and provide extra fiber in your diet. Some examples of whole-grain foods are whole-wheat breads and pastas, oatmeal, brown rice, and bulgur.  Eat a variety of vegetables every day.  Include dark, leafy greens such as spinach, kale, leena greens, and mustard greens. Eat yellow and orange vegetables such as carrots, sweet potatoes, and winter squash.   Eat a variety of fruits every day.  Choose fresh or canned fruit (canned in its own juice or light syrup) instead of juice. Fruit juice has very little or no fiber.  Eat low-fat dairy foods.  Drink fat-free (skim) milk or 1% milk. Eat fat-free yogurt and low-fat cottage cheese. Try low-fat cheeses such as mozzarella and other reduced-fat cheeses.  Choose meat and other protein foods that are low in fat.   Choose beans or other legumes such as split peas or lentils. Choose fish, skinless poultry (chicken or turkey), or lean cuts of red meat (beef or pork). Before you cook meat or poultry, cut off any visible fat.   Use less fat and oil.  Try baking foods instead of frying them. Add less fat, such as margarine, sour cream, regular salad dressing and mayonnaise to foods. Eat fewer high-fat foods. Some examples of high-fat foods include french fries, doughnuts, ice cream, and cakes.  Eat fewer sweets.  Limit foods and drinks that are high in sugar. This includes candy, cookies, regular soda, and sweetened drinks.  Exercise:  Exercise at least 30 minutes per day on most days of the week. Some examples of exercise include walking, biking, dancing, and swimming. You can also fit in more physical activity by taking the stairs instead of the elevator or parking farther away from stores. Ask your healthcare provider about the best exercise plan for you.      © Copyright [employer] 2018 Information is for End User's use only and may not be sold, redistributed or otherwise used for commercial purposes. All illustrations and images included in CareNotes® are the copyrighted property of [employer] Inc[employer] or [employer]

## 2024-05-20 NOTE — PROGRESS NOTES
Ambulatory Visit  Name: Jessica Ricci      : 1950      MRN: [id number]  Encounter Provider: Emi Blunt DO  Encounter Date: 2024   Encounter department: [facility] PRIMARY CARE    Assessment & Plan   1. Medicare annual wellness visit, subsequent  2. Vitamin B12 deficiency  -     Vitamin B12; Future  3. Vitamin D deficiency  -     Vitamin D 25 hydroxy; Future  4. Mixed hyperlipidemia  -     Lipid Panel with Direct LDL reflex; Future  -     Comprehensive metabolic panel; Future  -     TSH, 3rd generation; Future  5. Routine gynecological examination  -     Ambulatory Referral to Obstetrics / Gynecology; Future      Depression Screening and Follow-up Plan: Patient was screened for depression during today's encounter. They screened negative with a PHQ-2 score of 0.      Preventive health issues were discussed with patient, and age appropriate screening tests were ordered as noted in patient's After Visit Summary. Personalized health advice and appropriate referrals for health education or preventive services given if needed, as noted in patient's After Visit Summary.    History of Present Illness     73-year-old female here for Medicare wellness.busy with doing dog handling    Yearly labs will be due.  Patient Care Team:  Emi Blunt DO as PCP - General  Shakir Greco MD    Review of Systems   Musculoskeletal:  Positive for back pain (on and off effects right lower- xrays done, went tp PT-helpful).        Sticks with tylenol and occasional IBU       DERM in 2024  Dental UTD  EYE UTD    LUMBAR SPINE     INDICATION:   Low back pain, unspecified. Other chronic pain. Other specified disorders of bone density and structure, unspecified site.      COMPARISON:  There are no previous examinations available for comparison.     VIEWS:  XR SPINE LUMBAR MINIMUM 4 VIEWS NON INJURY  Images: 5     FINDINGS:     There are 5 non rib bearing lumbar vertebral bodies.      There is no evidence of  acute fracture or destructive osseous lesion.     Mild scoliotic deformity is noted. Grade 1 anterolisthesis L5 on S1 with stepwise retrolisthesis L1-2, L2-3 and L3-4.      Multilevel disc space narrowing most advanced L4-5 and L2-3 with multilevel advanced facet arthropathy.     The pedicles appear intact.     Soft tissues are unremarkable.     Medical History Reviewed by provider this encounter:  Tobacco  Allergies  Meds  Problems  Med Hx  Surg Hx  Fam Hx       Annual Wellness Visit Questionnaire       Health Risk Assessment:   Patient rates overall health as excellent. Patient feels that their physical health rating is same. Patient is very satisfied with their life. Eyesight was rated as same. Hearing was rated as same. Patient feels that their emotional and mental health rating is same. Patients states they are never, rarely angry. Patient states they are never, rarely unusually tired/fatigued. Pain experienced in the last 7 days has been some. Patient's pain rating has been 4/10. Patient states that she has experienced no weight loss or gain in last 6 months.     Depression Screening:   PHQ-2 Score: 0      Fall Risk Screening:   In the past year, patient has experienced: no history of falling in past year      Urinary Incontinence Screening:   Patient has not leaked urine accidently in the last six months.     Home Safety:  Patient does not have trouble with stairs inside or outside of their home. Patient has working smoke alarms and has working carbon monoxide detector. Home safety hazards include: none.     Nutrition:   Current diet is Regular.     Medications:   Patient is currently taking over-the-counter supplements. OTC medications include: see medication list. Patient is able to manage medications.     Activities of Daily Living (ADLs)/Instrumental Activities of Daily Living (IADLs):   Walk and transfer into and out of bed and chair?: Yes  Dress and groom yourself?: Yes    Bathe or shower  yourself?: Yes    Feed yourself? Yes  Do your laundry/housekeeping?: Yes  Manage your money, pay your bills and track your expenses?: Yes  Make your own meals?: Yes    Do your own shopping?: Yes    Previous Hospitalizations:   Any hospitalizations or ED visits within the last 12 months?: No      Advance Care Planning:   Living will: Yes    Durable POA for healthcare: Yes    Advanced directive: Yes    End of Life Decisions reviewed with patient: Yes    Provider agrees with end of life decisions: Yes      Cognitive Screening:   Provider or family/friend/caregiver concerned regarding cognition?: No    PREVENTIVE SCREENINGS      Cardiovascular Screening:    General: Risks and Benefits Discussed    Due for: Lipid Panel      Diabetes Screening:     General: Screening Current      Colorectal Cancer Screening:     General: Screening Current      Breast Cancer Screening:     General: Screening Current      Cervical Cancer Screening:    General: Screening Not Indicated      Osteoporosis Screening:    General: Screening Current      Abdominal Aortic Aneurysm (AAA) Screening:        General: Screening Not Indicated      Lung Cancer Screening:     General: Screening Not Indicated      Hepatitis C Screening:    General: Screening Current and Screening Not Indicated    Hep C Screening Accepted: No     Screening, Brief Intervention, and Referral to Treatment (SBIRT)    Screening  Typical number of drinks in a day: 0  Typical number of drinks in a week: 0  Interpretation: Low risk drinking behavior.    AUDIT-C Screenin) How often did you have a drink containing alcohol in the past year? monthly or less  2) How many drinks did you have on a typical day when you were drinking in the past year? 0  3) How often did you have 6 or more drinks on one occasion in the past year? never    AUDIT-C Score: 1  Interpretation: Score 0-2 (female): Negative screen for alcohol misuse    Single Item Drug Screening:  How often have you used an  "illegal drug (including marijuana) or a prescription medication for non-medical reasons in the past year? never    Single Item Drug Screen Score: 0  Interpretation: Negative screen for possible drug use disorder    Social Determinants of Health     Financial Resource Strain: Low Risk  (5/13/2023)    Overall Financial Resource Strain (CARDIA)    • Difficulty of Paying Living Expenses: Not hard at all   Food Insecurity: No Food Insecurity (5/15/2024)    Hunger Vital Sign    • Worried About Running Out of Food in the Last Year: Never true    • Ran Out of Food in the Last Year: Never true   Transportation Needs: No Transportation Needs (5/15/2024)    PRAPARE - Transportation    • Lack of Transportation (Medical): No    • Lack of Transportation (Non-Medical): No   Housing Stability: Unknown (5/15/2024)    Housing Stability Vital Sign    • Unable to Pay for Housing in the Last Year: No    • Homeless in the Last Year: No   Utilities: Not At Risk (5/15/2024)    [facility] Utilities    • Threatened with loss of utilities: No     No results found.    Objective     /80   Pulse 78   Temp (!) 97.4 °F (36.3 °C)   Ht 5' 2.5\" (1.588 m)   Wt 73.2 kg (161 lb 6.4 oz)   LMP  (LMP Unknown)   SpO2 98%   BMI 29.05 kg/m²     Physical Exam  Vitals and nursing note reviewed.   Constitutional:       General: She is not in acute distress.     Appearance: Normal appearance. She is well-developed.      Comments: 73-year-old female who appears older than stated age   HENT:      Head: Normocephalic.      Right Ear: Tympanic membrane normal.      Left Ear: Tympanic membrane normal.      Mouth/Throat:      Mouth: Mucous membranes are moist.   Eyes:      Conjunctiva/sclera: Conjunctivae normal.   Neck:      Vascular: No carotid bruit.   Cardiovascular:      Rate and Rhythm: Normal rate and regular rhythm.      Heart sounds: No murmur heard.  Pulmonary:      Effort: Pulmonary effort is normal. No respiratory distress.      Breath sounds: Normal " breath sounds.   Abdominal:      Palpations: Abdomen is soft.      Tenderness: There is no abdominal tenderness.      Comments: No bruit   Musculoskeletal:         General: No swelling. Normal range of motion.   Skin:     Findings: No rash.   Neurological:      Mental Status: She is alert and oriented to person, place, and time.   Psychiatric:         Mood and Affect: Mood normal.         Behavior: Behavior normal.         Thought Content: Thought content normal.         Judgment: Judgment normal.       Administrative Statements

## 2024-05-21 ENCOUNTER — APPOINTMENT (OUTPATIENT)
Dept: LAB | Facility: CLINIC | Age: 74
End: 2024-05-21
Payer: MEDICARE

## 2024-05-21 DIAGNOSIS — E53.8 VITAMIN B12 DEFICIENCY: ICD-10-CM

## 2024-05-21 DIAGNOSIS — E55.9 VITAMIN D DEFICIENCY: ICD-10-CM

## 2024-05-21 DIAGNOSIS — E78.2 MIXED HYPERLIPIDEMIA: ICD-10-CM

## 2024-05-21 LAB
25(OH)D3 SERPL-MCNC: 38.5 NG/ML (ref 30–100)
ALBUMIN SERPL BCP-MCNC: 3.9 G/DL (ref 3.5–5)
ALP SERPL-CCNC: 47 U/L (ref 34–104)
ALT SERPL W P-5'-P-CCNC: 13 U/L (ref 7–52)
ANION GAP SERPL CALCULATED.3IONS-SCNC: 10 MMOL/L (ref 4–13)
AST SERPL W P-5'-P-CCNC: 15 U/L (ref 13–39)
BILIRUB SERPL-MCNC: 0.66 MG/DL (ref 0.2–1)
BUN SERPL-MCNC: 13 MG/DL (ref 5–25)
CALCIUM SERPL-MCNC: 8.7 MG/DL (ref 8.4–10.2)
CHLORIDE SERPL-SCNC: 104 MMOL/L (ref 96–108)
CHOLEST SERPL-MCNC: 220 MG/DL
CO2 SERPL-SCNC: 28 MMOL/L (ref 21–32)
CREAT SERPL-MCNC: 0.65 MG/DL (ref 0.6–1.3)
GFR SERPL CREATININE-BSD FRML MDRD: 87 ML/MIN/1.73SQ M
GLUCOSE P FAST SERPL-MCNC: 92 MG/DL (ref 65–99)
HDLC SERPL-MCNC: 66 MG/DL
LDLC SERPL CALC-MCNC: 132 MG/DL (ref 0–100)
POTASSIUM SERPL-SCNC: 4 MMOL/L (ref 3.5–5.3)
PROT SERPL-MCNC: 6.5 G/DL (ref 6.4–8.4)
SODIUM SERPL-SCNC: 142 MMOL/L (ref 135–147)
TRIGL SERPL-MCNC: 109 MG/DL
TSH SERPL DL<=0.05 MIU/L-ACNC: 2.34 UIU/ML (ref 0.45–4.5)
VIT B12 SERPL-MCNC: 478 PG/ML (ref 180–914)

## 2024-05-21 PROCEDURE — 82607 VITAMIN B-12: CPT

## 2024-05-21 PROCEDURE — 80053 COMPREHEN METABOLIC PANEL: CPT

## 2024-05-21 PROCEDURE — 82306 VITAMIN D 25 HYDROXY: CPT

## 2024-05-21 PROCEDURE — 36415 COLL VENOUS BLD VENIPUNCTURE: CPT

## 2024-05-21 PROCEDURE — 84443 ASSAY THYROID STIM HORMONE: CPT

## 2024-05-21 PROCEDURE — 80061 LIPID PANEL: CPT

## 2024-07-04 DIAGNOSIS — J30.89 ALLERGIC RHINITIS DUE TO OTHER ALLERGIC TRIGGER, UNSPECIFIED SEASONALITY: ICD-10-CM

## 2024-07-05 RX ORDER — FLUTICASONE PROPIONATE 50 MCG
2 SPRAY, SUSPENSION (ML) NASAL AS NEEDED
Qty: 48 ML | Refills: 1 | Status: SHIPPED | OUTPATIENT
Start: 2024-07-05

## 2024-07-23 ENCOUNTER — ANNUAL EXAM (OUTPATIENT)
Dept: OBGYN CLINIC | Facility: MEDICAL CENTER | Age: 74
End: 2024-07-23
Payer: MEDICARE

## 2024-07-23 VITALS — WEIGHT: 161.4 LBS | HEIGHT: 63 IN | BODY MASS INDEX: 28.6 KG/M2

## 2024-07-23 DIAGNOSIS — Z12.31 ENCOUNTER FOR SCREENING MAMMOGRAM FOR MALIGNANT NEOPLASM OF BREAST: Primary | ICD-10-CM

## 2024-07-23 DIAGNOSIS — Z01.419 ROUTINE GYNECOLOGICAL EXAMINATION: ICD-10-CM

## 2024-07-23 PROCEDURE — G0101 CA SCREEN;PELVIC/BREAST EXAM: HCPCS | Performed by: OBSTETRICS & GYNECOLOGY

## 2024-07-23 NOTE — PROGRESS NOTES
ASSESSMENT & PLAN: Jessica was seen today for gynecologic exam.    Diagnoses and all orders for this visit:    Encounter for screening mammogram for malignant neoplasm of breast  -     Mammo screening bilateral w 3d & cad; Future    Routine gynecological examination  -     Ambulatory Referral to Obstetrics / Gynecology      1.  Routine well woman exam done today.  2.  Pap and HPV:  Pap and cotesting was not done today.  Current ASCCP Guidelines reviewed.  3.  Mammogram was ordered.  4.  Colorectal cancer screening is up to date.    5.  DEXA is up to date.  DEXA was not ordered today.  6. The following were reviewed in today's visit: adequate intake of calcium and vitamin D, exercise, and healthy diet.  7.  F/u  1-2 years for next routine GYN exam.      CC:  Annual Gynecologic Examination    HPI: Jessica Ricci is a 73 y.o. who presents for annual gynecologic examination.  She has the following concerns:  no c/o.  Is having a hard time losing weight.    Health Maintenance:    Patient describes her health as good.  Patient has weight concerns.  She exercises  5-7  days per week with walks 1 1/2 miles a day with her dog.    She does wear her seatbelt routinely.    She does perform regular monthly self breast exams.    She does feel safe at home.   Patients does follow a healthy diet.  Patient gets 1 servings of dairy or calcium rich foods daily.      Last pap: s/p hyster  Last mammogram:   Last colorectal cancer screenin  Last DEXA:  nl    Patient Active Problem List   Diagnosis    Allergic rhinitis    Arthritis    Asthma    Gastroesophageal reflux disease without esophagitis    Mixed hyperlipidemia    Osteoarthritis    Osteopenia    Rotator cuff syndrome of right shoulder    Vitamin B12 deficiency    Vitamin D deficiency    Spider veins of both lower extremities    Chronic right-sided low back pain without sciatica       Past Medical History:   Diagnosis Date    Abnormal electrocardiogram     Prolonged  QT    Asthma     GERD (gastroesophageal reflux disease)     Glaucoma     Shortness of breath     Last assessed 03/12/14    Swallowing difficulty        Past Surgical History:   Procedure Laterality Date    CATARACT EXTRACTION Bilateral     COLONOSCOPY      CYST REMOVAL      ovarian    ENDOMETRIAL BIOPSY      HEMORROIDECTOMY      HYSTERECTOMY      KNEE ARTHROSCOPY W/ MENISCAL REPAIR Left 03/09/2020    [place] ESOPHAGOGASTRODUODENOSCOPY TRANSORAL DIAGNOSTIC N/A 10/24/2017    Procedure: ESOPHAGOGASTRODUODENOSCOPY (EGD);  Surgeon: Shakir Greco MD;  Location: [facility];  Service: Gastroenterology    SHOULDER SURGERY Bilateral     rotator cuff, pinned on the LEFT    TONSILLECTOMY      TOTAL ABDOMINAL HYSTERECTOMY W/ BILATERAL SALPINGOOPHORECTOMY         Past OB/Gyn History:    Patient is not currently sexually active.  heterosexual.    Family History   Problem Relation Age of Onset    Diabetes Mother     Hypertension Mother     Skin cancer Mother     Heart disease Family     Other Family         CVA    No Known Problems Father     Skin cancer Sister     No Known Problems Daughter     No Known Problems Maternal Grandmother     No Known Problems Maternal Grandfather     No Known Problems Paternal Grandmother     No Known Problems Paternal Grandfather     Prostate cancer Brother 66    No Known Problems Sister     No Known Problems Daughter     No Known Problems Maternal Aunt     No Known Problems Maternal Aunt     No Known Problems Maternal Aunt     No Known Problems Maternal Aunt     No Known Problems Maternal Aunt     No Known Problems Paternal Aunt        Social History:   Social History     Socioeconomic History    Marital status:      Spouse name: Not on file    Number of children: Not on file    Years of education: Not on file    Highest education level: Not on file   Occupational History    Not on file   Tobacco Use    Smoking status: Never    Smokeless tobacco: Never   Vaping Use    Vaping status: Never Used    Substance and Sexual Activity    Alcohol use: Yes     Comment: occasionally    Drug use: No    Sexual activity: Not Currently     Partners: Male   Other Topics Concern    Not on file   Social History Narrative    Not on file     Social Determinants of Health     Financial Resource Strain: Low Risk  (5/13/2023)    Overall Financial Resource Strain (CARDIA)     Difficulty of Paying Living Expenses: Not hard at all   Food Insecurity: No Food Insecurity (5/15/2024)    Hunger Vital Sign     Worried About Running Out of Food in the Last Year: Never true     Ran Out of Food in the Last Year: Never true   Transportation Needs: No Transportation Needs (5/15/2024)    PRAPARE - Transportation     Lack of Transportation (Medical): No     Lack of Transportation (Non-Medical): No   Physical Activity: Not on file   Stress: Not on file   Social Connections: Not on file   Intimate Partner Violence: Not on file   Housing Stability: Unknown (5/15/2024)    Housing Stability Vital Sign     Unable to Pay for Housing in the Last Year: No     Number of Times Moved in the Last Year: Not on file     Homeless in the Last Year: No     Presently lives with .  Patient is currently employed semi-retired, dog training.    Allergies   Allergen Reactions    Amitriptyline     Seasonal Ic [Cholestatin]     Montelukast          Current Outpatient Medications:     Ascorbic Acid (vitamin C) 1000 MG tablet, Take 1,000 mg by mouth daily  , Disp: , Rfl:     bimatoprost (LUMIGAN) 0.01 % ophthalmic drops, Administer 1 drop to both eyes daily at bedtime, Disp: , Rfl:     BIOTIN PO, Take by mouth, Disp: , Rfl:     calcium carbonate 1250 MG capsule, Take 1,250 mg by mouth daily, Disp: , Rfl:     Cholecalciferol (VITAMIN D3) 5000 units TABS, Take by mouth  , Disp: , Rfl:     cyanocobalamin (VITAMIN B-12) 1,000 mcg tablet, Take by mouth daily  , Disp: , Rfl:     fluticasone (FLONASE) 50 mcg/act nasal spray, 2 sprays into each nostril as needed for  "rhinitis or allergies, Disp: 48 mL, Rfl: 1    levalbuterol (Xopenex HFA) 45 mcg/act inhaler, Inhale 1 puff every 4 (four) hours as needed for wheezing, Disp: 15 g, Rfl: 1    Review of Systems  Constitutional :no fever, feels well, no tiredness, no recent weight gain or loss  ENT: no ear ache, no loss of hearing, no nosebleeds or nasal discharge, no sore throat or hoarseness.  Cardiovascular: no complaints of slow or fast heart beat, no chest pain, no palpitations, no leg claudication or lower extremity edema.  Respiratory: no complaints of shortness of shortness of breath, no GAGNON  Breasts:no complaints of breast pain, breast lump, or nipple discharge  Gastrointestinal: no complaints of abdominal pain, constipation, nausea, vomiting, or diarrhea or bloody stools  Genitourinary : no complaints of dysuria, incontinence, pelvic pain, no dysmenorrhea, vaginal discharge or abnormal vaginal bleeding and as noted in HPI.  Musculoskeletal: no complaints of arthralgia, no myalgia, no joint swelling or stiffness, no limb pain or swelling.  Integumentary: no complaints of skin rash or lesion, itching or dry skin  Neurological: no complaints of headache, no confusion, no numbness or tingling, no dizziness or fainting     Physical Exam:   Ht 5' 2.5\" (1.588 m)   Wt 73.2 kg (161 lb 6.4 oz)   LMP  (LMP Unknown)   BMI 29.05 kg/m²     General: appears stated age, cooperative, alert normal mood and affect   Psychiatric oriented to person, place and time.  Mood and affect normal   Neck: normal, supple,trachea midline, no masses.  Thyroid: normal, no thyromegaly   Heart: regular rate and rhythm, S1, S2 normal, no murmur, click, rub or gallop   Lungs: clear to auscultation bilaterally, no increased work of breathing or signs of respiratory distress   Breasts: normal, no dimpling or skin changes noted   Abdomen: soft, non-tender, without masses or organomegaly   Vulva: normal , no lesions   Vagina: normal , no lesions, mild atrophy "   Urethra: normal   Urethal meatus normal   Bladder Normal, soft, non-tender and no prolapse or masses appreciated   Cervix: Surgically absent   Uterus: Surgically absent   Adnexa: normal, non-tender without fullness or masses   Lymphatic Palpation of lymph nodes in neck, axilla, groin and/or other locations: no lymphadenopathy or masses noted   Skin Normal skin turgor and no rashes.  Palpation of skin and subcutaneous tissue normal.

## 2024-09-11 ENCOUNTER — TELEPHONE (OUTPATIENT)
Dept: FAMILY MEDICINE CLINIC | Facility: CLINIC | Age: 74
End: 2024-09-11

## 2024-09-11 NOTE — TELEPHONE ENCOUNTER
09/11/24 3:02 PM    Patient contacted to bring Advance Directive, POLST, or Living Will document to next scheduled pcp visit.[facility] Department left message.    Thank you.  Zee Miller MA  [facility]

## 2024-09-13 ENCOUNTER — OFFICE VISIT (OUTPATIENT)
Dept: FAMILY MEDICINE CLINIC | Facility: CLINIC | Age: 74
End: 2024-09-13
Payer: MEDICARE

## 2024-09-13 VITALS
HEART RATE: 74 BPM | HEIGHT: 63 IN | WEIGHT: 161 LBS | BODY MASS INDEX: 28.53 KG/M2 | SYSTOLIC BLOOD PRESSURE: 122 MMHG | TEMPERATURE: 98.4 F | DIASTOLIC BLOOD PRESSURE: 80 MMHG | OXYGEN SATURATION: 97 %

## 2024-09-13 DIAGNOSIS — Z23 ENCOUNTER FOR IMMUNIZATION: ICD-10-CM

## 2024-09-13 DIAGNOSIS — I83.11 VARICOSE VEINS OF BOTH LOWER EXTREMITIES WITH INFLAMMATION: Primary | ICD-10-CM

## 2024-09-13 DIAGNOSIS — I83.12 VARICOSE VEINS OF BOTH LOWER EXTREMITIES WITH INFLAMMATION: Primary | ICD-10-CM

## 2024-09-13 PROCEDURE — 90662 IIV NO PRSV INCREASED AG IM: CPT | Performed by: NURSE PRACTITIONER

## 2024-09-13 PROCEDURE — G0008 ADMIN INFLUENZA VIRUS VAC: HCPCS | Performed by: NURSE PRACTITIONER

## 2024-09-13 PROCEDURE — 99213 OFFICE O/P EST LOW 20 MIN: CPT | Performed by: NURSE PRACTITIONER

## 2024-09-13 NOTE — PROGRESS NOTES
Ambulatory Visit  Name: Jessica Ricci      : 1950      MRN: [id number]  Encounter Provider: EDWARD Buckner  Encounter Date: 2024   Encounter department: [facility]    Assessment & Plan  Varicose veins of both lower extremities with inflammation  Follow up with vascular specialist.   Continue with compression stockings.   Continue to elevate legs when you're able to.   Reviewed red flag symptoms and when to call for any changes such as redness/heat/swelling/pains/weight gain/respiratory symptoms.   Please call the office if you are experiencing any worsening of symptoms or no symptom improvement.   Orders:    Ambulatory Referral to Vascular Surgery; Future    Encounter for immunization    Orders:    influenza vaccine, high-dose, PF 0.5 mL (Fluzone High Dose)       History of Present Illness        patient presents the office today for discussion of regards to very close veins.  She states her's have been worse with her legs feeling heavy.  Patient did have vascular reflux study done bilaterally , bilaterally there was no evidence of deep venous incompetence.  There were findings of small saphenous vein incompetence and great saphenous vein incompetence.  Patient was referred to vascular surgery she saw them in .  They did discuss use of sclerotherapy for treatment but she did not want to pursue it at that time.  Advise she could follow-up with them on an as needed basis.  She states varicose veins worse over the past year more so since the start of . Has enlarged vein behind left knee. Started using compression stockings and this helps. This has been over the past 2 weeks. This has helped with legs feeling tired/heavy. No pain with walking. No chest pain. Has some shortness of breath at times but these are related to asthma diagnosis without any changes.  Denies any abnormal weight gain or fluid retention that she has noticed.        Review of Systems  "  Constitutional:  Negative for chills and fever.   Eyes:  Negative for discharge.   Respiratory:  Negative for shortness of breath.    Cardiovascular:  Negative for chest pain.   Gastrointestinal:  Negative for constipation and diarrhea.   Genitourinary:  Negative for difficulty urinating.   Musculoskeletal:  Negative for joint swelling.   Skin:  Negative for rash.   Neurological:  Negative for headaches.   Hematological:  Negative for adenopathy.   Psychiatric/Behavioral:  The patient is not nervous/anxious.            Objective     /80   Pulse 74   Temp 98.4 °F (36.9 °C)   Ht 5' 2.5\" (1.588 m)   Wt 73 kg (161 lb)   LMP  (LMP Unknown)   SpO2 97%   BMI 28.98 kg/m²     Physical Exam  Vitals and nursing note reviewed.   Constitutional:       General: She is not in acute distress.     Appearance: Normal appearance. She is well-developed. She is not diaphoretic.   HENT:      Head: Normocephalic and atraumatic.      Right Ear: External ear normal.      Left Ear: External ear normal.   Eyes:      General: Lids are normal.         Right eye: No discharge.         Left eye: No discharge.      Conjunctiva/sclera: Conjunctivae normal.   Cardiovascular:      Rate and Rhythm: Normal rate and regular rhythm.      Heart sounds: No murmur heard.     Comments: Varicose veins present bilateral legs most prominent behind left knee. No edema currently. Skin warm/dry without any abnormal heat or erythema no current pain currently wearing compression stockings   Pulmonary:      Effort: Pulmonary effort is normal. No respiratory distress.      Breath sounds: Normal breath sounds. No wheezing.   Musculoskeletal:         General: No deformity.   Skin:     General: Skin is warm and dry.   Neurological:      General: No focal deficit present.      Mental Status: She is alert and oriented to person, place, and time.   Psychiatric:         Speech: Speech normal.         Behavior: Behavior normal.         Thought Content: Thought " content normal.         Judgment: Judgment normal.

## 2024-09-13 NOTE — PATIENT INSTRUCTIONS
Follow up with vascular specialist.     Continue with compression stockings.     Continue to elevate legs when you're able to.     Please call the office if you are experiencing any worsening of symptoms or no symptom improvement.

## 2025-01-09 NOTE — PROGRESS NOTES
PT Evaluation     Today's date: 1/10/2025  Patient name: Jessica Ricci  : 1950  MRN: [id number]  Referring provider: Antonino Hill*  Dx:   Encounter Diagnosis     ICD-10-CM    1. Left hip pain  M25.552       2. Trochanteric bursitis of left hip  M70.62           Start Time: 0730  Stop Time: 0815  Total time in clinic (min): 45 minutes    Assessment  Impairments: abnormal coordination, abnormal muscle firing, abnormal muscle tone, abnormal or restricted ROM, abnormal movement, activity intolerance, impaired physical strength, lacks appropriate home exercise program, pain with function, participation limitations, activity limitations and endurance  Symptom irritability: moderate    Assessment details: Jessica Ricci is a pleasant 74 y.o. female who presents with left hip secondary to suspected diagnosis  of trochanteric bursitis vs tendinitis. She currently demonstrates limitations with her NM strength and endurance which limits her participation in ADL's and disrupts her sleep. Functional impairments include difficulty with navigating steps, walking fast, squatting, and bending. Based on listed impairments, Jessica Ricci would benefit from formal physical therapy to address impairments as detailed, decrease pain, and restore maximal level of function for all home, work, and mobility tasks. All patient questions and concerns have been addressed at this time. Thank you for this referral.  Understanding of Dx/Px/POC: good     Prognosis: good    Goals  Short Term Goals:   1. Patient will be independent with initial customized HEP in 1 week  2. Patient will report at least 40% improvement overall with sleeping at night without waking up in 2-4 weeks.  3. Patient will decrease pain with activity by 2 points on VAS with in 2-4 weeks.  4. Patient will demonstrate improved ability to stand up from a chair 10x's without any deviations and pain in 2-4 weeks  5. Patient will report improved ability to walk  her dog daily for 1.5 miles without her hip bothering her in 2-4 weeks    Long Term Goals:   1. Patient will improve FOTO to greater than goal of 73 in 6-8 weeks  2. Patient will eliminiate pain with activity in 6-8 weeks  3. Patient will continue with HEP independence to allow for decreased future reoccurrence of pain and loss in function in 6-8 weeks  4. Patient will report at least 80% improvement overall with her ability to perform SLS for > 10sec in 6-8 weeks.   5. Patient will demonstrate improved MMT scores of her LE BL to > 4+/5 in all directions by discharge.    Plan  Patient would benefit from: skilled physical therapy and PT eval  Planned modality interventions: low level laser therapy, cryotherapy, electrical stimulation/Russian stimulation, TENS and thermotherapy: hydrocollator packs    Planned therapy interventions: IASTM, joint mobilization, kinesiology taping, massage, manual therapy, Enriquez taping, balance/weight bearing training, body mechanics training, neuromuscular re-education, nerve gliding, coordination, strengthening, stretching, therapeutic activities, therapeutic exercise, home exercise program, graded exercise and graded motor    Frequency: 1-2x week  Duration in weeks: 6  Plan of Care beginning date: 1/10/2025  Plan of Care expiration date: 2/21/2025  Treatment plan discussed with: patient  Plan details: Prognosis above is given PT services 2x/week tapering to 1x/week over the next 6-8 weeks and home program adherence.      Subjective Evaluation    History of Present Illness  Mechanism of injury: Patient is 74 y.o. female that presents to PT with CC of left hip pain that began back in September 2024. Has been doing stretching for the past few months and helps sometimes. Trains with her dog who weighs 75lbs and will beginning an agility training program in the coming weeks. She reports that she has the most pain at night and gives her difficulty with sleeping. Patient reports the pain  is reproduced with repetitive activity with stair navigation and getting up and down the step. Patient's goals from PT include eliminate pain and be able to train with her dog.                 Recurrent probem    Quality of life: good    Patient Goals  Patient goals for therapy: decreased pain, improved balance, increased motion, increased strength, independence with ADLs/IADLs and return to sport/leisure activities  Patient goal: Training and walking her dog  Pain  Current pain ratin  At best pain ratin  At worst pain ratin  Location: Left lateral hip, greater trochanter  Quality: dull ache, discomfort and pressure  Relieving factors: medications and rest  Aggravating factors: stair climbing  Progression: no change    Social Support  Steps to enter house: yes  Stairs in house: yes   Lives in: multiple-level home    Employment status: working  Hand dominance: right  Exercise history: Training with her dog, walking 1 mile a day  Life stress: Dog care        Objective     Tenderness     Left Hip   Tenderness in the greater trochanter.     Right Hip   Tenderness in the greater trochanter.     Neurological Testing     Sensation     Hip   Left Hip   Intact: light touch    Right Hip   Intact: light touch    Reflexes   Left   Patellar (L4): normal (2+)  Achilles (S1): normal (2+)    Right   Patellar (L4): normal (2+)  Achilles (S1): normal (2+)    Active Range of Motion   Left Hip   Normal active range of motion    Right Hip   Normal active range of motion    Passive Range of Motion   Left Hip   Flexion: WFL  Abduction: WFL  External rotation (90/90): 45 degrees with pain  Internal rotation (90/90): 35 degrees with pain    Right Hip   Flexion: WFL  Abduction: WFL  External rotation (90/90): 75 degrees   Internal rotation (90/90): 45 degrees     Strength/Myotome Testing     Left Hip   Planes of Motion   Flexion: 4-  Extension: 4-  Abduction: 3+  External rotation: 4  Internal rotation: 4    Right Hip   Planes of  "Motion   Flexion: 4  Extension: 4  Abduction: 4  External rotation: 4+  Internal rotation: 4+    Left Knee   Flexion: 5  Extension: 5    Right Knee   Flexion: 5  Extension: 5    Tests     Left Hip   Positive SEAMUS and piriformis.   Negative FADIR and scour.     Right Hip   Negative SEAMUS, FADIR, piriformis and scour.     Functional Assessment      Squat    Left valgus and right valgus.     Single Leg Stance - Eyes Open   Left  Trial 1: 5 seconds  Trial 2: 7 seconds  Trial 3: 4 seconds  Average: 5.33 seconds     Right  Trial 1: 8 seconds  Trial 2: 6 seconds  Trial 3: 6 seconds  Average: 6.67 seconds              Precautions: Arthritis, Asthma, Back pain, Osteoporosis, prior sx  Access Code: [email or web address]   Manuals IE 1/10                     Lumbar mobs                      STM                       Hamstring stretch                       Hip PROM HB                                    Neuro Re-Ed                       TA activation                      TA bridge                       Bridge 3x10    BTB                     SL Clam 3x10     BTB                     Mod curl up                       Bird dogs                                               Ther Ex                       Bike                       Lat side stepping                       SL Abd 3x10                     Prone press up                       Hamstring stretch 3x30\"                      ITB Stretch 3x30\"                                                                      Ther Activity                                                                       Gait Training                                                                       Modalities                                                                             "

## 2025-01-10 ENCOUNTER — EVALUATION (OUTPATIENT)
Dept: PHYSICAL THERAPY | Facility: CLINIC | Age: 75
End: 2025-01-10
Payer: MEDICARE

## 2025-01-10 DIAGNOSIS — M70.62 TROCHANTERIC BURSITIS OF LEFT HIP: ICD-10-CM

## 2025-01-10 DIAGNOSIS — M25.552 LEFT HIP PAIN: Primary | ICD-10-CM

## 2025-01-10 PROCEDURE — 97161 PT EVAL LOW COMPLEX 20 MIN: CPT

## 2025-01-10 PROCEDURE — 97110 THERAPEUTIC EXERCISES: CPT

## 2025-01-10 NOTE — LETTER
January 10, 2025    Antonino Hill MD  [address] [place] [id number]    Patient: Jessica Ricci   YOB: 1950   Date of Visit: 1/10/2025     Encounter Diagnosis     ICD-10-CM    1. Left hip pain  M25.552       2. Trochanteric bursitis of left hip  M70.62           Dear Dr. Hill:    Thank you for your recent referral of Jessica Ricci. Please review the attached evaluation summary from Jessica's recent visit.     Please verify that you agree with the plan of care by signing the attached order.     If you have any questions or concerns, please do not hesitate to call.     I sincerely appreciate the opportunity to share in the care of one of your patients and hope to have another opportunity to work with you in the near future.       Sincerely,    Stanley Onofre, PT      Referring Provider:      I certify that I have read the below Plan of Care and certify the need for these services furnished under this plan of treatment while under my care.                    Antonino Hill MD  [address] [place] [address]  Via Fax: [phone]          PT Evaluation     Today's date: 1/10/2025  Patient name: Jessica Ricci  : 1950  MRN: [id number]  Referring provider: Antonino Hill*  Dx:   Encounter Diagnosis     ICD-10-CM    1. Left hip pain  M25.552       2. Trochanteric bursitis of left hip  M70.62           Start Time: 0730  Stop Time: 0815  Total time in clinic (min): 45 minutes    Assessment  Impairments: abnormal coordination, abnormal muscle firing, abnormal muscle tone, abnormal or restricted ROM, abnormal movement, activity intolerance, impaired physical strength, lacks appropriate home exercise program, pain with function, participation limitations, activity limitations and endurance  Symptom irritability: moderate    Assessment details: Jessica Ricci is a pleasant 74 y.o. female who presents with left hip secondary to suspected diagnosis  of  trochanteric bursitis vs tendinitis. She currently demonstrates limitations with her NM strength and endurance which limits her participation in ADL's and disrupts her sleep. Functional impairments include difficulty with navigating steps, walking fast, squatting, and bending. Based on listed impairments, Jessica Ricci would benefit from formal physical therapy to address impairments as detailed, decrease pain, and restore maximal level of function for all home, work, and mobility tasks. All patient questions and concerns have been addressed at this time. Thank you for this referral.  Understanding of Dx/Px/POC: good     Prognosis: good    Goals  Short Term Goals:   1. Patient will be independent with initial customized HEP in 1 week  2. Patient will report at least 40% improvement overall with sleeping at night without waking up in 2-4 weeks.  3. Patient will decrease pain with activity by 2 points on VAS with in 2-4 weeks.  4. Patient will demonstrate improved ability to stand up from a chair 10x's without any deviations and pain in 2-4 weeks  5. Patient will report improved ability to walk her dog daily for 1.5 miles without her hip bothering her in 2-4 weeks    Long Term Goals:   1. Patient will improve FOTO to greater than goal of 73 in 6-8 weeks  2. Patient will eliminiate pain with activity in 6-8 weeks  3. Patient will continue with HEP independence to allow for decreased future reoccurrence of pain and loss in function in 6-8 weeks  4. Patient will report at least 80% improvement overall with her ability to perform SLS for > 10sec in 6-8 weeks.   5. Patient will demonstrate improved MMT scores of her LE BL to > 4+/5 in all directions by discharge.    Plan  Patient would benefit from: skilled physical therapy and PT eval  Planned modality interventions: low level laser therapy, cryotherapy, electrical stimulation/Russian stimulation, TENS and thermotherapy: hydrocollator packs    Planned therapy  interventions: IASTM, joint mobilization, kinesiology taping, massage, manual therapy, Enriquez taping, balance/weight bearing training, body mechanics training, neuromuscular re-education, nerve gliding, coordination, strengthening, stretching, therapeutic activities, therapeutic exercise, home exercise program, graded exercise and graded motor    Frequency: 1-2x week  Duration in weeks: 6  Plan of Care beginning date: 1/10/2025  Plan of Care expiration date: 2025  Treatment plan discussed with: patient  Plan details: Prognosis above is given PT services 2x/week tapering to 1x/week over the next 6-8 weeks and home program adherence.      Subjective Evaluation    History of Present Illness  Mechanism of injury: Patient is 74 y.o. female that presents to PT with CC of left hip pain that began back in 2024. Has been doing stretching for the past few months and helps sometimes. Trains with her dog who weighs 75lbs and will beginning an agility training program in the coming weeks. She reports that she has the most pain at night and gives her difficulty with sleeping. Patient reports the pain is reproduced with repetitive activity with stair navigation and getting up and down the step. Patient's goals from PT include eliminate pain and be able to train with her dog.                 Recurrent probem    Quality of life: good    Patient Goals  Patient goals for therapy: decreased pain, improved balance, increased motion, increased strength, independence with ADLs/IADLs and return to sport/leisure activities  Patient goal: Training and walking her dog  Pain  Current pain ratin  At best pain ratin  At worst pain ratin  Location: Left lateral hip, greater trochanter  Quality: dull ache, discomfort and pressure  Relieving factors: medications and rest  Aggravating factors: stair climbing  Progression: no change    Social Support  Steps to enter house: yes  Stairs in house: yes   Lives in:  multiple-level home    Employment status: working  Hand dominance: right  Exercise history: Training with her dog, walking 1 mile a day  Life stress: Dog care        Objective     Tenderness     Left Hip   Tenderness in the greater trochanter.     Right Hip   Tenderness in the greater trochanter.     Neurological Testing     Sensation     Hip   Left Hip   Intact: light touch    Right Hip   Intact: light touch    Reflexes   Left   Patellar (L4): normal (2+)  Achilles (S1): normal (2+)    Right   Patellar (L4): normal (2+)  Achilles (S1): normal (2+)    Active Range of Motion   Left Hip   Normal active range of motion    Right Hip   Normal active range of motion    Passive Range of Motion   Left Hip   Flexion: WFL  Abduction: WFL  External rotation (90/90): 45 degrees with pain  Internal rotation (90/90): 35 degrees with pain    Right Hip   Flexion: WFL  Abduction: WFL  External rotation (90/90): 75 degrees   Internal rotation (90/90): 45 degrees     Strength/Myotome Testing     Left Hip   Planes of Motion   Flexion: 4-  Extension: 4-  Abduction: 3+  External rotation: 4  Internal rotation: 4    Right Hip   Planes of Motion   Flexion: 4  Extension: 4  Abduction: 4  External rotation: 4+  Internal rotation: 4+    Left Knee   Flexion: 5  Extension: 5    Right Knee   Flexion: 5  Extension: 5    Tests     Left Hip   Positive SEAMUS and piriformis.   Negative FADIR and scour.     Right Hip   Negative SEAMUS, FADIR, piriformis and scour.     Functional Assessment      Squat    Left valgus and right valgus.     Single Leg Stance - Eyes Open   Left  Trial 1: 5 seconds  Trial 2: 7 seconds  Trial 3: 4 seconds  Average: 5.33 seconds     Right  Trial 1: 8 seconds  Trial 2: 6 seconds  Trial 3: 6 seconds  Average: 6.67 seconds              Precautions: Arthritis, Asthma, Back pain, Osteoporosis, prior sx  Access Code: [email or web address]   Manuals IE 1/10                     Lumbar mobs                      STM                       Hamstring  "stretch                       Hip PROM HB                                    Neuro Re-Ed                       TA activation                      TA bridge                       Bridge 3x10    BTB                     SL Clam 3x10     BTB                     Mod curl up                       Bird dogs                                               Ther Ex                       Bike                       Lat side stepping                       SL Abd 3x10                     Prone press up                       Hamstring stretch 3x30\"                      ITB Stretch 3x30\"                                                                      Ther Activity                                                                       Gait Training                                                                       Modalities                                                                                             "

## 2025-01-14 ENCOUNTER — OFFICE VISIT (OUTPATIENT)
Dept: PHYSICAL THERAPY | Facility: CLINIC | Age: 75
End: 2025-01-14
Payer: MEDICARE

## 2025-01-14 DIAGNOSIS — M25.552 LEFT HIP PAIN: Primary | ICD-10-CM

## 2025-01-14 DIAGNOSIS — M70.62 TROCHANTERIC BURSITIS OF LEFT HIP: ICD-10-CM

## 2025-01-14 PROCEDURE — 97110 THERAPEUTIC EXERCISES: CPT

## 2025-01-14 PROCEDURE — 97112 NEUROMUSCULAR REEDUCATION: CPT

## 2025-01-14 PROCEDURE — 97140 MANUAL THERAPY 1/> REGIONS: CPT

## 2025-01-17 ENCOUNTER — OFFICE VISIT (OUTPATIENT)
Dept: PHYSICAL THERAPY | Facility: CLINIC | Age: 75
End: 2025-01-17
Payer: MEDICARE

## 2025-01-17 DIAGNOSIS — M25.552 LEFT HIP PAIN: Primary | ICD-10-CM

## 2025-01-17 DIAGNOSIS — M70.62 TROCHANTERIC BURSITIS OF LEFT HIP: ICD-10-CM

## 2025-01-17 PROCEDURE — 97140 MANUAL THERAPY 1/> REGIONS: CPT

## 2025-01-17 PROCEDURE — 97110 THERAPEUTIC EXERCISES: CPT

## 2025-01-17 NOTE — PROGRESS NOTES
"Daily Note     Today's date: 2025  Patient name: Jessica Ricci  : 1950  MRN: [id number]  Referring provider: Antonino Hill*  Dx:   Encounter Diagnosis     ICD-10-CM    1. Left hip pain  M25.552       2. Trochanteric bursitis of left hip  M70.62             Start Time: 0725  Stop Time: 0810  Total time in clinic (min): 45 minutes    Subjective: Patient reports that she began to notice some improvement in her hip pain and yesterday was the first time she did not have to take tylenol prior to bed.       Objective: See treatment diary below      Assessment: Tolerated treatment well. Progressing patient today with LE NM endurance and strength exercises. Patient with very minimal complaints of hip pain with activity, although does fatigue by end of her sets and requires rests breaks. Continue to provide education on sets and reps and making sure she is working on eccentric control and taking he time. Patient demonstrated fatigue post treatment, exhibited good technique with therapeutic exercises, and would benefit from continued PT      Plan: Continue per plan of care.      Precautions: Arthritis, Asthma, Back pain, Osteoporosis, prior sx  Access Code: [email or web address]   Manuals IE 1/10  1/14  1/17                 Lumbar mobs                      STM                       Hamstring stretch   33x30\" 3x30\"                 ITB Stretch  3x30\" 3x30\"          Piriformis stretch  3x30\" 3x30\"          Hip PROM HB HB HB                                  Neuro Re-Ed                       Tandem stance   3x20\" ea    FOAM  2x30\"  ea    FOAM                 Fitter board      x60sec    ML/AP                 Bridge 3x10    BTB  3x10    BTB  3x10    PTB                 SL Clam 3x10     BTB  2x10    BTB  HEP                 Lateral step down      2x8 ea    4\" step                 Bird dogs                                               Ther Ex                       Bike   5' Lv 1  5' Lv 1                 Leg press  " "3x10    #95 3x10    #95          Lat side stepping   3x10ft    PTB  3x10ft    PTB                 SL Abd 3x10 3x10  3x10    #1                 Prone press up                       Hamstring stretch 3x30\"  3x30\"  3x30\"                  ITB Stretch 3x30\"   3x30\"  3x30\"                  STS    3x10     YMB  3x10    YMB                                         Ther Activity                                                                       Gait Training                                                                       Modalities                                                                             " 242.5

## 2025-01-20 ENCOUNTER — OFFICE VISIT (OUTPATIENT)
Dept: PHYSICAL THERAPY | Facility: CLINIC | Age: 75
End: 2025-01-20
Payer: MEDICARE

## 2025-01-20 DIAGNOSIS — M70.62 TROCHANTERIC BURSITIS OF LEFT HIP: ICD-10-CM

## 2025-01-20 DIAGNOSIS — M25.552 LEFT HIP PAIN: Primary | ICD-10-CM

## 2025-01-20 PROCEDURE — 97112 NEUROMUSCULAR REEDUCATION: CPT

## 2025-01-20 PROCEDURE — 97140 MANUAL THERAPY 1/> REGIONS: CPT

## 2025-01-20 PROCEDURE — 97110 THERAPEUTIC EXERCISES: CPT

## 2025-01-20 NOTE — PROGRESS NOTES
"Daily Note     Today's date: 2025  Patient name: Jessica Ricci  : 1950  MRN: [id number]  Referring provider: Antonino Hill*  Dx:   Encounter Diagnosis     ICD-10-CM    1. Left hip pain  M25.552       2. Trochanteric bursitis of left hip  M70.62           Start Time: 1350  Stop Time: 1443  Total time in clinic (min): 53 minutes    Subjective: Patient reports that she has been sleeping a lot better lately. Reports that she does get sore on days she has therapy but has been feeling great on the other days.     Objective: See treatment diary below    Assessment: Tolerated treatment well. Progressing patient today with LE NM endurance and strength exercises. Patient with very minimal complaints of hip pain with activity with improvement in fatigue and rest breaks needed. Noting fatigue with NM balance activities and required corrections to catch her balance multiple times today.  Patient demonstrated fatigue post treatment, exhibited good technique with therapeutic exercises, and would benefit from continued PT      Plan: Continue per plan of care.      Precautions: Arthritis, Asthma, Back pain, Osteoporosis, prior sx  Access Code: [email or web address]   Manuals IE 1/10  1/14  1/17  1/20               Lumbar mobs                      STM                       Hamstring stretch   33x30\" 3x30\"  3x30\"               ITB Stretch  3x30\" 3x30\" 3x30\"         Piriformis stretch  3x30\" 3x30\" 3x30\"         Hip PROM HB HB HB HB                                 Neuro Re-Ed                       Tandem stance   3x20\" ea    FOAM  2x30\"  ea    FOAM  SLS  EC    2x20\" ea               Fitter board      x60sec    ML/AP  x60sec    ML/AP               Bridge 3x10    BTB  3x10    BTB  3x10    PTB  3x10    PTB               SL Clam 3x10     BTB  2x10    BTB  HEP                 Lateral step down      2x8 ea    4\" step  2x8 ea    4\" step               Bird dogs                                               Ther Ex                     " "  Bike   5' Lv 1  5' Lv 1  5' Lv 1               Leg press  3x10    #95 3x10    #95 3x10    #105         Sweta    5x ea    Lateral #20         Lat side stepping   3x10ft    PTB  3x10ft    PTB  HEP               SL Abd 3x10 3x10  3x10    #1  3x10    #1               Prone press up                       Hamstring stretch 3x30\"  3x30\"  3x30\"  HEP                ITB Stretch 3x30\"   3x30\"  3x30\"  HEP                STS    3x10     YMB  3x10    YMB  3x10    YMB                                       Ther Activity                                                                       Gait Training                                                                       Modalities                                                                             "

## 2025-01-23 NOTE — PROGRESS NOTES
"Daily Note     Today's date: 2025  Patient name: Jessica Ricci  : 1950  MRN: [id number]  Referring provider: Antonino Hill*  Dx:   Encounter Diagnosis     ICD-10-CM    1. Left hip pain  M25.552       2. Trochanteric bursitis of left hip  M70.62           Start Time: 0815  Stop Time: 0900  Total time in clinic (min): 45 minutes    Subjective: Patient reports that she is very sore from her last therapy session. Reports that she may have done too much and her hip was sore and had trouble sleeping.     Objective: See treatment diary below    Assessment: Tolerated treatment fair. Regressed progressions today as patient reported that she felt she did too much last time and would like to try some of her old exercises and see how she responds over the weekend. Complaints of mild soreness over her lateral hip with SL abd and STS. Noting fatigue with NM balance activities and required corrections to catch her balance multiple times today.  Patient demonstrated fatigue post treatment, exhibited good technique with therapeutic exercises, and would benefit from continued PT      Plan: Continue per plan of care.      Precautions: Arthritis, Asthma, Back pain, Osteoporosis, prior sx  Access Code: [email or web address]   Manuals IE 1/10  1/14  1/17  1/20  1/23             Lumbar mobs                      STM                       Hamstring stretch   33x30\" 3x30\"  3x30\"  3x30\"             ITB Stretch  3x30\" 3x30\" 3x30\" 3x30\"        Piriformis stretch  3x30\" 3x30\" 3x30\" 3x30\"        Hip PROM HB HB HB HB HB                                Neuro Re-Ed                       Tandem stance   3x20\" ea    FOAM  2x30\"  ea    FOAM  SLS  EC    2x20\" ea  held             Fitter board      x60sec    ML/AP  x60sec    ML/AP  x60sec    ML/AP             Bridge 3x10    BTB  3x10    BTB  3x10    PTB  3x10    PTB  3x10    PTB             SL Clam 3x10     BTB  2x10    BTB  HEP   2x10     PTB             Lateral step down      2x8 ea    4\" " "step  2x8 ea    4\" step  hold             Bird dogs                                               Ther Ex                       Bike   5' Lv 1  5' Lv 1  5' Lv 1  5' Lv 1             Leg press  3x10    #95 3x10    #95 3x10    #105 3x10    #95        Sweta    5x ea    Lateral #20 held        Lat side stepping   3x10ft    PTB  3x10ft    PTB  HEP               SL Abd 3x10 3x10  3x10    #1  3x10    #1  3x10             Prone press up                       Hamstring stretch 3x30\"  3x30\"  3x30\"  HEP                ITB Stretch 3x30\"   3x30\"  3x30\"  HEP                STS    3x10     YMB  3x10    YMB  3x10    YMB  x10                                         Ther Activity                                                                       Gait Training                                                                       Modalities                                                                             "

## 2025-01-24 ENCOUNTER — OFFICE VISIT (OUTPATIENT)
Dept: PHYSICAL THERAPY | Facility: CLINIC | Age: 75
End: 2025-01-24
Payer: MEDICARE

## 2025-01-24 DIAGNOSIS — M25.552 LEFT HIP PAIN: Primary | ICD-10-CM

## 2025-01-24 DIAGNOSIS — M70.62 TROCHANTERIC BURSITIS OF LEFT HIP: ICD-10-CM

## 2025-01-24 PROCEDURE — 97110 THERAPEUTIC EXERCISES: CPT

## 2025-01-24 PROCEDURE — 97140 MANUAL THERAPY 1/> REGIONS: CPT

## 2025-01-28 ENCOUNTER — OFFICE VISIT (OUTPATIENT)
Dept: PHYSICAL THERAPY | Facility: CLINIC | Age: 75
End: 2025-01-28
Payer: MEDICARE

## 2025-01-28 DIAGNOSIS — M25.552 LEFT HIP PAIN: Primary | ICD-10-CM

## 2025-01-28 DIAGNOSIS — M70.62 TROCHANTERIC BURSITIS OF LEFT HIP: ICD-10-CM

## 2025-01-28 PROCEDURE — 97110 THERAPEUTIC EXERCISES: CPT

## 2025-01-28 PROCEDURE — 97140 MANUAL THERAPY 1/> REGIONS: CPT

## 2025-01-28 NOTE — PROGRESS NOTES
"Daily Note     Today's date: 2025  Patient name: Jessica Ricci  : 1950  MRN: [id number]  Referring provider: Antonino Hill*  Dx:   Encounter Diagnosis     ICD-10-CM    1. Left hip pain  M25.552       2. Trochanteric bursitis of left hip  M70.62           Start Time: 0815  Stop Time: 0900  Total time in clinic (min): 45 minutes      Subjective: Patient reports doing better since LV. Reports that last night she did not sleep well but she felt good over the weekend.       Objective: See treatment diary below      Assessment: Tolerated treatment well. Tolerated planned interventions today. Continue to progress as tolerated. Continue to progress functional status to patient's goals and ability to participate in agility training sessions with her dog.  Patient demonstrated fatigue post treatment, exhibited good technique with therapeutic exercises, and would benefit from continued PT      Plan: Continue per plan of care.      Precautions: Arthritis, Asthma, Back pain, Osteoporosis, prior sx  Access Code: [email or web address]   Manuals IE 1/10  1/14  1/17  1/20  1/23  1/28           Lumbar mobs                      STM                       Hamstring stretch   33x30\" 3x30\"  3x30\"  3x30\"  3x30\"           ITB Stretch  3x30\" 3x30\" 3x30\" 3x30\" 3x30       Piriformis stretch  3x30\" 3x30\" 3x30\" 3x30\" 3x30       Hip PROM HB HB HB HB HB HB                               Neuro Re-Ed                       Tandem stance   3x20\" ea    FOAM  2x30\"  ea    FOAM  SLS  EC    2x20\" ea  held  nv           Fitter board      x60sec    ML/AP  x60sec    ML/AP  x60sec    ML/AP  x60sec    ML/AP           Bridge 3x10    BTB  3x10    BTB  3x10    PTB  3x10    PTB  3x10    PTB  3x10    PTB           SL Clam 3x10     BTB  2x10    BTB  HEP   2x10     PTB  3x10    PTB           Lateral step down      2x8 ea    4\" step  2x8 ea    4\" step  hold             Bird dogs                                               Ther Ex                       Bike " "  5' Lv 1  5' Lv 1  5' Lv 1  5' Lv 1  5' Lv 1           Leg press  3x10    #95 3x10    #95 3x10    #105 3x10    #95 3x10    #105       Sweta    5x ea    Lateral #20 held 5x ea    Lateral #20       Lat side stepping   3x10ft    PTB  3x10ft    PTB  HEP               SL Abd 3x10 3x10  3x10    #1  3x10    #1  3x10  3x10           Prone press up                       Hamstring stretch 3x30\"  3x30\"  3x30\"  HEP                ITB Stretch 3x30\"   3x30\"  3x30\"  HEP                STS    3x10     YMB  3x10    YMB  3x10    YMB  x10      nv                                   Ther Activity                                                                       Gait Training                                                                       Modalities                                                                             "

## 2025-01-31 ENCOUNTER — OFFICE VISIT (OUTPATIENT)
Dept: PHYSICAL THERAPY | Facility: CLINIC | Age: 75
End: 2025-01-31
Payer: MEDICARE

## 2025-01-31 DIAGNOSIS — M70.62 TROCHANTERIC BURSITIS OF LEFT HIP: ICD-10-CM

## 2025-01-31 DIAGNOSIS — M25.552 LEFT HIP PAIN: Primary | ICD-10-CM

## 2025-01-31 PROCEDURE — 97140 MANUAL THERAPY 1/> REGIONS: CPT

## 2025-01-31 PROCEDURE — 97110 THERAPEUTIC EXERCISES: CPT

## 2025-01-31 PROCEDURE — 97112 NEUROMUSCULAR REEDUCATION: CPT

## 2025-01-31 NOTE — PROGRESS NOTES
"Daily Note     Today's date: 2025  Patient name: Jessica Ricci  : 1950  MRN: [id number]  Referring provider: Antonino Hill*  Dx:   Encounter Diagnosis     ICD-10-CM    1. Left hip pain  M25.552       2. Trochanteric bursitis of left hip  M70.62                        Subjective: Patient reported having increased discomfort in LB following last session which she attributed to the bridges as this onset during completion. Had agility with her dog last night and had some difficulty with handling her and getting down into tunnel.      Objective: See treatment diary below.      Assessment: Manual focused on improving lumbopelvic mobility to help with improving ROM. Held on bridges given discomfort that followed last session. Admitted to increased soreness towards the end of her session in L hip. Seeing some improvement with conservative approach to care. Will continue to benefit from skilled PT to aid in pain reduction and improving tolerance to daily functions and tasks.      Plan: Continue per plan of care.          Precautions: Arthritis, Asthma, Back pain, Osteoporosis, prior sx  Access Code: [email or web address]     Manuals IE 1/10  1/14  1/17  1/20  1/23  1/28  1/31         Lumbar mobs                      STM                       Hamstring stretch   33x30\" 3x30\"  3x30\"  3x30\"  3x30\"  EH         ITB Stretch  3x30\" 3x30\" 3x30\" 3x30\" 3x30  EH      Piriformis stretch  3x30\" 3x30\" 3x30\" 3x30\" 3x30  EH      Hip PROM HB HB HB HB HB HB  EH                              Neuro Re-Ed                       Tandem stance   3x20\" ea    FOAM  2x30\"  ea    FOAM  SLS  EC    2x20\" ea  held  nv  NV         Fitter board      x60sec    ML/AP  x60sec    ML/AP  x60sec    ML/AP  x60sec    ML/AP  1 min ea         Bridge 3x10    BTB  3x10    BTB  3x10    PTB  3x10    PTB  3x10    PTB  3x10    PTB  Supine hip abd     Purple   3x10         SL Clam 3x10     BTB  2x10    BTB  HEP   2x10     PTB  3x10    PTB  Pink   3x10       " "  Lateral step down      2x8 ea    4\" step  2x8 ea    4\" step  hold             Bird dogs                                               Ther Ex                       Bike   5' Lv 1  5' Lv 1  5' Lv 1  5' Lv 1  5' Lv 1  5 min   L1         Leg press  3x10    #95 3x10    #95 3x10    #105 3x10    #95 3x10    #105  105#   3x10      [place]    5x ea    Lateral #20 held 5x ea    Lateral #20 Lateral  20.0    x5 ea      Lat side stepping   3x10ft    PTB  3x10ft    PTB  HEP               SL Abd 3x10 3x10  3x10    #1  3x10    #1  3x10  3x10  3x10         Prone press up                      Hamstring stretch 3x30\"  3x30\"  3x30\"  HEP               ITB Stretch 3x30\"   3x30\"  3x30\"  HEP               STS    3x10     YMB  3x10    YMB  3x10    YMB  x10      nv  x10                                 Ther Activity                                                                       Gait Training                                                                       Modalities                                                                          "

## 2025-02-03 ENCOUNTER — OFFICE VISIT (OUTPATIENT)
Dept: PHYSICAL THERAPY | Facility: CLINIC | Age: 75
End: 2025-02-03
Payer: MEDICARE

## 2025-02-03 DIAGNOSIS — M25.552 LEFT HIP PAIN: Primary | ICD-10-CM

## 2025-02-03 DIAGNOSIS — M70.62 TROCHANTERIC BURSITIS OF LEFT HIP: ICD-10-CM

## 2025-02-03 PROCEDURE — 97140 MANUAL THERAPY 1/> REGIONS: CPT

## 2025-02-03 PROCEDURE — 97110 THERAPEUTIC EXERCISES: CPT

## 2025-02-03 PROCEDURE — 97112 NEUROMUSCULAR REEDUCATION: CPT

## 2025-02-03 NOTE — PROGRESS NOTES
"Daily Note     Today's date: 2/3/2025  Patient name: Jsesica Ricci  : 1950  MRN: [id number]  Referring provider: Antonino Hill*  Dx:   Encounter Diagnosis     ICD-10-CM    1. Left hip pain  M25.552       2. Trochanteric bursitis of left hip  M70.62             Start Time: 0756  Stop Time: 0845  Total time in clinic (min): 49 minutes      Subjective: Patient reported that her hip pain has improved but she still feels mild soreness. Reporting that going up and down the steps is getting better and is able to do it without pain. Is still considering returning to see physician for follow up.      Objective: See treatment diary below.      Assessment: Patient presenting with improvement in symptom aggravation today and tolerated therapy session with modifications as needed. Provided patient with lumbar trunk rotations and figure 4 stretching as she seems to be responding well to stretching over strengthening activities. Will drop down to 1x a week for the next 2 weeks prior to returning to her physician for follow up.       Plan: Continue per plan of care.          Precautions: Arthritis, Asthma, Back pain, Osteoporosis, prior sx  Access Code: [email or web address]   Manuals IE 1/10  1/14  1/17  1/20  1/23  1/28  1/31  2/3       Lumbar mobs                      STM                       Hamstring stretch   33x30\" 3x30\"  3x30\"  3x30\"  3x30\"  EH  3x30\"       ITB Stretch  3x30\" 3x30\" 3x30\" 3x30\" 3x30  EH 3x30\"     Piriformis stretch  3x30\" 3x30\" 3x30\" 3x30\" 3x30  EH 3x30\"     Hip PROM HB HB HB HB HB HB  EH HB      MRE                2x10    ABD/ADD       Neuro Re-Ed                       Tandem stance   3x20\" ea    FOAM  2x30\"  ea    FOAM  SLS  EC    2x20\" ea  held  nv  NV         Fitter board      x60sec    ML/AP  x60sec    ML/AP  x60sec    ML/AP  x60sec    ML/AP  1 min ea  1min ea       Bridge 3x10    BTB  3x10    BTB  3x10    PTB  3x10    PTB  3x10    PTB  3x10    PTB  Supine hip abd     Purple   3x10  held       SL " "Clam 3x10     BTB  2x10    BTB  HEP   2x10     PTB  3x10    PTB  Pink   3x10  BTB   3x10       Lateral step down      2x8 ea    4\" step  2x8 ea    4\" step  hold             Bird dogs                                               Ther Ex                       Bike   5' Lv 1  5' Lv 1  5' Lv 1  5' Lv 1  5' Lv 1  5 min   L1  5min   L1       Leg press  3x10    #95 3x10    #95 3x10    #105 3x10    #95 3x10    #105  105#   3x10 105#    3x12     Marisol    5x ea    Lateral #20 held 5x ea    Lateral #20 Lateral  20.0    x5 ea Lateral  20.0    x5 ea     Lat side stepping   3x10ft    PTB  3x10ft    PTB  HEP               SL Abd 3x10 3x10  3x10    #1  3x10    #1  3x10  3x10  3x10  3x10       Prone press up                      Hamstring stretch 3x30\"  3x30\"  3x30\"  HEP        3x30\"       ITB Stretch 3x30\"   3x30\"  3x30\"  HEP        3x30\"       Piriformis stretch        3x30\"     STS    3x10     YMB  3x10    YMB  3x10    YMB  x10      nv  x10  x10                               Ther Activity                                                                       Gait Training                                                                       Modalities                                                                          "

## 2025-02-10 ENCOUNTER — OFFICE VISIT (OUTPATIENT)
Dept: PHYSICAL THERAPY | Facility: CLINIC | Age: 75
End: 2025-02-10
Payer: MEDICARE

## 2025-02-10 DIAGNOSIS — M70.62 TROCHANTERIC BURSITIS OF LEFT HIP: ICD-10-CM

## 2025-02-10 DIAGNOSIS — M25.552 LEFT HIP PAIN: Primary | ICD-10-CM

## 2025-02-10 PROCEDURE — 97140 MANUAL THERAPY 1/> REGIONS: CPT

## 2025-02-10 PROCEDURE — 97110 THERAPEUTIC EXERCISES: CPT

## 2025-02-10 NOTE — PROGRESS NOTES
"Daily Note     Today's date: 2/10/2025  Patient name: Jessica Ricci  : 1950  MRN: [id number]  Referring provider: Antonino Hill*  Dx:   Encounter Diagnosis     ICD-10-CM    1. Left hip pain  M25.552       2. Trochanteric bursitis of left hip  M70.62                      Subjective: Pt states that she is doing alright today. Indicates that she was able to the sleep the last few nights without taking any medication, but was still uncomfortable. Reports that she is doing the stairs is getting easier as well. Is scheduled to follow up with her doctor next week.       Objective: See treatment diary below      Assessment: Tolerated treatment well. Primarily focused on mobility vs strengthening activities today to limit symptom irritability. Manual techniques were performed to improve lumbopelvic mobility and ROM. Recommended to continue without mobility type HEP at this time. Patient demonstrated fatigue post treatment and would benefit from continued PT      Plan: Continue per plan of care. Will follow up with patient next week; potential Re-Evaluation next session.      Precautions: Arthritis, Asthma, Back pain, Osteoporosis, prior sx  Access Code: [email or web address]   Manuals IE 1/10  1/14  1/17  1/20  1/23  1/28  1/31  2/3  2/10     Lumbar mobs                      STM                       Hamstring stretch   33x30\" 3x30\"  3x30\"  3x30\"  3x30\"  EH  3x30\"  JM     ITB Stretch  3x30\" 3x30\" 3x30\" 3x30\" 3x30  EH 3x30\" JM    Piriformis stretch  3x30\" 3x30\" 3x30\" 3x30\" 3x30  EH 3x30\" JM    Hip PROM HB HB HB HB HB HB  EH HB JM     MRE                2x10    ABD/ADD       Neuro Re-Ed                       Tandem stance   3x20\" ea    FOAM  2x30\"  ea    FOAM  SLS  EC    2x20\" ea  held  nv  NV         Fitter board      x60sec    ML/AP  x60sec    ML/AP  x60sec    ML/AP  x60sec    ML/AP  1 min ea  1min ea       Bridge 3x10    BTB  3x10    BTB  3x10    PTB  3x10    PTB  3x10    PTB  3x10    PTB  Supine hip abd     Purple   " "3x10  held       SL Clam 3x10     BTB  2x10    BTB  HEP   2x10     PTB  3x10    PTB  Pink   3x10  BTB   3x10  3x10     Lateral step down      2x8 ea    4\" step  2x8 ea    4\" step  hold             Bird dogs                                               Ther Ex                       Bike   5' Lv 1  5' Lv 1  5' Lv 1  5' Lv 1  5' Lv 1  5 min   L1  5min   L1  5min   L1     Leg press  3x10    #95 3x10    #95 3x10    #105 3x10    #95 3x10    #105  105#   3x10 105#    3x12 105#    3x12    Marisol    5x ea    Lateral #20 held 5x ea    Lateral #20 Lateral  20.0    x5 ea Lateral  20.0    x5 ea     Lat side stepping   3x10ft    PTB  3x10ft    PTB  HEP               SL Abd 3x10 3x10  3x10    #1  3x10    #1  3x10  3x10  3x10  3x10  3x10     Prone press up                      Hamstring stretch 3x30\"  3x30\"  3x30\"  HEP        3x30\"  3x30\"     ITB Stretch 3x30\"   3x30\"  3x30\"  HEP        3x30\"       Piriformis stretch        3x30\" 3x30\"    LTRs 20x ea direction    STS    3x10     YMB  3x10    YMB  3x10    YMB  x10      nv  x10  x10                               Ther Activity                                                                       Gait Training                                                                       Modalities                                                                            "

## 2025-02-17 ENCOUNTER — OFFICE VISIT (OUTPATIENT)
Dept: PHYSICAL THERAPY | Facility: CLINIC | Age: 75
End: 2025-02-17
Payer: MEDICARE

## 2025-02-17 DIAGNOSIS — M70.62 TROCHANTERIC BURSITIS OF LEFT HIP: ICD-10-CM

## 2025-02-17 DIAGNOSIS — M25.552 LEFT HIP PAIN: Primary | ICD-10-CM

## 2025-02-17 PROCEDURE — 97140 MANUAL THERAPY 1/> REGIONS: CPT

## 2025-02-17 PROCEDURE — 97110 THERAPEUTIC EXERCISES: CPT

## 2025-02-17 PROCEDURE — 97112 NEUROMUSCULAR REEDUCATION: CPT

## 2025-02-17 NOTE — PROGRESS NOTES
Daily Note     Today's date: 2025  Patient name: Jessica Ricci  : 1950  MRN: [id number]  Referring provider: Antonino Hill*  Dx:   Encounter Diagnosis     ICD-10-CM    1. Left hip pain  M25.552       2. Trochanteric bursitis of left hip  M70.62           Start Time: 0752  Stop Time: 0840  Total time in clinic (min): 48 minutes    Subjective: Pt reports that she has overall seen improvement since beginning therapy. Reports that her pain is still present but not as intense. Reports that she would still like this to be her last day of PT and begin working on HEP at home full time. Reports that she will return to her physician tomorrow for a follow up.      Objective: See treatment diary below    Goals  Short Term Goals:   1. Patient will be independent with initial customized HEP in 1 week - MET  2. Patient will report at least 40% improvement overall with sleeping at night without waking up in 2-4 weeks. - MET  3. Patient will decrease pain with activity by 2 points on VAS with in 2-4 weeks. - MET  4. Patient will demonstrate improved ability to stand up from a chair 10x's without any deviations and pain in 2-4 weeks - MET  5. Patient will report improved ability to walk her dog daily for 1.5 miles without her hip bothering her in 2-4 weeks - MET    Long Term Goals:   1. Patient will improve FOTO to greater than goal of 73 in 6-8 weeks - MET  2. Patient will eliminiate pain with activity in 6-8 weeks - Progressing  3. Patient will continue with HEP independence to allow for decreased future reoccurrence of pain and loss in function in 6-8 weeks - Progressing  4. Patient will report at least 80% improvement overall with her ability to perform SLS for > 10sec in 6-8 weeks. - MET  5. Patient will demonstrate improved MMT scores of her LE BL to > 4+/5 in all directions by discharge. - MET    Assessment: Patient is a 74 y.o. female who has attended 10 physical therapy visits, including this one, for  "left hip trochanteric bursitis. Patient has made significant improvements in pain, mobility, strength, neuromuscular control, proprioception/balance, and overall function since beginning physical therapy and would continue to benefit from skilled care to continue addressing her deficits and continue progressing towards her goal of strength/endurance and decreasing her pain. Patient will follow up next visit after following up with orthopedics.       Plan: Progress note during next visit.      Precautions: Arthritis, Asthma, Back pain, Osteoporosis, prior sx  Access Code: [email or web address]   Manuals IE 1/10  1/14  1/17  1/20  1/23  1/28  1/31  2/3  2/10  2/17   Lumbar mobs                      STM                       Hamstring stretch   33x30\" 3x30\"  3x30\"  3x30\"  3x30\"  EH  3x30\"  JM  3x30\"   ITB Stretch  3x30\" 3x30\" 3x30\" 3x30\" 3x30  EH 3x30\" JM 3x30\"   Piriformis stretch  3x30\" 3x30\" 3x30\" 3x30\" 3x30  EH 3x30\" JM 3x30\"   Hip PROM HB HB HB HB HB HB  EH HB JM HB    MRE                2x10    ABD/ADD       Neuro Re-Ed                       Tandem stance   3x20\" ea    FOAM  2x30\"  ea    FOAM  SLS  EC    2x20\" ea  held  nv  NV      2x30\"  ea    FOAM   Fitter board      x60sec    ML/AP  x60sec    ML/AP  x60sec    ML/AP  x60sec    ML/AP  1 min ea  1min ea   x60sec    ML/AP   Bridge 3x10    BTB  3x10    BTB  3x10    PTB  3x10    PTB  3x10    PTB  3x10    PTB  Supine hip abd     Purple   3x10  held    Supine hip abd     Purple   3x10   SL Clam 3x10     BTB  2x10    BTB  HEP   2x10     PTB  3x10    PTB  Pink   3x10  BTB   3x10  3x10  3x10  BTB   Lateral step down      2x8 ea    4\" step  2x8 ea    4\" step  hold             Bird dogs                                               Ther Ex                       Bike   5' Lv 1  5' Lv 1  5' Lv 1  5' Lv 1  5' Lv 1  5 min   L1  5min   L1  5min   L1  5min L1   Leg press  3x10    #95 3x10    #95 3x10    #105 3x10    #95 3x10    #105  105#   3x10 105#    3x12 105#    3x12 115#    3x10 " "  [place]    5x ea    Lateral #20 held 5x ea    Lateral #20 Lateral  20.0    x5 ea Lateral  20.0    x5 ea  Lateral  20.0    x5 ea   Lat side stepping   3x10ft    PTB  3x10ft    PTB  HEP               SL Abd 3x10 3x10  3x10    #1  3x10    #1  3x10  3x10  3x10  3x10  3x10  3x10   Prone press up                      Hamstring stretch 3x30\"  3x30\"  3x30\"  HEP        3x30\"  3x30\"  3x30\"   ITB Stretch 3x30\"   3x30\"  3x30\"  HEP        3x30\"    3x30\"   Piriformis stretch        3x30\" 3x30\"    LTRs 20x ea direction 3x30\"    LTRs 20x ea direction   STS    3x10     YMB  3x10    YMB  3x10    YMB  x10      nv  x10  x10                               Ther Activity                                                                       Gait Training                                                                       Modalities                                                                            "

## 2025-02-26 NOTE — PROGRESS NOTES
PT Re-Evaluation     Today's date: 2025  Patient name: Jessica Ricci  : 1950  MRN: [id number]  Referring provider: Antonino Hill*  Dx:   Encounter Diagnosis     ICD-10-CM    1. Left hip pain  M25.552       2. Trochanteric bursitis of left hip  M70.62           Start Time: 0800  Stop Time: 0845  Total time in clinic (min): 45 minutes    Assessment  Impairments: abnormal coordination, abnormal muscle firing, abnormal muscle tone, abnormal or restricted ROM, abnormal movement, activity intolerance, impaired physical strength, lacks appropriate home exercise program, pain with function, participation limitations, activity limitations and endurance  Symptom irritability: moderate    Assessment details: RE : Patient is a 74 y.o. female who has attended 12 physical therapy visits, including this one, for left hip trochanteric bursitis. Patient has demonstrated improvement in their pain, mobility, strength, neuromuscular control, proprioception/balance, and overall function since beginning physical therapy. She currently still demonstrate deficits in her NM endurance and strength. She does spend a lot of time on her feet and is active with training classes for her dog. Provided education that she may not get to the point that she may wish with full abolishment of symptoms since she is very active but encourage to continue working on HEP and increasing her endurance. At this point patient will continue to benefit from skilled PT to continue addressing their deficits and making sure they will return to their PLOF. All patient questions and concerns have been addressed this visit.   Understanding of Dx/Px/POC: good     Prognosis: good    Goals  Short Term Goals:   1. Patient will be independent with initial customized HEP in 1 week - Progressing  2. Patient will report at least 40% improvement overall with sleeping at night without waking up in 2-4 weeks. - MET  3. Patient will decrease pain with  activity by 2 points on VAS with in 2-4 weeks. - Progressing  4. Patient will demonstrate improved ability to stand up from a chair 10x's without any deviations and pain in 2-4 weeks - Progressing  5. Patient will report improved ability to walk her dog daily for 1.5 miles without her hip bothering her in 2-4 weeks - Progressing    Long Term Goals:   1. Patient will improve FOTO to greater than goal of 73 in 6-8 weeks - Progressing  2. Patient will eliminiate pain with activity in 6-8 weeks - Progressing  3. Patient will continue with HEP independence to allow for decreased future reoccurrence of pain and loss in function in 6-8 weeks - Progressing  4. Patient will report at least 80% improvement overall with her ability to perform SLS for > 10sec in 6-8 weeks. - Progressing  5. Patient will demonstrate improved MMT scores of her LE BL to > 4+/5 in all directions by discharge. - Progressing     Plan  Patient would benefit from: skilled physical therapy and PT eval  Planned modality interventions: low level laser therapy, cryotherapy, electrical stimulation/Russian stimulation, TENS and thermotherapy: hydrocollator packs    Planned therapy interventions: IASTM, joint mobilization, kinesiology taping, massage, manual therapy, Enriquez taping, balance/weight bearing training, body mechanics training, neuromuscular re-education, nerve gliding, coordination, strengthening, stretching, therapeutic activities, therapeutic exercise, home exercise program, graded exercise and graded motor    Frequency: 1x week  Duration in weeks: 6  Plan of Care beginning date: 2/27/2025  Plan of Care expiration date: 4/10/2025  Treatment plan discussed with: patient  Plan details: Prognosis above is given PT services 1x/week over the next 6 weeks and home program adherence.      Subjective Evaluation    History of Present Illness  Mechanism of injury: RE 2/27: Patient reports that she had a follow up with her physician and she was able to  get a shot for her hip. She reports that this has helped a lot and that therapy has also been very beneficial. Reports that she has gotten a lot of benefit from the injection she got from her doctor. She continues to be very active with dog training classes and spending > 6hrs on her feet a day. She reports that she has getting better but is trying to make sure she doesn't injure another body part.                   Recurrent probem    Quality of life: good    Patient Goals  Patient goals for therapy: decreased pain, improved balance, increased motion, increased strength, independence with ADLs/IADLs and return to sport/leisure activities  Patient goal: Training and walking her dog  Pain  Current pain ratin  At best pain ratin  At worst pain ratin  Location: Left lateral hip, greater trochanter  Quality: discomfort  Relieving factors: rest  Aggravating factors: stair climbing  Progression: no change    Social Support  Steps to enter house: yes  Stairs in house: yes   Lives in: multiple-level home    Employment status: working  Hand dominance: right  Exercise history: Training with her dog, walking 1 mile a day  Life stress: Dog care        Objective     Tenderness     Left Hip   No tenderness in the greater trochanter.     Right Hip   No tenderness in the greater trochanter.     Neurological Testing     Sensation     Hip   Left Hip   Intact: light touch    Right Hip   Intact: light touch    Reflexes   Left   Patellar (L4): normal (2+)  Achilles (S1): normal (2+)    Right   Patellar (L4): normal (2+)  Achilles (S1): normal (2+)    Active Range of Motion   Left Hip   Normal active range of motion    Right Hip   Normal active range of motion    Passive Range of Motion   Left Hip   Flexion: WFL  Abduction: WFL  External rotation (90/90): 45 degrees with pain  Internal rotation (90/90): 35 degrees with pain    Right Hip   Flexion: WFL  Abduction: WFL  External rotation (90/90): 75 degrees   Internal rotation  "(90/90): 45 degrees     Strength/Myotome Testing     Left Hip   Planes of Motion   Flexion: 4  Extension: 4  Abduction: 4  External rotation: 4  Internal rotation: 4    Right Hip   Planes of Motion   Flexion: 4  Extension: 4  Abduction: 4  External rotation: 4+  Internal rotation: 4+    Left Knee   Flexion: 5  Extension: 5    Right Knee   Flexion: 5  Extension: 5    Tests     Left Hip   Negative SEAUMS, FADIR, piriformis and scour.     Right Hip   Negative SEAMUS, FADIR, piriformis and scour.     Functional Assessment        Single Leg Stance - Eyes Open   Left  Trial 1: 10 seconds  Trial 2: 10 seconds  Trial 3: 10 seconds  Average: 10 seconds     Right  Trial 1: 10 seconds  Trial 2: 10 seconds  Trial 3: 10 seconds  Average: 10 seconds              Precautions: Arthritis, Asthma, Back pain, Osteoporosis, prior sx  Access Code: [email or web address]   Manuals  1/23  1/28  1/31  2/3  2/10  2/17 2/27   Lumbar mobs                STM                Hamstring stretch  3x30\"  3x30\"  EH  3x30\"  JM  3x30\" HB   ITB Stretch 3x30\" 3x30  EH 3x30\" JM 3x30\" HB   Piriformis stretch 3x30\" 3x30  EH 3x30\" JM 3x30\" HB   Hip PROM HB HB  EH HB JM HB HB    MRE        2x10    ABD/ADD        Neuro Re-Ed             RE   Tandem stance  held  nv  NV      2x30\"  ea    FOAM dc   Fitter board  x60sec    ML/AP  x60sec    ML/AP  1 min ea  1min ea   x60sec    ML/AP dc   Bridge  3x10    PTB  3x10    PTB  Supine hip abd     Purple   3x10  held    Supine hip abd     Purple   3x10 HEP   SL Clam 2x10     PTB  3x10    PTB  Pink   3x10  BTB   3x10  3x10  3x10  BTB HEP   Lateral step down  hold              Bird dogs                                 Ther Ex                Bike  5' Lv 1  5' Lv 1  5 min   L1  5min   L1  5min   L1  5min L1 5min L1   Leg press 3x10    #95 3x10    #105  105#   3x10 105#    3x12 105#    3x12 115#    3x10 125#    3x8   Marisol held 5x ea    Lateral #20 Lateral  20.0    x5 ea Lateral  20.0    x5 ea  Lateral  20.0    x5 ea Lateral  20.0    x5 ea " "  Step ups             Fwd 2x10    Lat 2x10     8\"   SL Abd  3x10  3x10  3x10  3x10  3x10  3x10 HEP   Prone press up                Hamstring stretch        3x30\"  3x30\"  3x30\" HEP   ITB Stretch        3x30\"    3x30\" HEP   Piriformis stretch    3x30\" 3x30\"    LTRs 20x ea direction 3x30\"    LTRs 20x ea direction HEP   STS  x10      nv  x10  x10                         Ther Activity                                                  Gait Training                                                  Modalities                                                         "

## 2025-02-27 ENCOUNTER — EVALUATION (OUTPATIENT)
Dept: PHYSICAL THERAPY | Facility: CLINIC | Age: 75
End: 2025-02-27
Payer: MEDICARE

## 2025-02-27 DIAGNOSIS — M25.552 LEFT HIP PAIN: Primary | ICD-10-CM

## 2025-02-27 DIAGNOSIS — M70.62 TROCHANTERIC BURSITIS OF LEFT HIP: ICD-10-CM

## 2025-02-27 PROCEDURE — 97140 MANUAL THERAPY 1/> REGIONS: CPT

## 2025-02-27 PROCEDURE — 97110 THERAPEUTIC EXERCISES: CPT

## 2025-02-27 NOTE — LETTER
2025    Antonino Hill MD  [address] [place] [id number]    Patient: Jessica Ricci   YOB: 1950   Date of Visit: 2025     Encounter Diagnosis     ICD-10-CM    1. Left hip pain  M25.552       2. Trochanteric bursitis of left hip  M70.62           Dear Dr. Hill:    Thank you for your recent referral of Jessica Ricci. Please review the attached evaluation summary from Jessica's recent visit.     Please verify that you agree with the plan of care by signing the attached order.     If you have any questions or concerns, please do not hesitate to call.     I sincerely appreciate the opportunity to share in the care of one of your patients and hope to have another opportunity to work with you in the near future.       Sincerely,    Stanley Onofre, PT      Referring Provider:      I certify that I have read the below Plan of Care and certify the need for these services furnished under this plan of treatment while under my care.                    Antonino Hill MD  [address] [place] [id number]  Via Fax: [phone]          PT Re-Evaluation     Today's date: 2025  Patient name: Jessica Ricci  : 1950  MRN: [id number]  Referring provider: Antonino Hill*  Dx:   Encounter Diagnosis     ICD-10-CM    1. Left hip pain  M25.552       2. Trochanteric bursitis of left hip  M70.62           Start Time: 0800  Stop Time: 0845  Total time in clinic (min): 45 minutes    Assessment  Impairments: abnormal coordination, abnormal muscle firing, abnormal muscle tone, abnormal or restricted ROM, abnormal movement, activity intolerance, impaired physical strength, lacks appropriate home exercise program, pain with function, participation limitations, activity limitations and endurance  Symptom irritability: moderate    Assessment details: RE : Patient is a 74 y.o. female who has attended 12 physical therapy visits, including this one, for left  hip trochanteric bursitis. Patient has demonstrated improvement in their pain, mobility, strength, neuromuscular control, proprioception/balance, and overall function since beginning physical therapy. She currently still demonstrate deficits in her NM endurance and strength. She does spend a lot of time on her feet and is active with training classes for her dog. Provided education that she may not get to the point that she may wish with full abolishment of symptoms since she is very active but encourage to continue working on HEP and increasing her endurance. At this point patient will continue to benefit from skilled PT to continue addressing their deficits and making sure they will return to their PLOF. All patient questions and concerns have been addressed this visit.   Understanding of Dx/Px/POC: good     Prognosis: good    Goals  Short Term Goals:   1. Patient will be independent with initial customized HEP in 1 week - Progressing  2. Patient will report at least 40% improvement overall with sleeping at night without waking up in 2-4 weeks. - MET  3. Patient will decrease pain with activity by 2 points on VAS with in 2-4 weeks. - Progressing  4. Patient will demonstrate improved ability to stand up from a chair 10x's without any deviations and pain in 2-4 weeks - Progressing  5. Patient will report improved ability to walk her dog daily for 1.5 miles without her hip bothering her in 2-4 weeks - Progressing    Long Term Goals:   1. Patient will improve FOTO to greater than goal of 73 in 6-8 weeks - Progressing  2. Patient will eliminiate pain with activity in 6-8 weeks - Progressing  3. Patient will continue with HEP independence to allow for decreased future reoccurrence of pain and loss in function in 6-8 weeks - Progressing  4. Patient will report at least 80% improvement overall with her ability to perform SLS for > 10sec in 6-8 weeks. - Progressing  5. Patient will demonstrate improved MMT scores of her LE  BL to > 4+/5 in all directions by discharge. - Progressing     Plan  Patient would benefit from: skilled physical therapy and PT eval  Planned modality interventions: low level laser therapy, cryotherapy, electrical stimulation/Russian stimulation, TENS and thermotherapy: hydrocollator packs    Planned therapy interventions: IASTM, joint mobilization, kinesiology taping, massage, manual therapy, Enriquez taping, balance/weight bearing training, body mechanics training, neuromuscular re-education, nerve gliding, coordination, strengthening, stretching, therapeutic activities, therapeutic exercise, home exercise program, graded exercise and graded motor    Frequency: 1x week  Duration in weeks: 6  Plan of Care beginning date: 2025  Plan of Care expiration date: 4/10/2025  Treatment plan discussed with: patient  Plan details: Prognosis above is given PT services 1x/week over the next 6 weeks and home program adherence.      Subjective Evaluation    History of Present Illness  Mechanism of injury: RE : Patient reports that she had a follow up with her physician and she was able to get a shot for her hip. She reports that this has helped a lot and that therapy has also been very beneficial. Reports that she has gotten a lot of benefit from the injection she got from her doctor. She continues to be very active with dog training classes and spending > 6hrs on her feet a day. She reports that she has getting better but is trying to make sure she doesn't injure another body part.                   Recurrent probem    Quality of life: good    Patient Goals  Patient goals for therapy: decreased pain, improved balance, increased motion, increased strength, independence with ADLs/IADLs and return to sport/leisure activities  Patient goal: Training and walking her dog  Pain  Current pain ratin  At best pain ratin  At worst pain ratin  Location: Left lateral hip, greater trochanter  Quality:  discomfort  Relieving factors: rest  Aggravating factors: stair climbing  Progression: no change    Social Support  Steps to enter house: yes  Stairs in house: yes   Lives in: multiple-level home    Employment status: working  Hand dominance: right  Exercise history: Training with her dog, walking 1 mile a day  Life stress: Dog care        Objective     Tenderness     Left Hip   No tenderness in the greater trochanter.     Right Hip   No tenderness in the greater trochanter.     Neurological Testing     Sensation     Hip   Left Hip   Intact: light touch    Right Hip   Intact: light touch    Reflexes   Left   Patellar (L4): normal (2+)  Achilles (S1): normal (2+)    Right   Patellar (L4): normal (2+)  Achilles (S1): normal (2+)    Active Range of Motion   Left Hip   Normal active range of motion    Right Hip   Normal active range of motion    Passive Range of Motion   Left Hip   Flexion: WFL  Abduction: WFL  External rotation (90/90): 45 degrees with pain  Internal rotation (90/90): 35 degrees with pain    Right Hip   Flexion: WFL  Abduction: WFL  External rotation (90/90): 75 degrees   Internal rotation (90/90): 45 degrees     Strength/Myotome Testing     Left Hip   Planes of Motion   Flexion: 4  Extension: 4  Abduction: 4  External rotation: 4  Internal rotation: 4    Right Hip   Planes of Motion   Flexion: 4  Extension: 4  Abduction: 4  External rotation: 4+  Internal rotation: 4+    Left Knee   Flexion: 5  Extension: 5    Right Knee   Flexion: 5  Extension: 5    Tests     Left Hip   Negative SEAMUS, FADIR, piriformis and scour.     Right Hip   Negative SEAMUS, FADIR, piriformis and scour.     Functional Assessment        Single Leg Stance - Eyes Open   Left  Trial 1: 10 seconds  Trial 2: 10 seconds  Trial 3: 10 seconds  Average: 10 seconds     Right  Trial 1: 10 seconds  Trial 2: 10 seconds  Trial 3: 10 seconds  Average: 10 seconds              Precautions: Arthritis, Asthma, Back pain, Osteoporosis, prior  "sx  Access Code: [email or web address]   Manuals  1/23 1/28 1/31  2/3  2/10  2/17 2/27   Lumbar mobs                STM                Hamstring stretch  3x30\"  3x30\"  EH  3x30\"  JM  3x30\" HB   ITB Stretch 3x30\" 3x30  EH 3x30\" JM 3x30\" HB   Piriformis stretch 3x30\" 3x30  EH 3x30\" JM 3x30\" HB   Hip PROM HB HB  EH HB JM HB HB    MRE        2x10    ABD/ADD        Neuro Re-Ed             RE   Tandem stance  held  nv  NV      2x30\"  ea    FOAM dc   Fitter board  x60sec    ML/AP  x60sec    ML/AP  1 min ea  1min ea   x60sec    ML/AP dc   Bridge  3x10    PTB  3x10    PTB  Supine hip abd     Purple   3x10  held    Supine hip abd     Purple   3x10 HEP   SL Clam 2x10     PTB  3x10    PTB  Pink   3x10  BTB   3x10  3x10  3x10  BTB HEP   Lateral step down  hold              Bird dogs                                 Ther Ex                Bike  5' Lv 1  5' Lv 1  5 min   L1  5min   L1  5min   L1  5min L1 5min L1   Leg press 3x10    #95 3x10    #105  105#   3x10 105#    3x12 105#    3x12 115#    3x10 125#    3x8   Marisol held 5x ea    Lateral #20 Lateral  20.0    x5 ea Lateral  20.0    x5 ea  Lateral  20.0    x5 ea Lateral  20.0    x5 ea   Step ups             Fwd 2x10    Lat 2x10     8\"   SL Abd  3x10  3x10  3x10  3x10  3x10  3x10 HEP   Prone press up                Hamstring stretch        3x30\"  3x30\"  3x30\" HEP   ITB Stretch        3x30\"    3x30\" HEP   Piriformis stretch    3x30\" 3x30\"    LTRs 20x ea direction 3x30\"    LTRs 20x ea direction HEP   STS  x10      nv  x10  x10                         Ther Activity                                                  Gait Training                                                  Modalities                                                                         "

## 2025-03-03 NOTE — PROGRESS NOTES
"Daily Note     Today's date: 3/4/2025  Patient name: Jessica Ricci  : 1950  MRN: [id number]  Referring provider: Antonino Hill*  Dx:   Encounter Diagnosis     ICD-10-CM    1. Left hip pain  M25.552       2. Trochanteric bursitis of left hip  M70.62           Start Time: 0725  Stop Time: 0810  Total time in clinic (min): 45 minutes    Subjective: Patient continues to report that she is doing well and her hip pain is under control. She continues to sleep well at night. Has been busy the last few nights as her sister was admitted into the hospital and has been helping take care of her.       Objective: See treatment diary below      Assessment: Tolerated treatment well. Continues to respond well to endurance and strengthening progressions for global LE. Patient demonstrated fatigue post treatment, exhibited good technique with therapeutic exercises, and would benefit from continued PT      Plan: Continue per plan of care.      Precautions: Arthritis, Asthma, Back pain, Osteoporosis, prior sx  Access Code: [email or web address]   Manuals  1/23  1/28  1/31  2/3  2/10  2/17 2/27 3/4   Lumbar mobs                 STM                 Hamstring stretch  3x30\"  3x30\"  EH  3x30\"  JM  3x30\" HB HB   ITB Stretch 3x30\" 3x30  EH 3x30\" JM 3x30\" HB HB   Piriformis stretch 3x30\" 3x30  EH 3x30\" JM 3x30\" HB HB   Hip PROM HB HB  EH HB JM HB HB HB    MRE        2x10    ABD/ADD         Neuro Re-Ed             RE    Tandem stance  held  nv  NV      2x30\"  ea    FOAM dc    Fitter board  x60sec    ML/AP  x60sec    ML/AP  1 min ea  1min ea   x60sec    ML/AP dc    Bridge  3x10    PTB  3x10    PTB  Supine hip abd     Purple   3x10  held    Supine hip abd     Purple   3x10 HEP    SL Clam 2x10     PTB  3x10    PTB  Pink   3x10  BTB   3x10  3x10  3x10  BTB HEP                                       Ther Ex                 Bike  5' Lv 1  5' Lv 1  5 min   L1  5min   L1  5min   L1  5min L1 5min L1 5min L2   Prone extension        3x10  #1   Leg " "press 3x10    #95 3x10    #105  105#   3x10 105#    3x12 105#    3x12 115#    3x10 125#    3x8 125#    3x10   [place] held 5x ea    Lateral #20 Lateral  20.0    x5 ea Lateral  20.0    x5 ea  Lateral  20.0    x5 ea Lateral  20.0    x5 ea Lateral  22.0    x5 ea   Step ups             Fwd 2x10    Lat 2x10     8\" Fwd 2x10    Lat 2x10     8\"   SL Abd  3x10  3x10  3x10  3x10  3x10  3x10 HEP [id number] hold    #1   Wall squat- Pball              3x10   Hamstring stretch        3x30\"  3x30\"  3x30\" HEP    ITB Stretch        3x30\"    3x30\" HEP    Piriformis stretch    3x30\" 3x30\"    LTRs 20x ea direction 3x30\"    LTRs 20x ea direction HEP                      Ther Activity                                                     Gait Training                                                     Modalities                                                            "

## 2025-03-04 ENCOUNTER — OFFICE VISIT (OUTPATIENT)
Dept: PHYSICAL THERAPY | Facility: CLINIC | Age: 75
End: 2025-03-04
Payer: MEDICARE

## 2025-03-04 DIAGNOSIS — M70.62 TROCHANTERIC BURSITIS OF LEFT HIP: ICD-10-CM

## 2025-03-04 DIAGNOSIS — M25.552 LEFT HIP PAIN: Primary | ICD-10-CM

## 2025-03-04 PROCEDURE — 97110 THERAPEUTIC EXERCISES: CPT

## 2025-03-04 PROCEDURE — 97140 MANUAL THERAPY 1/> REGIONS: CPT

## 2025-03-07 ENCOUNTER — APPOINTMENT (OUTPATIENT)
Dept: PHYSICAL THERAPY | Facility: CLINIC | Age: 75
End: 2025-03-07
Payer: MEDICARE

## 2025-03-10 ENCOUNTER — OFFICE VISIT (OUTPATIENT)
Dept: PHYSICAL THERAPY | Facility: CLINIC | Age: 75
End: 2025-03-10
Payer: MEDICARE

## 2025-03-10 DIAGNOSIS — M70.62 TROCHANTERIC BURSITIS OF LEFT HIP: ICD-10-CM

## 2025-03-10 DIAGNOSIS — M25.552 LEFT HIP PAIN: Primary | ICD-10-CM

## 2025-03-10 PROCEDURE — 97110 THERAPEUTIC EXERCISES: CPT

## 2025-03-10 PROCEDURE — 97112 NEUROMUSCULAR REEDUCATION: CPT

## 2025-03-10 NOTE — PROGRESS NOTES
"Daily Note     Today's date: 3/10/2025  Patient name: Jessica Ricci  : 1950  MRN: [id number]  Referring provider: Antonino Hill*  Dx:   Encounter Diagnosis     ICD-10-CM    1. Left hip pain  M25.552       2. Trochanteric bursitis of left hip  M70.62           Start Time: 0800  Stop Time: 0840  Total time in clinic (min): 40 minutes    Subjective: Patient continues to do well on days after therapy. She continues to be able to sleep although she has been very busy helping her family at the hospital.       Objective: See treatment diary below      Assessment: Tolerated treatment well. Continues to respond well to endurance and strengthening progressions for global LE. Patient demonstrated fatigue post treatment, exhibited good technique with therapeutic exercises, and would benefit from continued PT      Plan: Continue per plan of care.      Precautions: Arthritis, Asthma, Back pain, Osteoporosis, prior sx  Access Code: [email or web address]   Manuals  1/31  2/3  2/10  2/17 2/27 3/4 3/10   Lumbar mobs              STM              Hamstring stretch  EH  3x30\"  JM  3x30\" HB HB HB   ITB Stretch  EH 3x30\" JM 3x30\" HB HB HB   Piriformis stretch  EH 3x30\" JM 3x30\" HB HB HB   Hip PROM  EH HB JM HB HB HB HB    MRE    2x10    ABD/ADD          Neuro Re-Ed         RE     Tandem stance  NV      2x30\"  ea    FOAM dc     Fitter board  1 min ea  1min ea   x60sec    ML/AP dc     Bridge  Supine hip abd     Purple   3x10  held    Supine hip abd     Purple   3x10 HEP     SL Clam  Pink   3x10  BTB   3x10  3x10  3x10  BTB HEP                                  Ther Ex              Bike  5 min   L1  5min   L1  5min   L1  5min L1 5min L1 5min L2 5min L2   Prone extension      3x10  #1 3x10  #1   Leg press  105#   3x10 105#    3x12 105#    3x12 115#    3x10 125#    3x8 125#    3x10 125#    3x10   Hip matrix       2x10 ea ABD    #25   [place] Lateral  20.0    x5 ea Lateral  20.0    x5 ea  Lateral  20.0    x5 ea Lateral  20.0    x5 ea " "Lateral  22.0    x5 ea Lateral  22.0    x5 ea   Step ups         Fwd 2x10    Lat 2x10     8\" Fwd 2x10    Lat 2x10     8\" Fwd 2x10    Lat 2x10     8\"   SL Abd  3x10  3x10  3x10  3x10 HEP [id number] hold    #1 [id number] hold    #1   Wall squat- Pball          3x10 3x10   Hamstring stretch    3x30\"  3x30\"  3x30\" HEP     ITB Stretch    3x30\"    3x30\" HEP     Piriformis stretch  3x30\" 3x30\"    LTRs 20x ea direction 3x30\"    LTRs 20x ea direction HEP                    Ther Activity                                            Gait Training                                            Modalities                                                   "

## 2025-03-13 ENCOUNTER — APPOINTMENT (OUTPATIENT)
Dept: PHYSICAL THERAPY | Facility: CLINIC | Age: 75
End: 2025-03-13
Payer: MEDICARE

## 2025-03-20 NOTE — PROGRESS NOTES
"Daily Note     Today's date: 3/21/2025  Patient name: Jessica Ricci  : 1950  MRN: [id number]  Referring provider: Antonino Hill*  Dx:   Encounter Diagnosis     ICD-10-CM    1. Left hip pain  M25.552       2. Trochanteric bursitis of left hip  M70.62           Start Time: 0730  Stop Time: 0810  Total time in clinic (min): 40 minutes      Subjective: Patient continues to do very well with her left hip pain and is able to participate in ADL's and desired activities with her dog.       Objective: See treatment diary below      Assessment: Tolerated treatment well. Continues to respond well to endurance and strengthening progressions for global LE. Progressing well and will be appropriate for d/c next visit. Patient demonstrated fatigue post treatment, exhibited good technique with therapeutic exercises, and would benefit from continued PT      Plan: Continue per plan of care.      Precautions: Arthritis, Asthma, Back pain, Osteoporosis, prior sx  Access Code: [email or web address]   Manuals  1/31  2/3  2/10  2/17 2/27 3/4 3/10 3/21   Lumbar mobs               STM               Hamstring stretch  EH  3x30\"  JM  3x30\" HB HB HB HB   ITB Stretch  EH 3x30\" JM 3x30\" HB HB HB HB   Piriformis stretch  EH 3x30\" JM 3x30\" HB HB HB HB   Hip PROM  EH HB JM HB HB HB HB HB    MRE    2x10    ABD/ADD           Neuro Re-Ed         RE      Tandem stance  NV      2x30\"  ea    FOAM dc      Fitter board  1 min ea  1min ea   x60sec    ML/AP dc      Bridge  Supine hip abd     Purple   3x10  held    Supine hip abd     Purple   3x10 HEP      SL Clam  [place]   3x10  BTB   3x10  3x10  3x10  BTB HEP                                     Ther Ex               Bike  5 min   L1  5min   L1  5min   L1  5min L1 5min L1 5min L2 5min L2 5min L2   Prone extension      3x10  #1 3x10  #1 3x10  #1   Leg press  105#   3x10 105#    3x12 105#    3x12 115#    3x10 125#    3x8 125#    3x10 125#    3x10 125#    3x10   Hip matrix       2x10 ea ABD    #25 2x10 ea " "ABD    #25   [place] Lateral  20.0    x5 ea Lateral  20.0    x5 ea  Lateral  20.0    x5 ea Lateral  20.0    x5 ea Lateral  22.0    x5 ea Lateral  22.0    x5 ea Lateral  22.0    x5 ea   Step ups         Fwd 2x10    Lat 2x10     8\" Fwd 2x10    Lat 2x10     8\" Fwd 2x10    Lat 2x10     8\" Fwd 2x10    Lat 2x10     8\"   SL Abd  3x10  3x10  3x10  3x10 HEP [id number] hold    #1 [id number] hold    #1 [id number] hold    #1   Wall squat- Pball          3x10 3x10 3x10   Hamstring stretch    3x30\"  3x30\"  3x30\" HEP      ITB Stretch    3x30\"    3x30\" HEP      Piriformis stretch  3x30\" 3x30\"    LTRs 20x ea direction 3x30\"    LTRs 20x ea direction HEP                      Ther Activity                                               Gait Training                                               Modalities                                                      "

## 2025-03-21 ENCOUNTER — OFFICE VISIT (OUTPATIENT)
Dept: PHYSICAL THERAPY | Facility: CLINIC | Age: 75
End: 2025-03-21
Payer: MEDICARE

## 2025-03-21 DIAGNOSIS — M70.62 TROCHANTERIC BURSITIS OF LEFT HIP: ICD-10-CM

## 2025-03-21 DIAGNOSIS — M25.552 LEFT HIP PAIN: Primary | ICD-10-CM

## 2025-03-21 PROCEDURE — 97110 THERAPEUTIC EXERCISES: CPT

## 2025-03-21 PROCEDURE — 97112 NEUROMUSCULAR REEDUCATION: CPT

## 2025-03-24 NOTE — PROGRESS NOTES
Daily Note/Discharge Note     Today's date: 3/25/2025  Patient name: Jessica Ricci  : 1950  MRN: [id number]  Referring provider: Antonino Hill*  Dx:   Encounter Diagnosis     ICD-10-CM    1. Left hip pain  M25.552       2. Trochanteric bursitis of left hip  M70.62           Start Time: 725  Stop Time: 805  Total time in clinic (min): 40 minutes    Subjective: Patient continues to report that her hip is doing very well and offers no new complaints. Feels confident transitioning to her independent program and working on HEP.       Objective: See treatment diary below    Goals  Short Term Goals:   1. Patient will be independent with initial customized HEP in 1 week - MET  2. Patient will report at least 40% improvement overall with sleeping at night without waking up in 2-4 weeks. - MET  3. Patient will decrease pain with activity by 2 points on VAS with in 2-4 weeks. - MET  4. Patient will demonstrate improved ability to stand up from a chair 10x's without any deviations and pain in 2-4 weeks - MET  5. Patient will report improved ability to walk her dog daily for 1.5 miles without her hip bothering her in 2-4 weeks - MET    Long Term Goals:   1. Patient will improve FOTO to greater than goal of 73 in 6-8 weeks - MET  2. Patient will eliminiate pain with activity in 6-8 weeks - MET  3. Patient will continue with HEP independence to allow for decreased future reoccurrence of pain and loss in function in 6-8 weeks - MET  4. Patient will report at least 80% improvement overall with her ability to perform SLS for > 10sec in 6-8 weeks. - MET  5. Patient will demonstrate improved MMT scores of her LE BL to > 4+/5 in all directions by discharge. - MET       Assessment: Patient is a 74 y.o. female who has attended 15 physical therapy visits, including this one, for left hip pain. Patient has made significant improvements in pain, mobility, strength, neuromuscular control, proprioception/balance, and  "overall function since beginning physical therapy. Patient has met all short term and long term goals at this time. All patient questions and concerns have been addressed this visit. Patient discharged home with HEP at this time.      Plan: Continue per plan of care.      Precautions: Arthritis, Asthma, Back pain, Osteoporosis, prior sx  Access Code: [email or web address]   Manuals  2/10  2/17 2/27 3/4 3/10 3/21 3/25   Lumbar mobs            STM            Hamstring stretch  JM  3x30\" HB HB HB HB HB   ITB Stretch JM 3x30\" HB HB HB HB HB   Piriformis stretch JM 3x30\" HB HB HB HB HB   Hip PROM JM HB HB HB HB HB HB    MRE            Neuro Re-Ed     RE       Tandem stance    2x30\"  ea    FOAM dc       Fitter board   x60sec    ML/AP dc       Bridge    Supine hip abd     Purple   3x10 HEP       SL Clam  3x10  3x10  BTB HEP                                Ther Ex            Bike  5min   L1  5min L1 5min L1 5min L2 5min L2 5min L2 5min L2   Prone extension    3x10  #1 3x10  #1 3x10  #1 3x10  #1   Leg press 105#    3x12 115#    3x10 125#    3x8 125#    3x10 125#    3x10 125#    3x10 125#    3x10   Hip matrix     2x10 ea ABD    #25 2x10 ea ABD    #25 2x10 ea ABD    #25   [place]  Lateral  20.0    x5 ea Lateral  20.0    x5 ea Lateral  22.0    x5 ea Lateral  22.0    x5 ea Lateral  22.0    x5 ea Lateral  22.0    x5 ea   Step ups     Fwd 2x10    Lat 2x10     8\" Fwd 2x10    Lat 2x10     8\" Fwd 2x10    Lat 2x10     8\" Fwd 2x10    Lat 2x10     8\" Fwd 2x10    Lat 2x10     8\"   SL Abd  3x10  3x10 HEP [id number] hold    #1 [id number] hold    #1 [id number] hold    #1 [id number] hold       Wall squat- Pball      3x10 3x10 3x10 3x10   Hamstring stretch  3x30\"  3x30\" HEP       ITB Stretch    3x30\" HEP       Piriformis stretch 3x30\"    LTRs 20x ea direction 3x30\"    LTRs 20x ea direction HEP                    Ther Activity                                      Gait Training                                      Modalities                                      "

## 2025-03-25 ENCOUNTER — OFFICE VISIT (OUTPATIENT)
Dept: PHYSICAL THERAPY | Facility: CLINIC | Age: 75
End: 2025-03-25
Payer: MEDICARE

## 2025-03-25 DIAGNOSIS — M70.62 TROCHANTERIC BURSITIS OF LEFT HIP: ICD-10-CM

## 2025-03-25 DIAGNOSIS — M25.552 LEFT HIP PAIN: Primary | ICD-10-CM

## 2025-03-25 PROCEDURE — 97110 THERAPEUTIC EXERCISES: CPT

## 2025-03-25 PROCEDURE — 97112 NEUROMUSCULAR REEDUCATION: CPT

## 2025-07-01 ENCOUNTER — TELEPHONE (OUTPATIENT)
Dept: FAMILY MEDICINE CLINIC | Facility: CLINIC | Age: 75
End: 2025-07-01

## 2025-07-01 NOTE — TELEPHONE ENCOUNTER
[facility] Please update PCP field.

## 2025-07-03 ENCOUNTER — DOCUMENTATION (OUTPATIENT)
Dept: ADMINISTRATIVE | Facility: OTHER | Age: 75
End: 2025-07-03

## 2025-07-03 NOTE — TELEPHONE ENCOUNTER
07/03/25 1:00 PM     The office's request has been received and reviewed.     PCP cannot be removed at this time due to insurance roster information. Roster will be re-checked at a later date. If patient is no longer listed at that time, PCP will be removed in the chart.      This message will now be completed.    Any additional questions or concerns should be sent to the Practice Liaisons via the appropriate education email address. Please do not reply via In Basket or Encounter.    Thank you  Sugey Birch MA